# Patient Record
Sex: MALE | Race: WHITE
[De-identification: names, ages, dates, MRNs, and addresses within clinical notes are randomized per-mention and may not be internally consistent; named-entity substitution may affect disease eponyms.]

---

## 2018-01-17 ENCOUNTER — HOSPITAL ENCOUNTER (INPATIENT)
Dept: HOSPITAL 94 - ER | Age: 68
LOS: 5 days | Discharge: HOME | DRG: 189 | End: 2018-01-22
Attending: INTERNAL MEDICINE | Admitting: SPECIALIST
Payer: OTHER GOVERNMENT

## 2018-01-17 VITALS — BODY MASS INDEX: 30.56 KG/M2 | WEIGHT: 238.1 LBS | HEIGHT: 74 IN

## 2018-01-17 VITALS — DIASTOLIC BLOOD PRESSURE: 89 MMHG | SYSTOLIC BLOOD PRESSURE: 133 MMHG

## 2018-01-17 DIAGNOSIS — M19.90: ICD-10-CM

## 2018-01-17 DIAGNOSIS — I08.1: ICD-10-CM

## 2018-01-17 DIAGNOSIS — R91.8: ICD-10-CM

## 2018-01-17 DIAGNOSIS — I42.0: ICD-10-CM

## 2018-01-17 DIAGNOSIS — K21.9: ICD-10-CM

## 2018-01-17 DIAGNOSIS — Z99.81: ICD-10-CM

## 2018-01-17 DIAGNOSIS — Z92.21: ICD-10-CM

## 2018-01-17 DIAGNOSIS — F31.9: ICD-10-CM

## 2018-01-17 DIAGNOSIS — I50.9: ICD-10-CM

## 2018-01-17 DIAGNOSIS — E66.9: ICD-10-CM

## 2018-01-17 DIAGNOSIS — Z79.899: ICD-10-CM

## 2018-01-17 DIAGNOSIS — Z90.49: ICD-10-CM

## 2018-01-17 DIAGNOSIS — I25.10: ICD-10-CM

## 2018-01-17 DIAGNOSIS — I27.20: ICD-10-CM

## 2018-01-17 DIAGNOSIS — I49.3: ICD-10-CM

## 2018-01-17 DIAGNOSIS — C91.10: ICD-10-CM

## 2018-01-17 DIAGNOSIS — F17.210: ICD-10-CM

## 2018-01-17 DIAGNOSIS — Z88.8: ICD-10-CM

## 2018-01-17 DIAGNOSIS — J84.9: ICD-10-CM

## 2018-01-17 DIAGNOSIS — J84.10: ICD-10-CM

## 2018-01-17 DIAGNOSIS — Z77.090: ICD-10-CM

## 2018-01-17 DIAGNOSIS — I47.1: ICD-10-CM

## 2018-01-17 DIAGNOSIS — J44.1: ICD-10-CM

## 2018-01-17 DIAGNOSIS — I11.0: ICD-10-CM

## 2018-01-17 DIAGNOSIS — B33.24: ICD-10-CM

## 2018-01-17 DIAGNOSIS — Z77.098: ICD-10-CM

## 2018-01-17 DIAGNOSIS — J96.21: Primary | ICD-10-CM

## 2018-01-17 LAB
ALBUMIN SERPL BCP-MCNC: 3.7 G/DL (ref 3.4–5)
ALBUMIN/GLOB SERPL: 1.1 {RATIO} (ref 1.1–1.5)
ALP SERPL-CCNC: 83 IU/L (ref 46–116)
ALT SERPL W P-5'-P-CCNC: 31 U/L (ref 12–78)
ANION GAP SERPL CALCULATED.3IONS-SCNC: 4 MMOL/L (ref 8–16)
ANISOCYTOSIS BLD QL SMEAR: (no result)
APTT PPP: 25 SECONDS (ref 22–32)
AST SERPL W P-5'-P-CCNC: 33 U/L (ref 10–37)
BASE EXCESS BLDA CALC-SCNC: 2.4 MMOL/L (ref -2–3)
BASOPHILS # BLD AUTO: 1.4 X10'3 (ref 0–0.2)
BASOPHILS NFR BLD AUTO: 2.5 % (ref 0–1)
BILIRUB SERPL-MCNC: 0.6 MG/DL (ref 0.1–1)
BLASTS NFR BLD MANUAL: 2 % (ref 0–0)
BODY TEMPERATURE: 37
BUN SERPL-MCNC: 15 MG/DL (ref 7–18)
BUN/CREAT SERPL: 11.5 (ref 5.4–32)
CALCIUM SERPL-MCNC: 8.6 MG/DL (ref 8.5–10.1)
CHLORIDE SERPL-SCNC: 101 MMOL/L (ref 99–107)
CO2 SERPL-SCNC: 31.9 MMOL/L (ref 24–32)
COHGB MFR BLDA: 3.9 % (ref 0.5–1.5)
CREAT SERPL-MCNC: 1.3 MG/DL (ref 0.6–1.1)
EOSINOPHIL # BLD AUTO: 0.2 X10'3 (ref 0–0.9)
EOSINOPHIL NFR BLD AUTO: 0.4 % (ref 0–6)
EOSINOPHIL NFR BLD MANUAL: 1 % (ref 0–6)
ERYTHROCYTE [DISTWIDTH] IN BLOOD BY AUTOMATED COUNT: 16.7 % (ref 11.5–14.5)
GFR SERPL CREATININE-BSD FRML MDRD: 55 ML/MIN
GLUCOSE SERPL-MCNC: 109 MG/DL (ref 70–104)
HCO3 BLDA-SCNC: 25.8 MMOL/L (ref 22–26)
HCT VFR BLD AUTO: 39.1 % (ref 42–52)
HGB BLD-MCNC: 13.1 G/DL (ref 14–17.9)
HGB BLDA-MCNC: 13.4 G/DL (ref 14–18)
INR PPP: 1 INR
LYMPHOCYTES # BLD AUTO: 48.1 X10'3 (ref 1.1–4.8)
LYMPHOCYTES NFR BLD AUTO: 85.1 % (ref 21–51)
LYMPHOCYTES NFR BLD MANUAL: 88 % (ref 21–51)
MCH RBC QN AUTO: 34.1 PG (ref 27–31)
MCHC RBC AUTO-ENTMCNC: 33.4 % (ref 33–36.5)
MCV RBC AUTO: 102.1 FL (ref 78–98)
METHGB MFR BLDA: 0.1 % (ref 0.3–1.12)
MONOCYTES # BLD AUTO: 0.4 X10'3 (ref 0–0.9)
MONOCYTES NFR BLD AUTO: 0.7 % (ref 2–12)
NEUTROPHILS # BLD AUTO: 6.4 X10'3 (ref 1.8–7.7)
NEUTROPHILS NFR BLD AUTO: 11.3 % (ref 42–75)
NEUTS SEG NFR BLD MANUAL: 9 % (ref 42–75)
O2/TOTAL GAS SETTING VFR VENT: 21 MMHG/%
OXYHGB MFR BLDA: 86.5 % (ref 94–100)
PCO2 TEMP ADJ BLDA: 36 MMHG (ref 35–48)
PH TEMP ADJ BLDA: 7.47 [PH] (ref 7.35–7.45)
PLATELET # BLD AUTO: 123 X10'3 (ref 140–440)
PLATELET BLD QL SMEAR: (no result)
PMV BLD AUTO: 6.9 FL (ref 7.4–10.4)
PO2 TEMP ADJ BLD: 52.1 MMHG (ref 83–108)
POTASSIUM SERPL-SCNC: 3.9 MMOL/L (ref 3.5–5.1)
PROT SERPL-MCNC: 7.2 G/DL (ref 6.4–8.2)
PROTHROMBIN TIME: 10.1 SECONDS (ref 9–12)
RBC # BLD AUTO: 3.83 X10'6 (ref 4.7–6.1)
RBC MORPH BLD: (no result)
SAO2 % BLDA: 90.1 % (ref 95–98)
SMUDGE CELLS BLD QL SMEAR: (no result)
SODIUM SERPL-SCNC: 137 MMOL/L (ref 135–145)
TOTAL CELLS COUNTED FLD: 100
WBC # BLD AUTO: 56.5 X10'3 (ref 4.5–11)

## 2018-01-17 PROCEDURE — 83735 ASSAY OF MAGNESIUM: CPT

## 2018-01-17 PROCEDURE — 94640 AIRWAY INHALATION TREATMENT: CPT

## 2018-01-17 PROCEDURE — 85018 HEMOGLOBIN: CPT

## 2018-01-17 PROCEDURE — 93005 ELECTROCARDIOGRAM TRACING: CPT

## 2018-01-17 PROCEDURE — 83880 ASSAY OF NATRIURETIC PEPTIDE: CPT

## 2018-01-17 PROCEDURE — 83605 ASSAY OF LACTIC ACID: CPT

## 2018-01-17 PROCEDURE — 96365 THER/PROPH/DIAG IV INF INIT: CPT

## 2018-01-17 PROCEDURE — 36600 WITHDRAWAL OF ARTERIAL BLOOD: CPT

## 2018-01-17 PROCEDURE — 36415 COLL VENOUS BLD VENIPUNCTURE: CPT

## 2018-01-17 PROCEDURE — 87503 INFLUENZA DNA AMP PROB ADDL: CPT

## 2018-01-17 PROCEDURE — 84145 PROCALCITONIN (PCT): CPT

## 2018-01-17 PROCEDURE — 71275 CT ANGIOGRAPHY CHEST: CPT

## 2018-01-17 PROCEDURE — 85730 THROMBOPLASTIN TIME PARTIAL: CPT

## 2018-01-17 PROCEDURE — 96375 TX/PRO/DX INJ NEW DRUG ADDON: CPT

## 2018-01-17 PROCEDURE — 87070 CULTURE OTHR SPECIMN AEROBIC: CPT

## 2018-01-17 PROCEDURE — 87040 BLOOD CULTURE FOR BACTERIA: CPT

## 2018-01-17 PROCEDURE — 71045 X-RAY EXAM CHEST 1 VIEW: CPT

## 2018-01-17 PROCEDURE — 85610 PROTHROMBIN TIME: CPT

## 2018-01-17 PROCEDURE — 85025 COMPLETE CBC W/AUTO DIFF WBC: CPT

## 2018-01-17 PROCEDURE — 82803 BLOOD GASES ANY COMBINATION: CPT

## 2018-01-17 PROCEDURE — 99285 EMERGENCY DEPT VISIT HI MDM: CPT

## 2018-01-17 PROCEDURE — 94760 N-INVAS EAR/PLS OXIMETRY 1: CPT

## 2018-01-17 PROCEDURE — 80053 COMPREHEN METABOLIC PANEL: CPT

## 2018-01-17 PROCEDURE — 87502 INFLUENZA DNA AMP PROBE: CPT

## 2018-01-17 PROCEDURE — 84484 ASSAY OF TROPONIN QUANT: CPT

## 2018-01-17 RX ADMIN — METHYLPREDNISOLONE SODIUM SUCCINATE SCH MG: 40 INJECTION, POWDER, FOR SOLUTION INTRAMUSCULAR; INTRAVENOUS at 23:35

## 2018-01-18 VITALS — DIASTOLIC BLOOD PRESSURE: 91 MMHG | SYSTOLIC BLOOD PRESSURE: 120 MMHG

## 2018-01-18 VITALS — SYSTOLIC BLOOD PRESSURE: 104 MMHG | DIASTOLIC BLOOD PRESSURE: 67 MMHG

## 2018-01-18 VITALS — DIASTOLIC BLOOD PRESSURE: 76 MMHG | SYSTOLIC BLOOD PRESSURE: 125 MMHG

## 2018-01-18 VITALS — DIASTOLIC BLOOD PRESSURE: 71 MMHG | SYSTOLIC BLOOD PRESSURE: 114 MMHG

## 2018-01-18 VITALS — SYSTOLIC BLOOD PRESSURE: 116 MMHG | DIASTOLIC BLOOD PRESSURE: 74 MMHG

## 2018-01-18 VITALS — DIASTOLIC BLOOD PRESSURE: 79 MMHG | SYSTOLIC BLOOD PRESSURE: 114 MMHG

## 2018-01-18 LAB
ALBUMIN SERPL BCP-MCNC: 3.4 G/DL (ref 3.4–5)
ALBUMIN/GLOB SERPL: 1 {RATIO} (ref 1.1–1.5)
ALP SERPL-CCNC: 81 IU/L (ref 46–116)
ALT SERPL W P-5'-P-CCNC: 19 U/L (ref 12–78)
ANION GAP SERPL CALCULATED.3IONS-SCNC: 8 MMOL/L (ref 8–16)
ANISOCYTOSIS BLD QL SMEAR: (no result)
AST SERPL W P-5'-P-CCNC: 16 U/L (ref 10–37)
BASOPHILS # BLD AUTO: (no result) X10'3 (ref 0–0.2)
BASOPHILS NFR BLD AUTO: (no result) % (ref 0–1)
BILIRUB SERPL-MCNC: 0.5 MG/DL (ref 0.1–1)
BUN SERPL-MCNC: 13 MG/DL (ref 7–18)
BUN/CREAT SERPL: 13 (ref 5.4–32)
CALCIUM SERPL-MCNC: 8.4 MG/DL (ref 8.5–10.1)
CHLORIDE SERPL-SCNC: 100 MMOL/L (ref 99–107)
CO2 SERPL-SCNC: 27.9 MMOL/L (ref 24–32)
CREAT SERPL-MCNC: 1 MG/DL (ref 0.6–1.1)
EOSINOPHIL # BLD AUTO: (no result) X10'3 (ref 0–0.9)
EOSINOPHIL NFR BLD AUTO: (no result) % (ref 0–6)
ERYTHROCYTE [DISTWIDTH] IN BLOOD BY AUTOMATED COUNT: 16.7 % (ref 11.5–14.5)
GFR SERPL CREATININE-BSD FRML MDRD: 75 ML/MIN
GLUCOSE SERPL-MCNC: 145 MG/DL (ref 70–104)
HCT VFR BLD AUTO: 36.7 % (ref 42–52)
HGB BLD-MCNC: 12.2 G/DL (ref 14–17.9)
LYMPHOCYTES # BLD AUTO: (no result) X10'3 (ref 1.1–4.8)
LYMPHOCYTES NFR BLD AUTO: (no result) % (ref 21–51)
LYMPHOCYTES NFR BLD MANUAL: 90 % (ref 21–51)
MCH RBC QN AUTO: 34 PG (ref 27–31)
MCHC RBC AUTO-ENTMCNC: 33.3 % (ref 33–36.5)
MCV RBC AUTO: 102.2 FL (ref 78–98)
MONOCYTES # BLD AUTO: (no result) X10'3 (ref 0–0.9)
MONOCYTES NFR BLD AUTO: (no result) % (ref 2–12)
MONOCYTES NFR BLD MANUAL: 1 % (ref 2–12)
NEUTROPHILS # BLD AUTO: (no result) X10'3 (ref 1.8–7.7)
NEUTROPHILS NFR BLD AUTO: (no result) % (ref 42–75)
NEUTS SEG NFR BLD MANUAL: 9 % (ref 42–75)
PLATELET # BLD AUTO: 129 X10'3 (ref 140–440)
PLATELET BLD QL SMEAR: (no result)
PMV BLD AUTO: 7.2 FL (ref 7.4–10.4)
POTASSIUM SERPL-SCNC: 3.7 MMOL/L (ref 3.5–5.1)
PROT SERPL-MCNC: 6.7 G/DL (ref 6.4–8.2)
RBC # BLD AUTO: 3.6 X10'6 (ref 4.7–6.1)
RBC MORPH BLD: (no result)
SMUDGE CELLS BLD QL SMEAR: (no result)
SODIUM SERPL-SCNC: 136 MMOL/L (ref 135–145)
TOTAL CELLS COUNTED FLD: 100
WBC # BLD AUTO: 70.7 X10'3 (ref 4.5–11)

## 2018-01-18 RX ADMIN — Medication SCH EACH: at 08:12

## 2018-01-18 RX ADMIN — IPRATROPIUM BROMIDE AND ALBUTEROL SULFATE PRN ML: .5; 3 SOLUTION RESPIRATORY (INHALATION) at 22:22

## 2018-01-18 RX ADMIN — METHYLPREDNISOLONE SODIUM SUCCINATE SCH MG: 40 INJECTION, POWDER, FOR SOLUTION INTRAMUSCULAR; INTRAVENOUS at 08:00

## 2018-01-18 RX ADMIN — ENOXAPARIN SODIUM SCH MG: 100 INJECTION SUBCUTANEOUS at 08:14

## 2018-01-18 RX ADMIN — IPRATROPIUM BROMIDE AND ALBUTEROL SULFATE PRN ML: .5; 3 SOLUTION RESPIRATORY (INHALATION) at 07:43

## 2018-01-18 RX ADMIN — Medication SCH MG: at 08:13

## 2018-01-18 RX ADMIN — IPRATROPIUM BROMIDE AND ALBUTEROL SULFATE PRN ML: .5; 3 SOLUTION RESPIRATORY (INHALATION) at 16:56

## 2018-01-18 RX ADMIN — HYDROCODONE BITARTRATE AND ACETAMINOPHEN PRN TAB: 5; 325 TABLET ORAL at 17:44

## 2018-01-18 RX ADMIN — HYDROCODONE BITARTRATE AND ACETAMINOPHEN PRN TAB: 5; 325 TABLET ORAL at 09:52

## 2018-01-19 VITALS — SYSTOLIC BLOOD PRESSURE: 113 MMHG | DIASTOLIC BLOOD PRESSURE: 68 MMHG

## 2018-01-19 VITALS — SYSTOLIC BLOOD PRESSURE: 106 MMHG | DIASTOLIC BLOOD PRESSURE: 71 MMHG

## 2018-01-19 VITALS — DIASTOLIC BLOOD PRESSURE: 70 MMHG | SYSTOLIC BLOOD PRESSURE: 106 MMHG

## 2018-01-19 VITALS — SYSTOLIC BLOOD PRESSURE: 108 MMHG | DIASTOLIC BLOOD PRESSURE: 69 MMHG

## 2018-01-19 VITALS — SYSTOLIC BLOOD PRESSURE: 97 MMHG | DIASTOLIC BLOOD PRESSURE: 69 MMHG

## 2018-01-19 VITALS — SYSTOLIC BLOOD PRESSURE: 108 MMHG | DIASTOLIC BLOOD PRESSURE: 67 MMHG

## 2018-01-19 VITALS — SYSTOLIC BLOOD PRESSURE: 111 MMHG | DIASTOLIC BLOOD PRESSURE: 75 MMHG

## 2018-01-19 LAB
ALBUMIN SERPL BCP-MCNC: 3.4 G/DL (ref 3.4–5)
ALBUMIN/GLOB SERPL: 1 {RATIO} (ref 1.1–1.5)
ALP SERPL-CCNC: 73 IU/L (ref 46–116)
ALT SERPL W P-5'-P-CCNC: 26 U/L (ref 12–78)
ANION GAP SERPL CALCULATED.3IONS-SCNC: 5 MMOL/L (ref 8–16)
ANISOCYTOSIS BLD QL SMEAR: (no result)
AST SERPL W P-5'-P-CCNC: 26 U/L (ref 10–37)
BASOPHILS # BLD AUTO: (no result) X10'3 (ref 0–0.2)
BASOPHILS NFR BLD AUTO: (no result) % (ref 0–1)
BILIRUB SERPL-MCNC: 0.4 MG/DL (ref 0.1–1)
BLASTS NFR BLD MANUAL: 35 % (ref 0–0)
BUN SERPL-MCNC: 22 MG/DL (ref 7–18)
BUN/CREAT SERPL: 16.9 (ref 5.4–32)
CALCIUM SERPL-MCNC: 8.2 MG/DL (ref 8.5–10.1)
CHLORIDE SERPL-SCNC: 100 MMOL/L (ref 99–107)
CO2 SERPL-SCNC: 31.1 MMOL/L (ref 24–32)
CREAT SERPL-MCNC: 1.3 MG/DL (ref 0.6–1.1)
EOSINOPHIL # BLD AUTO: (no result) X10'3 (ref 0–0.9)
EOSINOPHIL NFR BLD AUTO: (no result) % (ref 0–6)
ERYTHROCYTE [DISTWIDTH] IN BLOOD BY AUTOMATED COUNT: 15.5 % (ref 11.5–14.5)
GFR SERPL CREATININE-BSD FRML MDRD: 55 ML/MIN
GLUCOSE SERPL-MCNC: 155 MG/DL (ref 70–104)
HCT VFR BLD AUTO: 34.9 % (ref 42–52)
HGB BLD-MCNC: 12.2 G/DL (ref 14–17.9)
LYMPHOCYTES # BLD AUTO: (no result) X10'3 (ref 1.1–4.8)
LYMPHOCYTES NFR BLD AUTO: (no result) % (ref 21–51)
LYMPHOCYTES NFR BLD MANUAL: 60 % (ref 21–51)
MCH RBC QN AUTO: 35.5 PG (ref 27–31)
MCHC RBC AUTO-ENTMCNC: 34.8 % (ref 33–36.5)
MCV RBC AUTO: 101.9 FL (ref 78–98)
MONOCYTES # BLD AUTO: (no result) X10'3 (ref 0–0.9)
MONOCYTES NFR BLD AUTO: (no result) % (ref 2–12)
MONOCYTES NFR BLD MANUAL: 1 % (ref 2–12)
NEUTROPHILS # BLD AUTO: (no result) X10'3 (ref 1.8–7.7)
NEUTROPHILS NFR BLD AUTO: (no result) % (ref 42–75)
NEUTS SEG NFR BLD MANUAL: 4 % (ref 42–75)
PLATELET # BLD AUTO: 124 X10'3 (ref 140–440)
PLATELET BLD QL SMEAR: (no result)
PMV BLD AUTO: 8 FL (ref 7.4–10.4)
POTASSIUM SERPL-SCNC: 4.5 MMOL/L (ref 3.5–5.1)
PROT SERPL-MCNC: 6.7 G/DL (ref 6.4–8.2)
RBC # BLD AUTO: 3.43 X10'6 (ref 4.7–6.1)
RBC MORPH BLD: (no result)
SMUDGE CELLS BLD QL SMEAR: (no result)
SODIUM SERPL-SCNC: 136 MMOL/L (ref 135–145)
TOTAL CELLS COUNTED FLD: 100
WBC # BLD AUTO: 74.2 X10'3 (ref 4.5–11)

## 2018-01-19 RX ADMIN — IPRATROPIUM BROMIDE AND ALBUTEROL SULFATE PRN ML: .5; 3 SOLUTION RESPIRATORY (INHALATION) at 21:06

## 2018-01-19 RX ADMIN — GUAIFENESIN SCH MG: 200 SOLUTION ORAL at 12:07

## 2018-01-19 RX ADMIN — Medication SCH EACH: at 08:32

## 2018-01-19 RX ADMIN — Medication SCH MG: at 08:31

## 2018-01-19 RX ADMIN — HYDROCODONE BITARTRATE AND ACETAMINOPHEN PRN TAB: 5; 325 TABLET ORAL at 08:31

## 2018-01-19 RX ADMIN — IPRATROPIUM BROMIDE AND ALBUTEROL SULFATE PRN ML: .5; 3 SOLUTION RESPIRATORY (INHALATION) at 02:48

## 2018-01-19 RX ADMIN — ENOXAPARIN SODIUM SCH MG: 100 INJECTION SUBCUTANEOUS at 08:40

## 2018-01-19 RX ADMIN — IPRATROPIUM BROMIDE AND ALBUTEROL SULFATE PRN ML: .5; 3 SOLUTION RESPIRATORY (INHALATION) at 07:26

## 2018-01-19 RX ADMIN — HYDROCODONE BITARTRATE AND ACETAMINOPHEN PRN TAB: 5; 325 TABLET ORAL at 18:43

## 2018-01-19 RX ADMIN — GUAIFENESIN SCH MG: 200 SOLUTION ORAL at 20:34

## 2018-01-20 VITALS — DIASTOLIC BLOOD PRESSURE: 60 MMHG | SYSTOLIC BLOOD PRESSURE: 91 MMHG

## 2018-01-20 VITALS — SYSTOLIC BLOOD PRESSURE: 99 MMHG | DIASTOLIC BLOOD PRESSURE: 80 MMHG

## 2018-01-20 VITALS — SYSTOLIC BLOOD PRESSURE: 100 MMHG | DIASTOLIC BLOOD PRESSURE: 59 MMHG

## 2018-01-20 VITALS — SYSTOLIC BLOOD PRESSURE: 102 MMHG | DIASTOLIC BLOOD PRESSURE: 67 MMHG

## 2018-01-20 LAB
ALBUMIN SERPL BCP-MCNC: 3.4 G/DL (ref 3.4–5)
ALBUMIN/GLOB SERPL: 1.1 {RATIO} (ref 1.1–1.5)
ALP SERPL-CCNC: 66 IU/L (ref 46–116)
ALT SERPL W P-5'-P-CCNC: 23 U/L (ref 12–78)
ANION GAP SERPL CALCULATED.3IONS-SCNC: 7 MMOL/L (ref 8–16)
ANISOCYTOSIS BLD QL SMEAR: (no result)
AST SERPL W P-5'-P-CCNC: 15 U/L (ref 10–37)
BASOPHILS # BLD AUTO: (no result) X10'3 (ref 0–0.2)
BASOPHILS NFR BLD AUTO: (no result) % (ref 0–1)
BILIRUB SERPL-MCNC: 0.4 MG/DL (ref 0.1–1)
BUN SERPL-MCNC: 26 MG/DL (ref 7–18)
BUN/CREAT SERPL: 20 (ref 5.4–32)
CALCIUM SERPL-MCNC: 8.4 MG/DL (ref 8.5–10.1)
CHLORIDE SERPL-SCNC: 99 MMOL/L (ref 99–107)
CO2 SERPL-SCNC: 31.7 MMOL/L (ref 24–32)
CREAT SERPL-MCNC: 1.3 MG/DL (ref 0.6–1.1)
EOSINOPHIL # BLD AUTO: (no result) X10'3 (ref 0–0.9)
EOSINOPHIL NFR BLD AUTO: (no result) % (ref 0–6)
ERYTHROCYTE [DISTWIDTH] IN BLOOD BY AUTOMATED COUNT: 17 % (ref 11.5–14.5)
GFR SERPL CREATININE-BSD FRML MDRD: 55 ML/MIN
GLUCOSE SERPL-MCNC: 109 MG/DL (ref 70–104)
HCT VFR BLD AUTO: 36.3 % (ref 42–52)
HGB BLD-MCNC: 11.7 G/DL (ref 14–17.9)
LYMPHOCYTES # BLD AUTO: (no result) X10'3 (ref 1.1–4.8)
LYMPHOCYTES NFR BLD AUTO: (no result) % (ref 21–51)
LYMPHOCYTES NFR BLD MANUAL: 88 % (ref 21–51)
MACROCYTES BLD QL SMEAR: (no result)
MAGNESIUM SERPL-MCNC: 1.4 MG/DL (ref 1.5–2.4)
MCH RBC QN AUTO: 33.7 PG (ref 27–31)
MCHC RBC AUTO-ENTMCNC: 32.3 % (ref 33–36.5)
MCV RBC AUTO: 104.5 FL (ref 78–98)
MONOCYTES # BLD AUTO: (no result) X10'3 (ref 0–0.9)
MONOCYTES NFR BLD AUTO: (no result) % (ref 2–12)
MONOCYTES NFR BLD MANUAL: 1 % (ref 2–12)
NEUTROPHILS # BLD AUTO: (no result) X10'3 (ref 1.8–7.7)
NEUTROPHILS NFR BLD AUTO: (no result) % (ref 42–75)
NEUTS SEG NFR BLD MANUAL: 11 % (ref 42–75)
PLATELET # BLD AUTO: 130 X10'3 (ref 140–440)
PLATELET BLD QL SMEAR: (no result)
PMV BLD AUTO: 7.3 FL (ref 7.4–10.4)
POTASSIUM SERPL-SCNC: 3.7 MMOL/L (ref 3.5–5.1)
PROT SERPL-MCNC: 6.5 G/DL (ref 6.4–8.2)
RBC # BLD AUTO: 3.48 X10'6 (ref 4.7–6.1)
RBC MORPH BLD: (no result)
SMUDGE CELLS BLD QL SMEAR: (no result)
SODIUM SERPL-SCNC: 138 MMOL/L (ref 135–145)
TOTAL CELLS COUNTED FLD: 100
WBC # BLD AUTO: 72.5 X10'3 (ref 4.5–11)

## 2018-01-20 RX ADMIN — IPRATROPIUM BROMIDE AND ALBUTEROL SULFATE PRN ML: .5; 3 SOLUTION RESPIRATORY (INHALATION) at 07:31

## 2018-01-20 RX ADMIN — GUAIFENESIN SCH MG: 200 SOLUTION ORAL at 13:45

## 2018-01-20 RX ADMIN — HYDROCODONE BITARTRATE AND ACETAMINOPHEN PRN TAB: 5; 325 TABLET ORAL at 21:10

## 2018-01-20 RX ADMIN — GUAIFENESIN SCH MG: 200 SOLUTION ORAL at 08:31

## 2018-01-20 RX ADMIN — HYDROCODONE BITARTRATE AND ACETAMINOPHEN PRN TAB: 5; 325 TABLET ORAL at 15:01

## 2018-01-20 RX ADMIN — MAGNESIUM HYDROXIDE PRN ML: 400 SUSPENSION ORAL at 11:45

## 2018-01-20 RX ADMIN — ENOXAPARIN SODIUM SCH MG: 100 INJECTION SUBCUTANEOUS at 08:33

## 2018-01-20 RX ADMIN — HYDROCODONE BITARTRATE AND ACETAMINOPHEN PRN TAB: 5; 325 TABLET ORAL at 08:33

## 2018-01-20 RX ADMIN — GUAIFENESIN SCH MG: 200 SOLUTION ORAL at 21:03

## 2018-01-20 RX ADMIN — Medication SCH EACH: at 08:32

## 2018-01-20 RX ADMIN — IPRATROPIUM BROMIDE AND ALBUTEROL SULFATE PRN ML: .5; 3 SOLUTION RESPIRATORY (INHALATION) at 17:41

## 2018-01-20 RX ADMIN — Medication SCH MG: at 08:32

## 2018-01-21 VITALS — DIASTOLIC BLOOD PRESSURE: 59 MMHG | SYSTOLIC BLOOD PRESSURE: 101 MMHG

## 2018-01-21 VITALS — SYSTOLIC BLOOD PRESSURE: 106 MMHG | DIASTOLIC BLOOD PRESSURE: 68 MMHG

## 2018-01-21 VITALS — SYSTOLIC BLOOD PRESSURE: 112 MMHG | DIASTOLIC BLOOD PRESSURE: 69 MMHG

## 2018-01-21 VITALS — DIASTOLIC BLOOD PRESSURE: 85 MMHG | SYSTOLIC BLOOD PRESSURE: 128 MMHG

## 2018-01-21 VITALS — DIASTOLIC BLOOD PRESSURE: 67 MMHG | SYSTOLIC BLOOD PRESSURE: 108 MMHG

## 2018-01-21 LAB
ALBUMIN SERPL BCP-MCNC: 3.4 G/DL (ref 3.4–5)
ALBUMIN/GLOB SERPL: 1.2 {RATIO} (ref 1.1–1.5)
ALP SERPL-CCNC: 67 IU/L (ref 46–116)
ALT SERPL W P-5'-P-CCNC: 37 U/L (ref 12–78)
ANION GAP SERPL CALCULATED.3IONS-SCNC: 5 MMOL/L (ref 8–16)
ANISOCYTOSIS BLD QL SMEAR: (no result)
AST SERPL W P-5'-P-CCNC: 27 U/L (ref 10–37)
BASOPHILS # BLD AUTO: (no result) X10'3 (ref 0–0.2)
BASOPHILS NFR BLD AUTO: (no result) % (ref 0–1)
BILIRUB SERPL-MCNC: 0.3 MG/DL (ref 0.1–1)
BUN SERPL-MCNC: 20 MG/DL (ref 7–18)
BUN/CREAT SERPL: 18.2 (ref 5.4–32)
CALCIUM SERPL-MCNC: 8.3 MG/DL (ref 8.5–10.1)
CHLORIDE SERPL-SCNC: 101 MMOL/L (ref 99–107)
CO2 SERPL-SCNC: 31.1 MMOL/L (ref 24–32)
CREAT SERPL-MCNC: 1.1 MG/DL (ref 0.6–1.1)
EOSINOPHIL # BLD AUTO: (no result) X10'3 (ref 0–0.9)
EOSINOPHIL NFR BLD AUTO: (no result) % (ref 0–6)
ERYTHROCYTE [DISTWIDTH] IN BLOOD BY AUTOMATED COUNT: 16.8 % (ref 11.5–14.5)
GFR SERPL CREATININE-BSD FRML MDRD: 67 ML/MIN
GLUCOSE SERPL-MCNC: 91 MG/DL (ref 70–104)
HCT VFR BLD AUTO: 37 % (ref 42–52)
HGB BLD-MCNC: 11.9 G/DL (ref 14–17.9)
LYMPHOCYTES # BLD AUTO: (no result) X10'3 (ref 1.1–4.8)
LYMPHOCYTES NFR BLD AUTO: (no result) % (ref 21–51)
LYMPHOCYTES NFR BLD MANUAL: 93 % (ref 21–51)
MACROCYTES BLD QL SMEAR: (no result)
MCH RBC QN AUTO: 33.7 PG (ref 27–31)
MCHC RBC AUTO-ENTMCNC: 32.2 % (ref 33–36.5)
MCV RBC AUTO: 104.8 FL (ref 78–98)
MONOCYTES # BLD AUTO: (no result) X10'3 (ref 0–0.9)
MONOCYTES NFR BLD AUTO: (no result) % (ref 2–12)
MONOCYTES NFR BLD MANUAL: 1 % (ref 2–12)
NEUTROPHILS # BLD AUTO: (no result) X10'3 (ref 1.8–7.7)
NEUTROPHILS NFR BLD AUTO: (no result) % (ref 42–75)
NEUTS SEG NFR BLD MANUAL: 6 % (ref 42–75)
PLATELET # BLD AUTO: 124 X10'3 (ref 140–440)
PLATELET BLD QL SMEAR: (no result)
PMV BLD AUTO: 7.3 FL (ref 7.4–10.4)
POTASSIUM SERPL-SCNC: 3.9 MMOL/L (ref 3.5–5.1)
PROT SERPL-MCNC: 6.3 G/DL (ref 6.4–8.2)
RBC # BLD AUTO: 3.53 X10'6 (ref 4.7–6.1)
RBC MORPH BLD: (no result)
SMUDGE CELLS BLD QL SMEAR: (no result)
SODIUM SERPL-SCNC: 137 MMOL/L (ref 135–145)
STOMATOCYTES BLD QL SMEAR: (no result)
TOTAL CELLS COUNTED FLD: 100
WBC # BLD AUTO: 66.8 X10'3 (ref 4.5–11)

## 2018-01-21 RX ADMIN — HYDROCODONE BITARTRATE AND ACETAMINOPHEN PRN TAB: 5; 325 TABLET ORAL at 08:03

## 2018-01-21 RX ADMIN — Medication SCH EACH: at 08:02

## 2018-01-21 RX ADMIN — Medication SCH MG: at 08:02

## 2018-01-21 RX ADMIN — IPRATROPIUM BROMIDE AND ALBUTEROL SULFATE PRN ML: .5; 3 SOLUTION RESPIRATORY (INHALATION) at 16:51

## 2018-01-21 RX ADMIN — GUAIFENESIN SCH MG: 200 SOLUTION ORAL at 12:12

## 2018-01-21 RX ADMIN — GUAIFENESIN SCH MG: 200 SOLUTION ORAL at 20:25

## 2018-01-21 RX ADMIN — CARVEDILOL SCH MG: 6.25 TABLET, FILM COATED ORAL at 20:25

## 2018-01-21 RX ADMIN — ENOXAPARIN SODIUM SCH MG: 100 INJECTION SUBCUTANEOUS at 08:02

## 2018-01-21 RX ADMIN — HYDROCODONE BITARTRATE AND ACETAMINOPHEN PRN TAB: 5; 325 TABLET ORAL at 14:28

## 2018-01-21 RX ADMIN — MAGNESIUM HYDROXIDE PRN ML: 400 SUSPENSION ORAL at 08:01

## 2018-01-21 RX ADMIN — GUAIFENESIN SCH MG: 200 SOLUTION ORAL at 08:01

## 2018-01-22 VITALS — SYSTOLIC BLOOD PRESSURE: 101 MMHG | DIASTOLIC BLOOD PRESSURE: 69 MMHG

## 2018-01-22 LAB
ALBUMIN SERPL BCP-MCNC: 3.4 G/DL (ref 3.4–5)
ALBUMIN/GLOB SERPL: 1.2 {RATIO} (ref 1.1–1.5)
ALP SERPL-CCNC: 65 IU/L (ref 46–116)
ALT SERPL W P-5'-P-CCNC: 47 U/L (ref 12–78)
ANION GAP SERPL CALCULATED.3IONS-SCNC: 5 MMOL/L (ref 8–16)
ANISOCYTOSIS BLD QL SMEAR: (no result)
AST SERPL W P-5'-P-CCNC: 23 U/L (ref 10–37)
BASOPHILS # BLD AUTO: (no result) X10'3 (ref 0–0.2)
BASOPHILS NFR BLD AUTO: (no result) % (ref 0–1)
BILIRUB SERPL-MCNC: 0.4 MG/DL (ref 0.1–1)
BUN SERPL-MCNC: 17 MG/DL (ref 7–18)
BUN/CREAT SERPL: 17 (ref 5.4–32)
CALCIUM SERPL-MCNC: 8.2 MG/DL (ref 8.5–10.1)
CHLORIDE SERPL-SCNC: 100 MMOL/L (ref 99–107)
CO2 SERPL-SCNC: 31.3 MMOL/L (ref 24–32)
CREAT SERPL-MCNC: 1 MG/DL (ref 0.6–1.1)
EOSINOPHIL # BLD AUTO: (no result) X10'3 (ref 0–0.9)
EOSINOPHIL NFR BLD AUTO: (no result) % (ref 0–6)
EOSINOPHIL NFR BLD MANUAL: 1 % (ref 0–6)
ERYTHROCYTE [DISTWIDTH] IN BLOOD BY AUTOMATED COUNT: 16.9 % (ref 11.5–14.5)
GFR SERPL CREATININE-BSD FRML MDRD: 75 ML/MIN
GLUCOSE SERPL-MCNC: 93 MG/DL (ref 70–104)
HCT VFR BLD AUTO: 37 % (ref 42–52)
HGB BLD-MCNC: 12.1 G/DL (ref 14–17.9)
LYMPHOCYTES # BLD AUTO: (no result) X10'3 (ref 1.1–4.8)
LYMPHOCYTES NFR BLD AUTO: (no result) % (ref 21–51)
LYMPHOCYTES NFR BLD MANUAL: 92 % (ref 21–51)
MACROCYTES BLD QL SMEAR: (no result)
MCH RBC QN AUTO: 34 PG (ref 27–31)
MCHC RBC AUTO-ENTMCNC: 32.8 % (ref 33–36.5)
MCV RBC AUTO: 103.9 FL (ref 78–98)
MONOCYTES # BLD AUTO: (no result) X10'3 (ref 0–0.9)
MONOCYTES NFR BLD AUTO: (no result) % (ref 2–12)
MONOCYTES NFR BLD MANUAL: 1 % (ref 2–12)
NEUTROPHILS # BLD AUTO: (no result) X10'3 (ref 1.8–7.7)
NEUTROPHILS NFR BLD AUTO: (no result) % (ref 42–75)
NEUTS SEG NFR BLD MANUAL: 6 % (ref 42–75)
PLATELET # BLD AUTO: 141 X10'3 (ref 140–440)
PLATELET BLD QL SMEAR: (no result)
PMV BLD AUTO: 7.4 FL (ref 7.4–10.4)
POTASSIUM SERPL-SCNC: 4.1 MMOL/L (ref 3.5–5.1)
PROT SERPL-MCNC: 6.2 G/DL (ref 6.4–8.2)
RBC # BLD AUTO: 3.56 X10'6 (ref 4.7–6.1)
RBC MORPH BLD: (no result)
SMUDGE CELLS BLD QL SMEAR: (no result)
SODIUM SERPL-SCNC: 136 MMOL/L (ref 135–145)
STOMATOCYTES BLD QL SMEAR: (no result)
TOTAL CELLS COUNTED FLD: 100
WBC # BLD AUTO: 71.6 X10'3 (ref 4.5–11)

## 2018-01-22 RX ADMIN — GUAIFENESIN SCH MG: 200 SOLUTION ORAL at 13:00

## 2018-01-22 RX ADMIN — CARVEDILOL SCH MG: 6.25 TABLET, FILM COATED ORAL at 07:42

## 2018-01-22 RX ADMIN — Medication SCH EACH: at 07:41

## 2018-01-22 RX ADMIN — IPRATROPIUM BROMIDE AND ALBUTEROL SULFATE PRN ML: .5; 3 SOLUTION RESPIRATORY (INHALATION) at 00:53

## 2018-01-22 RX ADMIN — ENOXAPARIN SODIUM SCH MG: 100 INJECTION SUBCUTANEOUS at 07:41

## 2018-01-22 RX ADMIN — Medication SCH MG: at 07:41

## 2018-01-22 RX ADMIN — GUAIFENESIN SCH MG: 200 SOLUTION ORAL at 07:42

## 2018-02-10 ENCOUNTER — HOSPITAL ENCOUNTER (INPATIENT)
Dept: HOSPITAL 94 - ER | Age: 68
LOS: 6 days | Discharge: HOME | DRG: 190 | End: 2018-02-16
Attending: HOSPITALIST | Admitting: INTERNAL MEDICINE
Payer: OTHER GOVERNMENT

## 2018-02-10 VITALS — HEIGHT: 74 IN | WEIGHT: 244.71 LBS | BODY MASS INDEX: 31.41 KG/M2

## 2018-02-10 DIAGNOSIS — F43.10: ICD-10-CM

## 2018-02-10 DIAGNOSIS — Z87.891: ICD-10-CM

## 2018-02-10 DIAGNOSIS — Z82.49: ICD-10-CM

## 2018-02-10 DIAGNOSIS — K21.9: ICD-10-CM

## 2018-02-10 DIAGNOSIS — Z88.8: ICD-10-CM

## 2018-02-10 DIAGNOSIS — C91.10: ICD-10-CM

## 2018-02-10 DIAGNOSIS — Z79.899: ICD-10-CM

## 2018-02-10 DIAGNOSIS — Z90.49: ICD-10-CM

## 2018-02-10 DIAGNOSIS — G62.9: ICD-10-CM

## 2018-02-10 DIAGNOSIS — F41.9: ICD-10-CM

## 2018-02-10 DIAGNOSIS — I25.10: ICD-10-CM

## 2018-02-10 DIAGNOSIS — Z99.81: ICD-10-CM

## 2018-02-10 DIAGNOSIS — F31.9: ICD-10-CM

## 2018-02-10 DIAGNOSIS — J44.1: Primary | ICD-10-CM

## 2018-02-10 DIAGNOSIS — I27.20: ICD-10-CM

## 2018-02-10 DIAGNOSIS — I73.9: ICD-10-CM

## 2018-02-10 DIAGNOSIS — I11.0: ICD-10-CM

## 2018-02-10 DIAGNOSIS — I42.0: ICD-10-CM

## 2018-02-10 DIAGNOSIS — J96.22: ICD-10-CM

## 2018-02-10 DIAGNOSIS — J84.10: ICD-10-CM

## 2018-02-10 DIAGNOSIS — E83.42: ICD-10-CM

## 2018-02-10 DIAGNOSIS — I25.5: ICD-10-CM

## 2018-02-10 DIAGNOSIS — Z79.82: ICD-10-CM

## 2018-02-10 DIAGNOSIS — I50.22: ICD-10-CM

## 2018-02-10 LAB
ALBUMIN SERPL BCP-MCNC: 3.7 G/DL (ref 3.4–5)
ALBUMIN/GLOB SERPL: 1.1 {RATIO} (ref 1.1–1.5)
ALP SERPL-CCNC: 105 IU/L (ref 46–116)
ALT SERPL W P-5'-P-CCNC: 20 U/L (ref 12–78)
ANION GAP SERPL CALCULATED.3IONS-SCNC: 10 MMOL/L (ref 8–16)
ANISOCYTOSIS BLD QL SMEAR: (no result)
APTT PPP: 25 SECONDS (ref 22–32)
AST SERPL W P-5'-P-CCNC: 15 U/L (ref 10–37)
BASOPHILS # BLD AUTO: (no result) X10'3 (ref 0–0.2)
BASOPHILS NFR BLD AUTO: (no result) % (ref 0–1)
BILIRUB SERPL-MCNC: 0.4 MG/DL (ref 0.1–1)
BUN SERPL-MCNC: 20 MG/DL (ref 7–18)
BUN/CREAT SERPL: 16.4 (ref 5.4–32)
CALCIUM SERPL-MCNC: 8.7 MG/DL (ref 8.5–10.1)
CHLORIDE SERPL-SCNC: 100 MMOL/L (ref 99–107)
CO2 SERPL-SCNC: 31.4 MMOL/L (ref 24–32)
CREAT SERPL-MCNC: 1.22 MG/DL (ref 0.6–1.1)
EOSINOPHIL # BLD AUTO: (no result) X10'3 (ref 0–0.9)
EOSINOPHIL NFR BLD AUTO: (no result) % (ref 0–6)
ERYTHROCYTE [DISTWIDTH] IN BLOOD BY AUTOMATED COUNT: 16.2 % (ref 11.5–14.5)
GFR SERPL CREATININE-BSD FRML MDRD: 59 ML/MIN
GLUCOSE SERPL-MCNC: 97 MG/DL (ref 70–104)
HCT VFR BLD AUTO: 38.3 % (ref 42–52)
HGB BLD-MCNC: 12.6 G/DL (ref 14–17.9)
INR PPP: 0.9 INR
LYMPHOCYTES # BLD AUTO: (no result) X10'3 (ref 1.1–4.8)
LYMPHOCYTES NFR BLD AUTO: (no result) % (ref 21–51)
LYMPHOCYTES NFR BLD MANUAL: 93 % (ref 21–51)
MACROCYTES BLD QL SMEAR: (no result)
MCH RBC QN AUTO: 33.9 PG (ref 27–31)
MCHC RBC AUTO-ENTMCNC: 32.9 % (ref 33–36.5)
MCV RBC AUTO: 102.8 FL (ref 78–98)
MONOCYTES # BLD AUTO: (no result) X10'3 (ref 0–0.9)
MONOCYTES NFR BLD AUTO: (no result) % (ref 2–12)
MONOCYTES NFR BLD MANUAL: 1 % (ref 2–12)
NEUTROPHILS # BLD AUTO: (no result) X10'3 (ref 1.8–7.7)
NEUTROPHILS NFR BLD AUTO: (no result) % (ref 42–75)
NEUTS SEG NFR BLD MANUAL: 6 % (ref 42–75)
PLATELET # BLD AUTO: 140 X10'3 (ref 140–440)
PLATELET BLD QL SMEAR: NORMAL
PMV BLD AUTO: 6.7 FL (ref 7.4–10.4)
POTASSIUM SERPL-SCNC: 3.8 MMOL/L (ref 3.5–5.1)
PROT SERPL-MCNC: 7 G/DL (ref 6.4–8.2)
PROTHROMBIN TIME: 9.7 SECONDS (ref 9–12)
RBC # BLD AUTO: 3.72 X10'6 (ref 4.7–6.1)
RBC MORPH BLD: (no result)
SMUDGE CELLS BLD QL SMEAR: (no result)
SODIUM SERPL-SCNC: 141 MMOL/L (ref 135–145)
TOTAL CELLS COUNTED FLD: 100
WBC # BLD AUTO: 83.6 X10'3 (ref 4.5–11)

## 2018-02-10 PROCEDURE — 87502 INFLUENZA DNA AMP PROBE: CPT

## 2018-02-10 PROCEDURE — 85730 THROMBOPLASTIN TIME PARTIAL: CPT

## 2018-02-10 PROCEDURE — 87070 CULTURE OTHR SPECIMN AEROBIC: CPT

## 2018-02-10 PROCEDURE — 84484 ASSAY OF TROPONIN QUANT: CPT

## 2018-02-10 PROCEDURE — 94640 AIRWAY INHALATION TREATMENT: CPT

## 2018-02-10 PROCEDURE — 99285 EMERGENCY DEPT VISIT HI MDM: CPT

## 2018-02-10 PROCEDURE — 71045 X-RAY EXAM CHEST 1 VIEW: CPT

## 2018-02-10 PROCEDURE — 36415 COLL VENOUS BLD VENIPUNCTURE: CPT

## 2018-02-10 PROCEDURE — 80053 COMPREHEN METABOLIC PANEL: CPT

## 2018-02-10 PROCEDURE — 83735 ASSAY OF MAGNESIUM: CPT

## 2018-02-10 PROCEDURE — 93005 ELECTROCARDIOGRAM TRACING: CPT

## 2018-02-10 PROCEDURE — 94660 CPAP INITIATION&MGMT: CPT

## 2018-02-10 PROCEDURE — 94667 MNPJ CHEST WALL 1ST: CPT

## 2018-02-10 PROCEDURE — 94668 MNPJ CHEST WALL SBSQ: CPT

## 2018-02-10 PROCEDURE — 94760 N-INVAS EAR/PLS OXIMETRY 1: CPT

## 2018-02-10 PROCEDURE — 85018 HEMOGLOBIN: CPT

## 2018-02-10 PROCEDURE — 85025 COMPLETE CBC W/AUTO DIFF WBC: CPT

## 2018-02-10 PROCEDURE — 87503 INFLUENZA DNA AMP PROB ADDL: CPT

## 2018-02-10 PROCEDURE — 80048 BASIC METABOLIC PNL TOTAL CA: CPT

## 2018-02-10 PROCEDURE — 83880 ASSAY OF NATRIURETIC PEPTIDE: CPT

## 2018-02-10 PROCEDURE — 85610 PROTHROMBIN TIME: CPT

## 2018-02-10 PROCEDURE — 96374 THER/PROPH/DIAG INJ IV PUSH: CPT

## 2018-02-10 PROCEDURE — 93306 TTE W/DOPPLER COMPLETE: CPT

## 2018-02-10 PROCEDURE — 82803 BLOOD GASES ANY COMBINATION: CPT

## 2018-02-10 PROCEDURE — 36600 WITHDRAWAL OF ARTERIAL BLOOD: CPT

## 2018-02-11 VITALS — SYSTOLIC BLOOD PRESSURE: 140 MMHG | DIASTOLIC BLOOD PRESSURE: 80 MMHG

## 2018-02-11 VITALS — SYSTOLIC BLOOD PRESSURE: 127 MMHG | DIASTOLIC BLOOD PRESSURE: 71 MMHG

## 2018-02-11 VITALS — SYSTOLIC BLOOD PRESSURE: 116 MMHG | DIASTOLIC BLOOD PRESSURE: 71 MMHG

## 2018-02-11 VITALS — SYSTOLIC BLOOD PRESSURE: 111 MMHG | DIASTOLIC BLOOD PRESSURE: 72 MMHG

## 2018-02-11 VITALS — SYSTOLIC BLOOD PRESSURE: 104 MMHG | DIASTOLIC BLOOD PRESSURE: 71 MMHG

## 2018-02-11 VITALS — SYSTOLIC BLOOD PRESSURE: 113 MMHG | DIASTOLIC BLOOD PRESSURE: 64 MMHG

## 2018-02-11 VITALS — DIASTOLIC BLOOD PRESSURE: 80 MMHG | SYSTOLIC BLOOD PRESSURE: 134 MMHG

## 2018-02-11 VITALS — DIASTOLIC BLOOD PRESSURE: 65 MMHG | SYSTOLIC BLOOD PRESSURE: 123 MMHG

## 2018-02-11 VITALS — SYSTOLIC BLOOD PRESSURE: 107 MMHG | DIASTOLIC BLOOD PRESSURE: 73 MMHG

## 2018-02-11 VITALS — DIASTOLIC BLOOD PRESSURE: 64 MMHG | SYSTOLIC BLOOD PRESSURE: 119 MMHG

## 2018-02-11 VITALS — SYSTOLIC BLOOD PRESSURE: 129 MMHG | DIASTOLIC BLOOD PRESSURE: 97 MMHG

## 2018-02-11 VITALS — DIASTOLIC BLOOD PRESSURE: 75 MMHG | SYSTOLIC BLOOD PRESSURE: 129 MMHG

## 2018-02-11 LAB
ALBUMIN SERPL BCP-MCNC: 3.3 G/DL (ref 3.4–5)
ALBUMIN/GLOB SERPL: 1 {RATIO} (ref 1.1–1.5)
ALP SERPL-CCNC: 96 IU/L (ref 46–116)
ALT SERPL W P-5'-P-CCNC: 13 U/L (ref 12–78)
ANION GAP SERPL CALCULATED.3IONS-SCNC: 6 MMOL/L (ref 8–16)
ANISOCYTOSIS BLD QL SMEAR: (no result)
AST SERPL W P-5'-P-CCNC: 12 U/L (ref 10–37)
BASE EXCESS BLDA CALC-SCNC: -2.1 MMOL/L (ref -2–3)
BASE EXCESS BLDA CALC-SCNC: -8.2 MMOL/L (ref -2–3)
BASOPHILS # BLD AUTO: (no result) X10'3 (ref 0–0.2)
BASOPHILS NFR BLD AUTO: (no result) % (ref 0–1)
BILIRUB SERPL-MCNC: 0.3 MG/DL (ref 0.1–1)
BODY TEMPERATURE: 37
BODY TEMPERATURE: 37
BUN SERPL-MCNC: 17 MG/DL (ref 7–18)
BUN/CREAT SERPL: 17.2 (ref 5.4–32)
CALCIUM SERPL-MCNC: 8.4 MG/DL (ref 8.5–10.1)
CHLORIDE SERPL-SCNC: 102 MMOL/L (ref 99–107)
CO2 SERPL-SCNC: 31.2 MMOL/L (ref 24–32)
COHGB MFR BLDA: 0 % (ref 0.5–1.5)
COHGB MFR BLDA: 0.1 % (ref 0.5–1.5)
CREAT SERPL-MCNC: 0.99 MG/DL (ref 0.6–1.1)
EOSINOPHIL # BLD AUTO: (no result) X10'3 (ref 0–0.9)
EOSINOPHIL NFR BLD AUTO: (no result) % (ref 0–6)
ERYTHROCYTE [DISTWIDTH] IN BLOOD BY AUTOMATED COUNT: 15.7 % (ref 11.5–14.5)
GFR SERPL CREATININE-BSD FRML MDRD: 76 ML/MIN
GLUCOSE SERPL-MCNC: 150 MG/DL (ref 70–104)
HCO3 BLDA-SCNC: 24.2 MMOL/L (ref 22–26)
HCO3 BLDA-SCNC: 24.5 MMOL/L (ref 22–26)
HCT VFR BLD AUTO: 34.2 % (ref 42–52)
HGB BLD-MCNC: 11.6 G/DL (ref 14–17.9)
HGB BLDA-MCNC: 12.7 G/DL (ref 14–18)
HGB BLDA-MCNC: 13.4 G/DL (ref 14–18)
INHALED O2 FLOW RATE: 15 L/MIN
LYMPHOCYTES # BLD AUTO: (no result) X10'3 (ref 1.1–4.8)
LYMPHOCYTES NFR BLD AUTO: (no result) % (ref 21–51)
LYMPHOCYTES NFR BLD MANUAL: 95 % (ref 21–51)
MACROCYTES BLD QL SMEAR: (no result)
MAGNESIUM SERPL-MCNC: 1.4 MG/DL (ref 1.5–2.4)
MCH RBC QN AUTO: 34.2 PG (ref 27–31)
MCHC RBC AUTO-ENTMCNC: 34 % (ref 33–36.5)
MCV RBC AUTO: 100.7 FL (ref 78–98)
METHGB MFR BLDA: 0.2 % (ref 0.3–1.12)
METHGB MFR BLDA: 0.2 % (ref 0.3–1.12)
MONOCYTES # BLD AUTO: (no result) X10'3 (ref 0–0.9)
MONOCYTES NFR BLD AUTO: (no result) % (ref 2–12)
MONOCYTES NFR BLD MANUAL: 1 % (ref 2–12)
NEUTROPHILS # BLD AUTO: (no result) X10'3 (ref 1.8–7.7)
NEUTROPHILS NFR BLD AUTO: (no result) % (ref 42–75)
NEUTS SEG NFR BLD MANUAL: 4 % (ref 42–75)
O2/TOTAL GAS SETTING VFR VENT: 100 MMHG/%
O2/TOTAL GAS SETTING VFR VENT: 100 MMHG/%
OXYHGB MFR BLDA: 93.2 % (ref 94–100)
OXYHGB MFR BLDA: 99.4 % (ref 94–100)
PCO2 TEMP ADJ BLDA: 49.6 MMHG (ref 35–48)
PCO2 TEMP ADJ BLDA: 89.6 MMHG (ref 35–48)
PH TEMP ADJ BLDA: 7.05 [PH] (ref 7.35–7.45)
PH TEMP ADJ BLDA: 7.31 [PH] (ref 7.35–7.45)
PLATELET # BLD AUTO: 130 X10'3 (ref 140–440)
PLATELET BLD QL SMEAR: (no result)
PMV BLD AUTO: 7 FL (ref 7.4–10.4)
PO2 TEMP ADJ BLD: 439.8 MMHG (ref 83–108)
PO2 TEMP ADJ BLD: 90.6 MMHG (ref 83–108)
POTASSIUM SERPL-SCNC: 4.2 MMOL/L (ref 3.5–5.1)
PROT SERPL-MCNC: 6.6 G/DL (ref 6.4–8.2)
RBC # BLD AUTO: 3.4 X10'6 (ref 4.7–6.1)
RBC MORPH BLD: (no result)
RESP RATE 1H: 22 B/MIN
RESP RATE RESTING: 25 B/MIN
SAO2 % BLDA: 93.5 % (ref 95–98)
SAO2 % BLDA: 99.6 % (ref 95–98)
SMUDGE CELLS BLD QL SMEAR: (no result)
SODIUM SERPL-SCNC: 139 MMOL/L (ref 135–145)
TOTAL CELLS COUNTED FLD: 100
VOL.EXP/M/BW ON VENT: 25 L/MIN
WBC # BLD AUTO: 74.6 X10'3 (ref 4.5–11)

## 2018-02-11 PROCEDURE — 5A09457 ASSISTANCE WITH RESPIRATORY VENTILATION, 24-96 CONSECUTIVE HOURS, CONTINUOUS POSITIVE AIRWAY PRESSURE: ICD-10-PCS | Performed by: FAMILY MEDICINE

## 2018-02-11 RX ADMIN — HEPARIN SODIUM SCH UNIT: 5000 INJECTION, SOLUTION INTRAVENOUS; SUBCUTANEOUS at 20:48

## 2018-02-11 RX ADMIN — CARVEDILOL SCH MG: 6.25 TABLET, FILM COATED ORAL at 20:47

## 2018-02-11 RX ADMIN — ALBUTEROL SULFATE PRN MG: 2.5 SOLUTION RESPIRATORY (INHALATION) at 06:39

## 2018-02-11 RX ADMIN — SODIUM CHLORIDE SCH MLS/HR: 9 INJECTION INTRAMUSCULAR; INTRAVENOUS; SUBCUTANEOUS at 08:42

## 2018-02-11 RX ADMIN — GUAIFENESIN SCH MG: 100 SOLUTION ORAL at 13:00

## 2018-02-11 RX ADMIN — HEPARIN SODIUM SCH UNIT: 5000 INJECTION, SOLUTION INTRAVENOUS; SUBCUTANEOUS at 07:59

## 2018-02-11 RX ADMIN — ALBUTEROL SULFATE PRN MG: 2.5 SOLUTION RESPIRATORY (INHALATION) at 22:48

## 2018-02-11 RX ADMIN — CEFTRIAXONE SCH MLS/HR: 2 INJECTION, SOLUTION INTRAVENOUS at 20:48

## 2018-02-11 RX ADMIN — SODIUM CHLORIDE SCH MLS/HR: 9 INJECTION INTRAMUSCULAR; INTRAVENOUS; SUBCUTANEOUS at 00:07

## 2018-02-11 RX ADMIN — ALBUTEROL SULFATE PRN MG: 2.5 SOLUTION RESPIRATORY (INHALATION) at 00:16

## 2018-02-11 RX ADMIN — Medication PRN MG: at 07:54

## 2018-02-11 RX ADMIN — ALBUTEROL SULFATE PRN MG: 2.5 SOLUTION RESPIRATORY (INHALATION) at 10:41

## 2018-02-11 RX ADMIN — CEFTRIAXONE SCH MLS/HR: 2 INJECTION, SOLUTION INTRAVENOUS at 00:06

## 2018-02-11 RX ADMIN — GUAIFENESIN SCH MG: 100 SOLUTION ORAL at 11:02

## 2018-02-11 RX ADMIN — Medication SCH MMU: at 17:30

## 2018-02-11 RX ADMIN — HYDROCODONE BITARTRATE AND ACETAMINOPHEN PRN TAB: 5; 325 TABLET ORAL at 14:38

## 2018-02-11 RX ADMIN — Medication SCH MMU: at 07:55

## 2018-02-11 RX ADMIN — HYDROCODONE BITARTRATE AND ACETAMINOPHEN PRN TAB: 5; 325 TABLET ORAL at 20:55

## 2018-02-11 RX ADMIN — ALBUTEROL SULFATE PRN MG: 2.5 SOLUTION RESPIRATORY (INHALATION) at 14:34

## 2018-02-11 RX ADMIN — HYDROCODONE BITARTRATE AND ACETAMINOPHEN PRN TAB: 5; 325 TABLET ORAL at 00:05

## 2018-02-11 RX ADMIN — Medication PRN MG: at 23:11

## 2018-02-11 RX ADMIN — CARVEDILOL SCH MG: 6.25 TABLET, FILM COATED ORAL at 07:56

## 2018-02-11 RX ADMIN — GUAIFENESIN SCH MG: 100 SOLUTION ORAL at 20:56

## 2018-02-11 RX ADMIN — Medication SCH MG: at 07:56

## 2018-02-12 VITALS — SYSTOLIC BLOOD PRESSURE: 110 MMHG | DIASTOLIC BLOOD PRESSURE: 56 MMHG

## 2018-02-12 VITALS — DIASTOLIC BLOOD PRESSURE: 61 MMHG | SYSTOLIC BLOOD PRESSURE: 90 MMHG

## 2018-02-12 VITALS — SYSTOLIC BLOOD PRESSURE: 107 MMHG | DIASTOLIC BLOOD PRESSURE: 71 MMHG

## 2018-02-12 VITALS — DIASTOLIC BLOOD PRESSURE: 73 MMHG | SYSTOLIC BLOOD PRESSURE: 112 MMHG

## 2018-02-12 VITALS — SYSTOLIC BLOOD PRESSURE: 104 MMHG | DIASTOLIC BLOOD PRESSURE: 88 MMHG

## 2018-02-12 VITALS — SYSTOLIC BLOOD PRESSURE: 103 MMHG | DIASTOLIC BLOOD PRESSURE: 74 MMHG

## 2018-02-12 VITALS — DIASTOLIC BLOOD PRESSURE: 70 MMHG | SYSTOLIC BLOOD PRESSURE: 101 MMHG

## 2018-02-12 VITALS — DIASTOLIC BLOOD PRESSURE: 73 MMHG | SYSTOLIC BLOOD PRESSURE: 110 MMHG

## 2018-02-12 VITALS — DIASTOLIC BLOOD PRESSURE: 56 MMHG | SYSTOLIC BLOOD PRESSURE: 110 MMHG

## 2018-02-12 VITALS — DIASTOLIC BLOOD PRESSURE: 87 MMHG | SYSTOLIC BLOOD PRESSURE: 107 MMHG

## 2018-02-12 LAB
ALBUMIN SERPL BCP-MCNC: 3.4 G/DL (ref 3.4–5)
ALBUMIN/GLOB SERPL: 1.1 {RATIO} (ref 1.1–1.5)
ALP SERPL-CCNC: 84 IU/L (ref 46–116)
ALT SERPL W P-5'-P-CCNC: 23 U/L (ref 12–78)
ANION GAP SERPL CALCULATED.3IONS-SCNC: 10 MMOL/L (ref 8–16)
ANISOCYTOSIS BLD QL SMEAR: (no result)
AST SERPL W P-5'-P-CCNC: 15 U/L (ref 10–37)
BASE EXCESS BLDA CALC-SCNC: 2.2 MMOL/L (ref -2–3)
BASOPHILS # BLD AUTO: 0.6 X10'3 (ref 0–0.2)
BASOPHILS NFR BLD AUTO: 1.1 % (ref 0–1)
BILIRUB SERPL-MCNC: 0.4 MG/DL (ref 0.1–1)
BODY TEMPERATURE: 37
BUN SERPL-MCNC: 17 MG/DL (ref 7–18)
BUN/CREAT SERPL: 14 (ref 5.4–32)
CALCIUM SERPL-MCNC: 8.4 MG/DL (ref 8.5–10.1)
CHLORIDE SERPL-SCNC: 102 MMOL/L (ref 99–107)
CO2 SERPL-SCNC: 28.2 MMOL/L (ref 24–32)
COHGB MFR BLDA: 0.3 % (ref 0.5–1.5)
CREAT SERPL-MCNC: 1.21 MG/DL (ref 0.6–1.1)
EOSINOPHIL # BLD AUTO: 0.1 X10'3 (ref 0–0.9)
EOSINOPHIL NFR BLD AUTO: 0.2 % (ref 0–6)
ERYTHROCYTE [DISTWIDTH] IN BLOOD BY AUTOMATED COUNT: 15.8 % (ref 11.5–14.5)
GFR SERPL CREATININE-BSD FRML MDRD: 60 ML/MIN
GLUCOSE SERPL-MCNC: 98 MG/DL (ref 70–104)
HCO3 BLDA-SCNC: 26.8 MMOL/L (ref 22–26)
HCT VFR BLD AUTO: 34.1 % (ref 42–52)
HGB BLD-MCNC: 11.5 G/DL (ref 14–17.9)
HGB BLDA-MCNC: 11.8 G/DL (ref 14–18)
LYMPHOCYTES # BLD AUTO: 42.6 X10'3 (ref 1.1–4.8)
LYMPHOCYTES NFR BLD AUTO: 87.6 % (ref 21–51)
LYMPHOCYTES NFR BLD MANUAL: 91 % (ref 21–51)
MACROCYTES BLD QL SMEAR: (no result)
MAGNESIUM SERPL-MCNC: 1.5 MG/DL (ref 1.5–2.4)
MCH RBC QN AUTO: 34.1 PG (ref 27–31)
MCHC RBC AUTO-ENTMCNC: 33.8 % (ref 33–36.5)
MCV RBC AUTO: 100.8 FL (ref 78–98)
METHGB MFR BLDA: 0.1 % (ref 0.3–1.12)
MONOCYTES # BLD AUTO: 0.1 X10'3 (ref 0–0.9)
MONOCYTES NFR BLD AUTO: 0.2 % (ref 2–12)
MONOCYTES NFR BLD MANUAL: 1 % (ref 2–12)
NEUTROPHILS # BLD AUTO: 5.3 X10'3 (ref 1.8–7.7)
NEUTROPHILS NFR BLD AUTO: 10.9 % (ref 42–75)
NEUTS SEG NFR BLD MANUAL: 8 % (ref 42–75)
O2/TOTAL GAS SETTING VFR VENT: 35 MMHG/%
OXYHGB MFR BLDA: 97 % (ref 94–100)
PCO2 TEMP ADJ BLDA: 41.7 MMHG (ref 35–48)
PH TEMP ADJ BLDA: 7.43 [PH] (ref 7.35–7.45)
PLATELET # BLD AUTO: 112 X10'3 (ref 140–440)
PLATELET BLD QL SMEAR: (no result)
PMV BLD AUTO: 7.2 FL (ref 7.4–10.4)
PO2 TEMP ADJ BLD: 99 MMHG (ref 83–108)
POLYCHROMASIA BLD QL SMEAR: (no result)
POTASSIUM SERPL-SCNC: 4 MMOL/L (ref 3.5–5.1)
PROT SERPL-MCNC: 6.5 G/DL (ref 6.4–8.2)
RBC # BLD AUTO: 3.38 X10'6 (ref 4.7–6.1)
RBC MORPH BLD: (no result)
RESP RATE 1H: 22 B/MIN
RESP RATE RESTING: 26 B/MIN
SAO2 % BLDA: 97.4 % (ref 95–98)
SMUDGE CELLS BLD QL SMEAR: (no result)
SODIUM SERPL-SCNC: 140 MMOL/L (ref 135–145)
TOTAL CELLS COUNTED FLD: 100
VOL.EXP/M/BW ON VENT: 20 L/MIN
WBC # BLD AUTO: 48.6 X10'3 (ref 4.5–11)

## 2018-02-12 RX ADMIN — ALBUTEROL SULFATE PRN MG: 2.5 SOLUTION RESPIRATORY (INHALATION) at 16:33

## 2018-02-12 RX ADMIN — HYDROCODONE BITARTRATE AND ACETAMINOPHEN PRN TAB: 5; 325 TABLET ORAL at 13:04

## 2018-02-12 RX ADMIN — HYDROCODONE BITARTRATE AND ACETAMINOPHEN PRN TAB: 5; 325 TABLET ORAL at 08:25

## 2018-02-12 RX ADMIN — CEFTRIAXONE SCH MLS/HR: 2 INJECTION, SOLUTION INTRAVENOUS at 21:24

## 2018-02-12 RX ADMIN — HYDROCODONE BITARTRATE AND ACETAMINOPHEN PRN TAB: 5; 325 TABLET ORAL at 21:29

## 2018-02-12 RX ADMIN — CARVEDILOL SCH MG: 6.25 TABLET, FILM COATED ORAL at 21:22

## 2018-02-12 RX ADMIN — ALBUTEROL SULFATE PRN MG: 2.5 SOLUTION RESPIRATORY (INHALATION) at 23:09

## 2018-02-12 RX ADMIN — ALBUTEROL SULFATE PRN MG: 2.5 SOLUTION RESPIRATORY (INHALATION) at 12:09

## 2018-02-12 RX ADMIN — HEPARIN SODIUM SCH UNIT: 5000 INJECTION, SOLUTION INTRAVENOUS; SUBCUTANEOUS at 21:25

## 2018-02-12 RX ADMIN — CARVEDILOL SCH MG: 6.25 TABLET, FILM COATED ORAL at 08:24

## 2018-02-12 RX ADMIN — HEPARIN SODIUM SCH UNIT: 5000 INJECTION, SOLUTION INTRAVENOUS; SUBCUTANEOUS at 08:00

## 2018-02-12 RX ADMIN — Medication SCH MMU: at 08:25

## 2018-02-12 RX ADMIN — FLUTICASONE FUROATE AND VILANTEROL TRIFENATATE SCH PUFF: 200; 25 POWDER RESPIRATORY (INHALATION) at 07:56

## 2018-02-12 RX ADMIN — GUAIFENESIN SCH MG: 100 SOLUTION ORAL at 21:29

## 2018-02-12 RX ADMIN — Medication SCH MMU: at 17:21

## 2018-02-12 RX ADMIN — HEPARIN SODIUM SCH UNIT: 5000 INJECTION, SOLUTION INTRAVENOUS; SUBCUTANEOUS at 08:28

## 2018-02-12 RX ADMIN — GUAIFENESIN SCH MG: 100 SOLUTION ORAL at 08:23

## 2018-02-12 RX ADMIN — GUAIFENESIN SCH MG: 100 SOLUTION ORAL at 13:05

## 2018-02-12 RX ADMIN — Medication SCH MG: at 08:26

## 2018-02-13 VITALS — SYSTOLIC BLOOD PRESSURE: 104 MMHG | DIASTOLIC BLOOD PRESSURE: 66 MMHG

## 2018-02-13 VITALS — SYSTOLIC BLOOD PRESSURE: 110 MMHG | DIASTOLIC BLOOD PRESSURE: 73 MMHG

## 2018-02-13 VITALS — DIASTOLIC BLOOD PRESSURE: 73 MMHG | SYSTOLIC BLOOD PRESSURE: 110 MMHG

## 2018-02-13 VITALS — SYSTOLIC BLOOD PRESSURE: 112 MMHG | DIASTOLIC BLOOD PRESSURE: 57 MMHG

## 2018-02-13 VITALS — SYSTOLIC BLOOD PRESSURE: 110 MMHG | DIASTOLIC BLOOD PRESSURE: 56 MMHG

## 2018-02-13 VITALS — SYSTOLIC BLOOD PRESSURE: 112 MMHG | DIASTOLIC BLOOD PRESSURE: 69 MMHG

## 2018-02-13 VITALS — SYSTOLIC BLOOD PRESSURE: 93 MMHG | DIASTOLIC BLOOD PRESSURE: 57 MMHG

## 2018-02-13 VITALS — DIASTOLIC BLOOD PRESSURE: 77 MMHG | SYSTOLIC BLOOD PRESSURE: 106 MMHG

## 2018-02-13 VITALS — DIASTOLIC BLOOD PRESSURE: 68 MMHG | SYSTOLIC BLOOD PRESSURE: 102 MMHG

## 2018-02-13 VITALS — DIASTOLIC BLOOD PRESSURE: 66 MMHG | SYSTOLIC BLOOD PRESSURE: 106 MMHG

## 2018-02-13 LAB
ALBUMIN SERPL BCP-MCNC: 3.3 G/DL (ref 3.4–5)
ALBUMIN/GLOB SERPL: 1.1 {RATIO} (ref 1.1–1.5)
ALP SERPL-CCNC: 81 IU/L (ref 46–116)
ALT SERPL W P-5'-P-CCNC: 19 U/L (ref 12–78)
ANION GAP SERPL CALCULATED.3IONS-SCNC: 8 MMOL/L (ref 8–16)
AST SERPL W P-5'-P-CCNC: 13 U/L (ref 10–37)
BASOPHILS # BLD AUTO: 0.3 X10'3 (ref 0–0.2)
BASOPHILS NFR BLD AUTO: 0.7 % (ref 0–1)
BILIRUB SERPL-MCNC: 0.4 MG/DL (ref 0.1–1)
BUN SERPL-MCNC: 19 MG/DL (ref 7–18)
BUN/CREAT SERPL: 17.9 (ref 5.4–32)
CALCIUM SERPL-MCNC: 8.5 MG/DL (ref 8.5–10.1)
CHLORIDE SERPL-SCNC: 102 MMOL/L (ref 99–107)
CO2 SERPL-SCNC: 29.2 MMOL/L (ref 24–32)
CREAT SERPL-MCNC: 1.06 MG/DL (ref 0.6–1.1)
EOSINOPHIL # BLD AUTO: 0.2 X10'3 (ref 0–0.9)
EOSINOPHIL NFR BLD AUTO: 0.5 % (ref 0–6)
ERYTHROCYTE [DISTWIDTH] IN BLOOD BY AUTOMATED COUNT: 16.3 % (ref 11.5–14.5)
GFR SERPL CREATININE-BSD FRML MDRD: 70 ML/MIN
GLUCOSE SERPL-MCNC: 95 MG/DL (ref 70–104)
HCT VFR BLD AUTO: 33 % (ref 42–52)
HGB BLD-MCNC: 10.9 G/DL (ref 14–17.9)
LYMPHOCYTES # BLD AUTO: 38.5 X10'3 (ref 1.1–4.8)
LYMPHOCYTES NFR BLD AUTO: 88.9 % (ref 21–51)
LYMPHOCYTES NFR BLD MANUAL: 92 % (ref 21–51)
MAGNESIUM SERPL-MCNC: 1.7 MG/DL (ref 1.5–2.4)
MCH RBC QN AUTO: 33.5 PG (ref 27–31)
MCHC RBC AUTO-ENTMCNC: 33.1 % (ref 33–36.5)
MCV RBC AUTO: 101.3 FL (ref 78–98)
MONOCYTES # BLD AUTO: 0.2 X10'3 (ref 0–0.9)
MONOCYTES NFR BLD AUTO: 0.5 % (ref 2–12)
MONOCYTES NFR BLD MANUAL: 1 % (ref 2–12)
NEUTROPHILS # BLD AUTO: 4.1 X10'3 (ref 1.8–7.7)
NEUTROPHILS NFR BLD AUTO: 9.4 % (ref 42–75)
NEUTS SEG NFR BLD MANUAL: 7 % (ref 42–75)
PLATELET # BLD AUTO: 113 X10'3 (ref 140–440)
PLATELET BLD QL SMEAR: (no result)
PMV BLD AUTO: 7.6 FL (ref 7.4–10.4)
POTASSIUM SERPL-SCNC: 3.8 MMOL/L (ref 3.5–5.1)
PROT SERPL-MCNC: 6.3 G/DL (ref 6.4–8.2)
RBC # BLD AUTO: 3.26 X10'6 (ref 4.7–6.1)
RBC MORPH BLD: NORMAL
SMUDGE CELLS BLD QL SMEAR: (no result)
SODIUM SERPL-SCNC: 139 MMOL/L (ref 135–145)
TOTAL CELLS COUNTED FLD: 100
WBC # BLD AUTO: 43.3 X10'3 (ref 4.5–11)

## 2018-02-13 RX ADMIN — ALBUTEROL SULFATE PRN MG: 2.5 SOLUTION RESPIRATORY (INHALATION) at 08:14

## 2018-02-13 RX ADMIN — GUAIFENESIN SCH MG: 100 SOLUTION ORAL at 07:34

## 2018-02-13 RX ADMIN — GUAIFENESIN SCH MG: 100 SOLUTION ORAL at 21:21

## 2018-02-13 RX ADMIN — Medication SCH MG: at 07:33

## 2018-02-13 RX ADMIN — ALBUTEROL SULFATE PRN MG: 2.5 SOLUTION RESPIRATORY (INHALATION) at 19:57

## 2018-02-13 RX ADMIN — HEPARIN SODIUM SCH UNIT: 5000 INJECTION, SOLUTION INTRAVENOUS; SUBCUTANEOUS at 21:13

## 2018-02-13 RX ADMIN — CEFTRIAXONE SCH MLS/HR: 2 INJECTION, SOLUTION INTRAVENOUS at 21:13

## 2018-02-13 RX ADMIN — ALBUTEROL SULFATE PRN MG: 2.5 SOLUTION RESPIRATORY (INHALATION) at 04:45

## 2018-02-13 RX ADMIN — HEPARIN SODIUM SCH UNIT: 5000 INJECTION, SOLUTION INTRAVENOUS; SUBCUTANEOUS at 07:32

## 2018-02-13 RX ADMIN — HYDROCODONE BITARTRATE AND ACETAMINOPHEN PRN TAB: 5; 325 TABLET ORAL at 07:33

## 2018-02-13 RX ADMIN — FLUTICASONE FUROATE AND VILANTEROL TRIFENATATE SCH PUFF: 200; 25 POWDER RESPIRATORY (INHALATION) at 08:14

## 2018-02-13 RX ADMIN — GUAIFENESIN SCH MG: 100 SOLUTION ORAL at 13:00

## 2018-02-13 RX ADMIN — HYDROCODONE BITARTRATE AND ACETAMINOPHEN PRN TAB: 5; 325 TABLET ORAL at 21:11

## 2018-02-13 RX ADMIN — CARVEDILOL SCH MG: 6.25 TABLET, FILM COATED ORAL at 07:35

## 2018-02-13 RX ADMIN — Medication SCH MMU: at 17:15

## 2018-02-13 RX ADMIN — Medication SCH MMU: at 07:34

## 2018-02-13 RX ADMIN — ALBUTEROL SULFATE PRN MG: 2.5 SOLUTION RESPIRATORY (INHALATION) at 14:04

## 2018-02-13 RX ADMIN — CARVEDILOL SCH MG: 6.25 TABLET, FILM COATED ORAL at 21:11

## 2018-02-14 VITALS — SYSTOLIC BLOOD PRESSURE: 88 MMHG | DIASTOLIC BLOOD PRESSURE: 55 MMHG

## 2018-02-14 VITALS — SYSTOLIC BLOOD PRESSURE: 98 MMHG | DIASTOLIC BLOOD PRESSURE: 65 MMHG

## 2018-02-14 VITALS — SYSTOLIC BLOOD PRESSURE: 102 MMHG | DIASTOLIC BLOOD PRESSURE: 70 MMHG

## 2018-02-14 VITALS — SYSTOLIC BLOOD PRESSURE: 98 MMHG | DIASTOLIC BLOOD PRESSURE: 70 MMHG

## 2018-02-14 VITALS — SYSTOLIC BLOOD PRESSURE: 116 MMHG | DIASTOLIC BLOOD PRESSURE: 78 MMHG

## 2018-02-14 VITALS — SYSTOLIC BLOOD PRESSURE: 98 MMHG | DIASTOLIC BLOOD PRESSURE: 67 MMHG

## 2018-02-14 VITALS — SYSTOLIC BLOOD PRESSURE: 97 MMHG | DIASTOLIC BLOOD PRESSURE: 71 MMHG

## 2018-02-14 LAB
ALBUMIN SERPL BCP-MCNC: 3.3 G/DL (ref 3.4–5)
ALBUMIN/GLOB SERPL: 1.1 {RATIO} (ref 1.1–1.5)
ALP SERPL-CCNC: 78 IU/L (ref 46–116)
ALT SERPL W P-5'-P-CCNC: 26 U/L (ref 12–78)
ANION GAP SERPL CALCULATED.3IONS-SCNC: 7 MMOL/L (ref 8–16)
ANISOCYTOSIS BLD QL SMEAR: (no result)
AST SERPL W P-5'-P-CCNC: 14 U/L (ref 10–37)
BASOPHILS # BLD AUTO: 0.5 X10'3 (ref 0–0.2)
BASOPHILS NFR BLD AUTO: 1.4 % (ref 0–1)
BILIRUB SERPL-MCNC: 0.3 MG/DL (ref 0.1–1)
BUN SERPL-MCNC: 18 MG/DL (ref 7–18)
BUN/CREAT SERPL: 15.8 (ref 5.4–32)
CALCIUM SERPL-MCNC: 8.5 MG/DL (ref 8.5–10.1)
CHLORIDE SERPL-SCNC: 101 MMOL/L (ref 99–107)
CO2 SERPL-SCNC: 32.1 MMOL/L (ref 24–32)
CREAT SERPL-MCNC: 1.14 MG/DL (ref 0.6–1.1)
EOSINOPHIL # BLD AUTO: 0.2 X10'3 (ref 0–0.9)
EOSINOPHIL NFR BLD AUTO: 0.5 % (ref 0–6)
ERYTHROCYTE [DISTWIDTH] IN BLOOD BY AUTOMATED COUNT: 16.2 % (ref 11.5–14.5)
GFR SERPL CREATININE-BSD FRML MDRD: 64 ML/MIN
GLUCOSE SERPL-MCNC: 94 MG/DL (ref 70–104)
HCT VFR BLD AUTO: 32.2 % (ref 42–52)
HGB BLD-MCNC: 10.9 G/DL (ref 14–17.9)
LYMPHOCYTES # BLD AUTO: 33.2 X10'3 (ref 1.1–4.8)
LYMPHOCYTES NFR BLD AUTO: 86.9 % (ref 21–51)
LYMPHOCYTES NFR BLD MANUAL: 88 % (ref 21–51)
MAGNESIUM SERPL-MCNC: 1.8 MG/DL (ref 1.5–2.4)
MCH RBC QN AUTO: 34.1 PG (ref 27–31)
MCHC RBC AUTO-ENTMCNC: 33.8 % (ref 33–36.5)
MCV RBC AUTO: 100.8 FL (ref 78–98)
MONOCYTES # BLD AUTO: 0.5 X10'3 (ref 0–0.9)
MONOCYTES NFR BLD AUTO: 1.4 % (ref 2–12)
MONOCYTES NFR BLD MANUAL: 2 % (ref 2–12)
NEUTROPHILS # BLD AUTO: 3.7 X10'3 (ref 1.8–7.7)
NEUTROPHILS NFR BLD AUTO: 9.8 % (ref 42–75)
NEUTS SEG NFR BLD MANUAL: 10 % (ref 42–75)
PLATELET # BLD AUTO: 110 X10'3 (ref 140–440)
PLATELET BLD QL SMEAR: (no result)
PMV BLD AUTO: 7.4 FL (ref 7.4–10.4)
POTASSIUM SERPL-SCNC: 4 MMOL/L (ref 3.5–5.1)
PROT SERPL-MCNC: 6.3 G/DL (ref 6.4–8.2)
RBC # BLD AUTO: 3.2 X10'6 (ref 4.7–6.1)
RBC MORPH BLD: (no result)
SMUDGE CELLS BLD QL SMEAR: (no result)
SODIUM SERPL-SCNC: 140 MMOL/L (ref 135–145)
TOTAL CELLS COUNTED FLD: 100
WBC # BLD AUTO: 38.2 X10'3 (ref 4.5–11)

## 2018-02-14 RX ADMIN — Medication SCH MMU: at 07:33

## 2018-02-14 RX ADMIN — FLUTICASONE FUROATE AND VILANTEROL TRIFENATATE SCH PUFF: 200; 25 POWDER RESPIRATORY (INHALATION) at 09:55

## 2018-02-14 RX ADMIN — ALBUTEROL SULFATE PRN MG: 2.5 SOLUTION RESPIRATORY (INHALATION) at 18:45

## 2018-02-14 RX ADMIN — CARVEDILOL SCH MG: 6.25 TABLET, FILM COATED ORAL at 07:37

## 2018-02-14 RX ADMIN — HYDROCODONE BITARTRATE AND ACETAMINOPHEN PRN TAB: 5; 325 TABLET ORAL at 11:01

## 2018-02-14 RX ADMIN — HYDROCODONE BITARTRATE AND ACETAMINOPHEN PRN TAB: 5; 325 TABLET ORAL at 23:20

## 2018-02-14 RX ADMIN — CEFTRIAXONE SCH MLS/HR: 2 INJECTION, SOLUTION INTRAVENOUS at 20:42

## 2018-02-14 RX ADMIN — GUAIFENESIN SCH MG: 100 SOLUTION ORAL at 07:37

## 2018-02-14 RX ADMIN — Medication SCH MMU: at 16:53

## 2018-02-14 RX ADMIN — HYDROCODONE BITARTRATE AND ACETAMINOPHEN PRN TAB: 5; 325 TABLET ORAL at 19:06

## 2018-02-14 RX ADMIN — ALBUTEROL SULFATE PRN MG: 2.5 SOLUTION RESPIRATORY (INHALATION) at 09:55

## 2018-02-14 RX ADMIN — CARVEDILOL SCH MG: 6.25 TABLET, FILM COATED ORAL at 20:00

## 2018-02-14 RX ADMIN — GUAIFENESIN SCH MG: 100 SOLUTION ORAL at 13:00

## 2018-02-14 RX ADMIN — HEPARIN SODIUM SCH UNIT: 5000 INJECTION, SOLUTION INTRAVENOUS; SUBCUTANEOUS at 20:43

## 2018-02-14 RX ADMIN — HEPARIN SODIUM SCH UNIT: 5000 INJECTION, SOLUTION INTRAVENOUS; SUBCUTANEOUS at 07:38

## 2018-02-14 RX ADMIN — PANTOPRAZOLE SODIUM SCH MG: 40 TABLET, DELAYED RELEASE ORAL at 11:01

## 2018-02-14 RX ADMIN — Medication SCH MG: at 07:37

## 2018-02-14 RX ADMIN — GUAIFENESIN SCH MG: 100 SOLUTION ORAL at 20:44

## 2018-02-15 VITALS — SYSTOLIC BLOOD PRESSURE: 91 MMHG | DIASTOLIC BLOOD PRESSURE: 63 MMHG

## 2018-02-15 VITALS — DIASTOLIC BLOOD PRESSURE: 66 MMHG | SYSTOLIC BLOOD PRESSURE: 95 MMHG

## 2018-02-15 VITALS — SYSTOLIC BLOOD PRESSURE: 113 MMHG | DIASTOLIC BLOOD PRESSURE: 68 MMHG

## 2018-02-15 VITALS — DIASTOLIC BLOOD PRESSURE: 68 MMHG | SYSTOLIC BLOOD PRESSURE: 105 MMHG

## 2018-02-15 VITALS — SYSTOLIC BLOOD PRESSURE: 92 MMHG | DIASTOLIC BLOOD PRESSURE: 63 MMHG

## 2018-02-15 LAB
ALBUMIN SERPL BCP-MCNC: 3.3 G/DL (ref 3.4–5)
ALBUMIN/GLOB SERPL: 1.2 {RATIO} (ref 1.1–1.5)
ALP SERPL-CCNC: 76 IU/L (ref 46–116)
ALT SERPL W P-5'-P-CCNC: 24 U/L (ref 12–78)
ANION GAP SERPL CALCULATED.3IONS-SCNC: 4 MMOL/L (ref 8–16)
ANISOCYTOSIS BLD QL SMEAR: (no result)
AST SERPL W P-5'-P-CCNC: 9 U/L (ref 10–37)
BASOPHILS # BLD AUTO: (no result) X10'3 (ref 0–0.2)
BASOPHILS NFR BLD AUTO: (no result) % (ref 0–1)
BILIRUB SERPL-MCNC: 0.3 MG/DL (ref 0.1–1)
BUN SERPL-MCNC: 18 MG/DL (ref 7–18)
BUN/CREAT SERPL: 15 (ref 5.4–32)
CALCIUM SERPL-MCNC: 8.4 MG/DL (ref 8.5–10.1)
CHLORIDE SERPL-SCNC: 102 MMOL/L (ref 99–107)
CO2 SERPL-SCNC: 33.6 MMOL/L (ref 24–32)
CREAT SERPL-MCNC: 1.2 MG/DL (ref 0.6–1.1)
EOSINOPHIL # BLD AUTO: (no result) X10'3 (ref 0–0.9)
EOSINOPHIL NFR BLD AUTO: (no result) % (ref 0–6)
ERYTHROCYTE [DISTWIDTH] IN BLOOD BY AUTOMATED COUNT: 16.4 % (ref 11.5–14.5)
GFR SERPL CREATININE-BSD FRML MDRD: 61 ML/MIN
GLUCOSE SERPL-MCNC: 92 MG/DL (ref 70–104)
HCT VFR BLD AUTO: 32.2 % (ref 42–52)
HGB BLD-MCNC: 10.9 G/DL (ref 14–17.9)
LYMPHOCYTES # BLD AUTO: (no result) X10'3 (ref 1.1–4.8)
LYMPHOCYTES NFR BLD AUTO: (no result) % (ref 21–51)
LYMPHOCYTES NFR BLD MANUAL: 93 % (ref 21–51)
MAGNESIUM SERPL-MCNC: 2 MG/DL (ref 1.5–2.4)
MCH RBC QN AUTO: 34.3 PG (ref 27–31)
MCHC RBC AUTO-ENTMCNC: 33.7 % (ref 33–36.5)
MCV RBC AUTO: 101.8 FL (ref 78–98)
MONOCYTES # BLD AUTO: (no result) X10'3 (ref 0–0.9)
MONOCYTES NFR BLD AUTO: (no result) % (ref 2–12)
MONOCYTES NFR BLD MANUAL: 1 % (ref 2–12)
NEUTROPHILS # BLD AUTO: (no result) X10'3 (ref 1.8–7.7)
NEUTROPHILS NFR BLD AUTO: (no result) % (ref 42–75)
NEUTS SEG NFR BLD MANUAL: 6 % (ref 42–75)
PLATELET # BLD AUTO: 118 X10'3 (ref 140–440)
PLATELET BLD QL SMEAR: (no result)
PMV BLD AUTO: 7.7 FL (ref 7.4–10.4)
POTASSIUM SERPL-SCNC: 4 MMOL/L (ref 3.5–5.1)
PROT SERPL-MCNC: 6.1 G/DL (ref 6.4–8.2)
RBC # BLD AUTO: 3.16 X10'6 (ref 4.7–6.1)
RBC MORPH BLD: (no result)
SMUDGE CELLS BLD QL SMEAR: (no result)
SODIUM SERPL-SCNC: 140 MMOL/L (ref 135–145)
STOMATOCYTES BLD QL SMEAR: (no result)
TOTAL CELLS COUNTED FLD: 100
WBC # BLD AUTO: 40.7 X10'3 (ref 4.5–11)

## 2018-02-15 RX ADMIN — HYDROCODONE BITARTRATE AND ACETAMINOPHEN PRN TAB: 5; 325 TABLET ORAL at 04:44

## 2018-02-15 RX ADMIN — GUAIFENESIN SCH MG: 100 SOLUTION ORAL at 07:47

## 2018-02-15 RX ADMIN — HYDROCODONE BITARTRATE AND ACETAMINOPHEN PRN TAB: 5; 325 TABLET ORAL at 14:26

## 2018-02-15 RX ADMIN — CEFTRIAXONE SCH MLS/HR: 2 INJECTION, SOLUTION INTRAVENOUS at 19:30

## 2018-02-15 RX ADMIN — HEPARIN SODIUM SCH UNIT: 5000 INJECTION, SOLUTION INTRAVENOUS; SUBCUTANEOUS at 07:46

## 2018-02-15 RX ADMIN — CARVEDILOL SCH MG: 6.25 TABLET, FILM COATED ORAL at 19:30

## 2018-02-15 RX ADMIN — Medication SCH MG: at 07:45

## 2018-02-15 RX ADMIN — HEPARIN SODIUM SCH UNIT: 5000 INJECTION, SOLUTION INTRAVENOUS; SUBCUTANEOUS at 19:31

## 2018-02-15 RX ADMIN — Medication SCH MMU: at 07:45

## 2018-02-15 RX ADMIN — GUAIFENESIN SCH MG: 100 SOLUTION ORAL at 13:00

## 2018-02-15 RX ADMIN — FLUTICASONE FUROATE AND VILANTEROL TRIFENATATE SCH PUFF: 200; 25 POWDER RESPIRATORY (INHALATION) at 08:43

## 2018-02-15 RX ADMIN — ALBUTEROL SULFATE PRN MG: 2.5 SOLUTION RESPIRATORY (INHALATION) at 08:43

## 2018-02-15 RX ADMIN — GUAIFENESIN SCH MG: 100 SOLUTION ORAL at 19:29

## 2018-02-15 RX ADMIN — Medication SCH MMU: at 17:11

## 2018-02-15 RX ADMIN — ALBUTEROL SULFATE PRN MG: 2.5 SOLUTION RESPIRATORY (INHALATION) at 21:36

## 2018-02-15 RX ADMIN — PANTOPRAZOLE SODIUM SCH MG: 40 TABLET, DELAYED RELEASE ORAL at 07:44

## 2018-02-15 RX ADMIN — CARVEDILOL SCH MG: 6.25 TABLET, FILM COATED ORAL at 07:47

## 2018-02-15 RX ADMIN — HYDROCODONE BITARTRATE AND ACETAMINOPHEN PRN TAB: 5; 325 TABLET ORAL at 19:29

## 2018-02-15 RX ADMIN — HYDROCODONE BITARTRATE AND ACETAMINOPHEN PRN TAB: 5; 325 TABLET ORAL at 09:25

## 2018-02-16 VITALS — DIASTOLIC BLOOD PRESSURE: 65 MMHG | SYSTOLIC BLOOD PRESSURE: 103 MMHG

## 2018-02-16 VITALS — SYSTOLIC BLOOD PRESSURE: 114 MMHG | DIASTOLIC BLOOD PRESSURE: 74 MMHG

## 2018-02-16 VITALS — SYSTOLIC BLOOD PRESSURE: 95 MMHG | DIASTOLIC BLOOD PRESSURE: 71 MMHG

## 2018-02-16 LAB
ALBUMIN SERPL BCP-MCNC: 3.4 G/DL (ref 3.4–5)
ANION GAP SERPL CALCULATED.3IONS-SCNC: 9 MMOL/L (ref 8–16)
BUN SERPL-MCNC: 19 MG/DL (ref 7–18)
BUN/CREAT SERPL: 18.4 (ref 5.4–32)
CALCIUM SERPL-MCNC: 8.8 MG/DL (ref 8.5–10.1)
CHLORIDE SERPL-SCNC: 101 MMOL/L (ref 99–107)
CO2 SERPL-SCNC: 29.9 MMOL/L (ref 24–32)
CREAT SERPL-MCNC: 1.03 MG/DL (ref 0.6–1.1)
GFR SERPL CREATININE-BSD FRML MDRD: 72 ML/MIN
GLUCOSE SERPL-MCNC: 94 MG/DL (ref 70–104)
MAGNESIUM SERPL-MCNC: 2 MG/DL (ref 1.5–2.4)
POTASSIUM SERPL-SCNC: 4.1 MMOL/L (ref 3.5–5.1)
SODIUM SERPL-SCNC: 140 MMOL/L (ref 135–145)

## 2018-02-16 RX ADMIN — FLUTICASONE FUROATE AND VILANTEROL TRIFENATATE SCH PUFF: 200; 25 POWDER RESPIRATORY (INHALATION) at 08:25

## 2018-02-16 RX ADMIN — ALBUTEROL SULFATE PRN MG: 2.5 SOLUTION RESPIRATORY (INHALATION) at 08:24

## 2018-02-16 RX ADMIN — GUAIFENESIN SCH MG: 100 SOLUTION ORAL at 07:20

## 2018-02-16 RX ADMIN — CARVEDILOL SCH MG: 6.25 TABLET, FILM COATED ORAL at 07:27

## 2018-02-16 RX ADMIN — Medication SCH MG: at 07:19

## 2018-02-16 RX ADMIN — GUAIFENESIN SCH MG: 100 SOLUTION ORAL at 13:21

## 2018-02-16 RX ADMIN — PANTOPRAZOLE SODIUM SCH MG: 40 TABLET, DELAYED RELEASE ORAL at 07:19

## 2018-02-16 RX ADMIN — HYDROCODONE BITARTRATE AND ACETAMINOPHEN PRN TAB: 5; 325 TABLET ORAL at 13:21

## 2018-02-16 RX ADMIN — HEPARIN SODIUM SCH UNIT: 5000 INJECTION, SOLUTION INTRAVENOUS; SUBCUTANEOUS at 07:19

## 2018-02-16 RX ADMIN — Medication SCH MMU: at 07:20

## 2018-02-16 RX ADMIN — HYDROCODONE BITARTRATE AND ACETAMINOPHEN PRN TAB: 5; 325 TABLET ORAL at 05:36

## 2018-03-02 ENCOUNTER — HOSPITAL ENCOUNTER (EMERGENCY)
Dept: HOSPITAL 94 - ER | Age: 68
Discharge: HOME | End: 2018-03-02
Payer: OTHER GOVERNMENT

## 2018-03-02 VITALS — HEIGHT: 74 IN | WEIGHT: 245.37 LBS | BODY MASS INDEX: 31.49 KG/M2

## 2018-03-02 VITALS — SYSTOLIC BLOOD PRESSURE: 100 MMHG | DIASTOLIC BLOOD PRESSURE: 60 MMHG

## 2018-03-02 DIAGNOSIS — J18.9: Primary | ICD-10-CM

## 2018-03-02 DIAGNOSIS — I50.9: ICD-10-CM

## 2018-03-02 DIAGNOSIS — Z79.899: ICD-10-CM

## 2018-03-02 DIAGNOSIS — Z98.890: ICD-10-CM

## 2018-03-02 DIAGNOSIS — I11.0: ICD-10-CM

## 2018-03-02 DIAGNOSIS — K21.9: ICD-10-CM

## 2018-03-02 DIAGNOSIS — J44.0: ICD-10-CM

## 2018-03-02 DIAGNOSIS — Z79.82: ICD-10-CM

## 2018-03-02 LAB
ALBUMIN SERPL BCP-MCNC: 3.7 G/DL (ref 3.4–5)
ALBUMIN/GLOB SERPL: 1 {RATIO} (ref 1.1–1.5)
ALP SERPL-CCNC: 89 IU/L (ref 46–116)
ALT SERPL W P-5'-P-CCNC: 24 U/L (ref 12–78)
ANION GAP SERPL CALCULATED.3IONS-SCNC: 10 MMOL/L (ref 8–16)
ANISOCYTOSIS BLD QL SMEAR: (no result)
APTT PPP: 34 SECONDS (ref 22–32)
AST SERPL W P-5'-P-CCNC: 13 U/L (ref 10–37)
BACTERIA URNS QL MICRO: (no result) /HPF
BASOPHILS # BLD AUTO: (no result) X10'3 (ref 0–0.2)
BASOPHILS NFR BLD AUTO: (no result) % (ref 0–1)
BILIRUB SERPL-MCNC: 1.2 MG/DL (ref 0.1–1)
BUN SERPL-MCNC: 18 MG/DL (ref 7–18)
BUN/CREAT SERPL: 14.6 (ref 5.4–32)
CALCIUM SERPL-MCNC: 9.2 MG/DL (ref 8.5–10.1)
CHLORIDE SERPL-SCNC: 98 MMOL/L (ref 99–107)
CLARITY UR: CLEAR
CO2 SERPL-SCNC: 27.5 MMOL/L (ref 24–32)
COLOR UR: YELLOW
CREAT SERPL-MCNC: 1.23 MG/DL (ref 0.6–1.1)
DEPRECATED SQUAMOUS URNS QL MICRO: (no result) /LPF
EOSINOPHIL # BLD AUTO: (no result) X10'3 (ref 0–0.9)
EOSINOPHIL NFR BLD AUTO: (no result) % (ref 0–6)
ERYTHROCYTE [DISTWIDTH] IN BLOOD BY AUTOMATED COUNT: 15.1 % (ref 11.5–14.5)
GFR SERPL CREATININE-BSD FRML MDRD: 59 ML/MIN
GLUCOSE SERPL-MCNC: 131 MG/DL (ref 70–104)
GLUCOSE UR STRIP-MCNC: NEGATIVE MG/DL
HCT VFR BLD AUTO: 37.6 % (ref 42–52)
HGB BLD-MCNC: 12.4 G/DL (ref 14–17.9)
HGB UR QL STRIP: NEGATIVE
INR PPP: 1 INR
KETONES UR STRIP-MCNC: NEGATIVE MG/DL
LEUKOCYTE ESTERASE UR QL STRIP: NEGATIVE
LYMPHOCYTES # BLD AUTO: (no result) X10'3 (ref 1.1–4.8)
LYMPHOCYTES NFR BLD AUTO: (no result) % (ref 21–51)
LYMPHOCYTES NFR BLD MANUAL: 90 % (ref 21–51)
MCH RBC QN AUTO: 33.1 PG (ref 27–31)
MCHC RBC AUTO-ENTMCNC: 33 % (ref 33–36.5)
MCV RBC AUTO: 100.2 FL (ref 78–98)
MONOCYTES # BLD AUTO: (no result) X10'3 (ref 0–0.9)
MONOCYTES NFR BLD AUTO: (no result) % (ref 2–12)
NEUTROPHILS # BLD AUTO: (no result) X10'3 (ref 1.8–7.7)
NEUTROPHILS NFR BLD AUTO: (no result) % (ref 42–75)
NEUTS SEG NFR BLD MANUAL: 10 % (ref 42–75)
NITRITE UR QL STRIP: NEGATIVE
PH UR STRIP: 7.5 [PH] (ref 4.8–8)
PLATELET # BLD AUTO: 159 X10'3 (ref 140–440)
PLATELET BLD QL SMEAR: NORMAL
PMV BLD AUTO: 7.3 FL (ref 7.4–10.4)
POTASSIUM SERPL-SCNC: 4 MMOL/L (ref 3.5–5.1)
PROT SERPL-MCNC: 7.4 G/DL (ref 6.4–8.2)
PROT UR QL STRIP: 30 MG/DL
PROTHROMBIN TIME: 10.2 SECONDS (ref 9–12)
RBC # BLD AUTO: 3.75 X10'6 (ref 4.7–6.1)
RBC #/AREA URNS HPF: (no result) /HPF (ref 0–2)
RBC MORPH BLD: (no result)
SMUDGE CELLS BLD QL SMEAR: (no result)
SODIUM SERPL-SCNC: 135 MMOL/L (ref 135–145)
SP GR UR STRIP: 1.01 (ref 1–1.03)
STOMATOCYTES BLD QL SMEAR: (no result)
TOTAL CELLS COUNTED FLD: 100
URN COLLECT METHOD CLASS: (no result)
UROBILINOGEN UR STRIP-MCNC: 0.2 E.U/DL (ref 0.2–1)
WBC # BLD AUTO: 137.1 X10'3 (ref 4.5–11)
WBC #/AREA URNS HPF: (no result) /HPF (ref 0–4)

## 2018-03-02 PROCEDURE — 87077 CULTURE AEROBIC IDENTIFY: CPT

## 2018-03-02 PROCEDURE — 84484 ASSAY OF TROPONIN QUANT: CPT

## 2018-03-02 PROCEDURE — 87185 SC STD ENZYME DETCJ PER NZM: CPT

## 2018-03-02 PROCEDURE — 80053 COMPREHEN METABOLIC PANEL: CPT

## 2018-03-02 PROCEDURE — 81001 URINALYSIS AUTO W/SCOPE: CPT

## 2018-03-02 PROCEDURE — 83880 ASSAY OF NATRIURETIC PEPTIDE: CPT

## 2018-03-02 PROCEDURE — 71275 CT ANGIOGRAPHY CHEST: CPT

## 2018-03-02 PROCEDURE — 85610 PROTHROMBIN TIME: CPT

## 2018-03-02 PROCEDURE — 36415 COLL VENOUS BLD VENIPUNCTURE: CPT

## 2018-03-02 PROCEDURE — 85730 THROMBOPLASTIN TIME PARTIAL: CPT

## 2018-03-02 PROCEDURE — 99285 EMERGENCY DEPT VISIT HI MDM: CPT

## 2018-03-02 PROCEDURE — 71046 X-RAY EXAM CHEST 2 VIEWS: CPT

## 2018-03-02 PROCEDURE — 96366 THER/PROPH/DIAG IV INF ADDON: CPT

## 2018-03-02 PROCEDURE — 83605 ASSAY OF LACTIC ACID: CPT

## 2018-03-02 PROCEDURE — 85025 COMPLETE CBC W/AUTO DIFF WBC: CPT

## 2018-03-02 PROCEDURE — 87040 BLOOD CULTURE FOR BACTERIA: CPT

## 2018-03-02 PROCEDURE — 93005 ELECTROCARDIOGRAM TRACING: CPT

## 2018-03-02 PROCEDURE — 87070 CULTURE OTHR SPECIMN AEROBIC: CPT

## 2018-03-02 PROCEDURE — 96375 TX/PRO/DX INJ NEW DRUG ADDON: CPT

## 2018-03-02 PROCEDURE — 84145 PROCALCITONIN (PCT): CPT

## 2018-03-02 PROCEDURE — 96365 THER/PROPH/DIAG IV INF INIT: CPT

## 2018-03-05 ENCOUNTER — HOSPITAL ENCOUNTER (INPATIENT)
Dept: HOSPITAL 94 - ER | Age: 68
LOS: 9 days | Discharge: HOME | DRG: 871 | End: 2018-03-14
Attending: FAMILY MEDICINE | Admitting: FAMILY MEDICINE
Payer: OTHER GOVERNMENT

## 2018-03-05 VITALS — HEIGHT: 74 IN | WEIGHT: 246.92 LBS | BODY MASS INDEX: 31.69 KG/M2

## 2018-03-05 DIAGNOSIS — I47.2: ICD-10-CM

## 2018-03-05 DIAGNOSIS — Z99.81: ICD-10-CM

## 2018-03-05 DIAGNOSIS — I50.9: ICD-10-CM

## 2018-03-05 DIAGNOSIS — I42.0: ICD-10-CM

## 2018-03-05 DIAGNOSIS — G47.00: ICD-10-CM

## 2018-03-05 DIAGNOSIS — R04.2: ICD-10-CM

## 2018-03-05 DIAGNOSIS — E83.42: ICD-10-CM

## 2018-03-05 DIAGNOSIS — I11.0: ICD-10-CM

## 2018-03-05 DIAGNOSIS — J44.0: ICD-10-CM

## 2018-03-05 DIAGNOSIS — Z79.899: ICD-10-CM

## 2018-03-05 DIAGNOSIS — A41.9: Primary | ICD-10-CM

## 2018-03-05 DIAGNOSIS — K21.9: ICD-10-CM

## 2018-03-05 DIAGNOSIS — Z82.49: ICD-10-CM

## 2018-03-05 DIAGNOSIS — Z87.891: ICD-10-CM

## 2018-03-05 DIAGNOSIS — J84.10: ICD-10-CM

## 2018-03-05 DIAGNOSIS — Z80.51: ICD-10-CM

## 2018-03-05 DIAGNOSIS — I45.81: ICD-10-CM

## 2018-03-05 DIAGNOSIS — Z88.8: ICD-10-CM

## 2018-03-05 DIAGNOSIS — J15.1: ICD-10-CM

## 2018-03-05 DIAGNOSIS — N17.9: ICD-10-CM

## 2018-03-05 DIAGNOSIS — F41.1: ICD-10-CM

## 2018-03-05 DIAGNOSIS — C91.10: ICD-10-CM

## 2018-03-05 DIAGNOSIS — Z90.49: ICD-10-CM

## 2018-03-05 DIAGNOSIS — J44.1: ICD-10-CM

## 2018-03-05 DIAGNOSIS — J96.01: ICD-10-CM

## 2018-03-05 LAB
ALBUMIN SERPL BCP-MCNC: 2.8 G/DL (ref 3.4–5)
ALBUMIN/GLOB SERPL: 0.6 {RATIO} (ref 1.1–1.5)
ALP SERPL-CCNC: 82 IU/L (ref 46–116)
ALT SERPL W P-5'-P-CCNC: 10 U/L (ref 12–78)
ANION GAP SERPL CALCULATED.3IONS-SCNC: 8 MMOL/L (ref 8–16)
ANISOCYTOSIS BLD QL SMEAR: (no result)
AST SERPL W P-5'-P-CCNC: 11 U/L (ref 10–37)
BACTERIA URNS QL MICRO: (no result) /HPF
BASOPHILS # BLD AUTO: (no result) X10'3 (ref 0–0.2)
BASOPHILS NFR BLD AUTO: (no result) % (ref 0–1)
BILIRUB SERPL-MCNC: 0.4 MG/DL (ref 0.1–1)
BUN SERPL-MCNC: 19 MG/DL (ref 7–18)
BUN/CREAT SERPL: 13.2 (ref 5.4–32)
CALCIUM SERPL-MCNC: 9 MG/DL (ref 8.5–10.1)
CHLORIDE SERPL-SCNC: 97 MMOL/L (ref 99–107)
CLARITY UR: CLEAR
CO2 SERPL-SCNC: 27.6 MMOL/L (ref 24–32)
COLOR UR: YELLOW
CREAT SERPL-MCNC: 1.44 MG/DL (ref 0.6–1.1)
DEPRECATED SQUAMOUS URNS QL MICRO: (no result) /LPF
EOSINOPHIL # BLD AUTO: (no result) X10'3 (ref 0–0.9)
EOSINOPHIL NFR BLD AUTO: (no result) % (ref 0–6)
ERYTHROCYTE [DISTWIDTH] IN BLOOD BY AUTOMATED COUNT: 15.3 % (ref 11.5–14.5)
GFR SERPL CREATININE-BSD FRML MDRD: 49 ML/MIN
GLUCOSE SERPL-MCNC: 124 MG/DL (ref 70–104)
GLUCOSE UR STRIP-MCNC: NEGATIVE MG/DL
HCT VFR BLD AUTO: 30.8 % (ref 42–52)
HGB BLD-MCNC: 10.5 G/DL (ref 14–17.9)
HGB UR QL STRIP: (no result)
KETONES UR STRIP-MCNC: NEGATIVE MG/DL
LEUKOCYTE ESTERASE UR QL STRIP: NEGATIVE
LYMPHOCYTES # BLD AUTO: (no result) X10'3 (ref 1.1–4.8)
LYMPHOCYTES NFR BLD AUTO: (no result) % (ref 21–51)
LYMPHOCYTES NFR BLD MANUAL: 87 % (ref 21–51)
MAGNESIUM SERPL-MCNC: 1.5 MG/DL (ref 1.5–2.4)
MCH RBC QN AUTO: 33.5 PG (ref 27–31)
MCHC RBC AUTO-ENTMCNC: 34.1 % (ref 33–36.5)
MCV RBC AUTO: 98.1 FL (ref 78–98)
MONOCYTES # BLD AUTO: (no result) X10'3 (ref 0–0.9)
MONOCYTES NFR BLD AUTO: (no result) % (ref 2–12)
MONOCYTES NFR BLD MANUAL: 2 % (ref 2–12)
NEUTROPHILS # BLD AUTO: (no result) X10'3 (ref 1.8–7.7)
NEUTROPHILS NFR BLD AUTO: (no result) % (ref 42–75)
NEUTS BAND # BLD MANUAL: 2 % (ref 0–10)
NEUTS SEG NFR BLD MANUAL: 9 % (ref 42–75)
NITRITE UR QL STRIP: NEGATIVE
PH UR STRIP: 5.5 [PH] (ref 4.8–8)
PLATELET # BLD AUTO: 117 X10'3 (ref 140–440)
PLATELET BLD QL SMEAR: (no result)
PMV BLD AUTO: 7.6 FL (ref 7.4–10.4)
POTASSIUM SERPL-SCNC: 4 MMOL/L (ref 3.5–5.1)
PROT SERPL-MCNC: 7.2 G/DL (ref 6.4–8.2)
PROT UR QL STRIP: (no result) MG/DL
RBC # BLD AUTO: 3.14 X10'6 (ref 4.7–6.1)
RBC #/AREA URNS HPF: (no result) /HPF (ref 0–2)
RBC MORPH BLD: (no result)
SMUDGE CELLS BLD QL SMEAR: (no result)
SODIUM SERPL-SCNC: 133 MMOL/L (ref 135–145)
SP GR UR STRIP: <=1.005 (ref 1–1.03)
SPHEROCYTES BLD QL SMEAR: (no result)
TOTAL CELLS COUNTED FLD: 100
URN COLLECT METHOD CLASS: (no result)
UROBILINOGEN UR STRIP-MCNC: 0.2 E.U/DL (ref 0.2–1)
WBC # BLD AUTO: 59 X10'3 (ref 4.5–11)
WBC #/AREA URNS HPF: (no result) /HPF (ref 0–4)

## 2018-03-05 PROCEDURE — 96365 THER/PROPH/DIAG IV INF INIT: CPT

## 2018-03-05 PROCEDURE — 87040 BLOOD CULTURE FOR BACTERIA: CPT

## 2018-03-05 PROCEDURE — 99285 EMERGENCY DEPT VISIT HI MDM: CPT

## 2018-03-05 PROCEDURE — 94760 N-INVAS EAR/PLS OXIMETRY 1: CPT

## 2018-03-05 PROCEDURE — 83880 ASSAY OF NATRIURETIC PEPTIDE: CPT

## 2018-03-05 PROCEDURE — 93005 ELECTROCARDIOGRAM TRACING: CPT

## 2018-03-05 PROCEDURE — 71045 X-RAY EXAM CHEST 1 VIEW: CPT

## 2018-03-05 PROCEDURE — 94640 AIRWAY INHALATION TREATMENT: CPT

## 2018-03-05 PROCEDURE — 85610 PROTHROMBIN TIME: CPT

## 2018-03-05 PROCEDURE — 71275 CT ANGIOGRAPHY CHEST: CPT

## 2018-03-05 PROCEDURE — 87070 CULTURE OTHR SPECIMN AEROBIC: CPT

## 2018-03-05 PROCEDURE — 96375 TX/PRO/DX INJ NEW DRUG ADDON: CPT

## 2018-03-05 PROCEDURE — 84132 ASSAY OF SERUM POTASSIUM: CPT

## 2018-03-05 PROCEDURE — 85025 COMPLETE CBC W/AUTO DIFF WBC: CPT

## 2018-03-05 PROCEDURE — 80053 COMPREHEN METABOLIC PANEL: CPT

## 2018-03-05 PROCEDURE — 83605 ASSAY OF LACTIC ACID: CPT

## 2018-03-05 PROCEDURE — 81001 URINALYSIS AUTO W/SCOPE: CPT

## 2018-03-05 PROCEDURE — 83735 ASSAY OF MAGNESIUM: CPT

## 2018-03-05 PROCEDURE — 84145 PROCALCITONIN (PCT): CPT

## 2018-03-05 PROCEDURE — 36415 COLL VENOUS BLD VENIPUNCTURE: CPT

## 2018-03-05 RX ADMIN — TAZOBACTAM SODIUM AND PIPERACILLIN SODIUM SCH MLS/HR: 375; 3 INJECTION, SOLUTION INTRAVENOUS at 14:12

## 2018-03-05 RX ADMIN — HYDROCODONE BITARTRATE AND ACETAMINOPHEN PRN TAB: 5; 325 TABLET ORAL at 16:09

## 2018-03-05 RX ADMIN — IPRATROPIUM BROMIDE AND ALBUTEROL SULFATE PRN ML: .5; 3 SOLUTION RESPIRATORY (INHALATION) at 18:56

## 2018-03-05 RX ADMIN — TAZOBACTAM SODIUM AND PIPERACILLIN SODIUM SCH MLS/HR: 375; 3 INJECTION, SOLUTION INTRAVENOUS at 19:56

## 2018-03-05 RX ADMIN — IPRATROPIUM BROMIDE AND ALBUTEROL SULFATE PRN ML: .5; 3 SOLUTION RESPIRATORY (INHALATION) at 23:04

## 2018-03-05 RX ADMIN — SODIUM CHLORIDE SCH MLS/HR: 9 INJECTION INTRAMUSCULAR; INTRAVENOUS; SUBCUTANEOUS at 14:57

## 2018-03-05 RX ADMIN — ONDANSETRON PRN MG: 2 INJECTION, SOLUTION INTRAMUSCULAR; INTRAVENOUS at 20:22

## 2018-03-06 VITALS — DIASTOLIC BLOOD PRESSURE: 78 MMHG | SYSTOLIC BLOOD PRESSURE: 127 MMHG

## 2018-03-06 VITALS — SYSTOLIC BLOOD PRESSURE: 102 MMHG | DIASTOLIC BLOOD PRESSURE: 52 MMHG

## 2018-03-06 VITALS — DIASTOLIC BLOOD PRESSURE: 85 MMHG | SYSTOLIC BLOOD PRESSURE: 119 MMHG

## 2018-03-06 VITALS — SYSTOLIC BLOOD PRESSURE: 130 MMHG | DIASTOLIC BLOOD PRESSURE: 74 MMHG

## 2018-03-06 LAB
ALBUMIN SERPL BCP-MCNC: 2.6 G/DL (ref 3.4–5)
ALBUMIN/GLOB SERPL: 0.6 {RATIO} (ref 1.1–1.5)
ALP SERPL-CCNC: 110 IU/L (ref 46–116)
ALT SERPL W P-5'-P-CCNC: 20 U/L (ref 12–78)
ANION GAP SERPL CALCULATED.3IONS-SCNC: 12 MMOL/L (ref 8–16)
AST SERPL W P-5'-P-CCNC: 14 U/L (ref 10–37)
BASOPHILS # BLD AUTO: 0.4 X10'3 (ref 0–0.2)
BASOPHILS NFR BLD AUTO: 1 % (ref 0–1)
BILIRUB SERPL-MCNC: 0.5 MG/DL (ref 0.1–1)
BUN SERPL-MCNC: 13 MG/DL (ref 7–18)
BUN/CREAT SERPL: 11.8 (ref 5.4–32)
CALCIUM SERPL-MCNC: 8.2 MG/DL (ref 8.5–10.1)
CHLORIDE SERPL-SCNC: 97 MMOL/L (ref 99–107)
CO2 SERPL-SCNC: 25.2 MMOL/L (ref 24–32)
CREAT SERPL-MCNC: 1.1 MG/DL (ref 0.6–1.1)
EOSINOPHIL # BLD AUTO: 0.2 X10'3 (ref 0–0.9)
EOSINOPHIL NFR BLD AUTO: 0.5 % (ref 0–6)
ERYTHROCYTE [DISTWIDTH] IN BLOOD BY AUTOMATED COUNT: 15.5 % (ref 11.5–14.5)
GFR SERPL CREATININE-BSD FRML MDRD: 67 ML/MIN
GLUCOSE SERPL-MCNC: 101 MG/DL (ref 70–104)
HCT VFR BLD AUTO: 29.2 % (ref 42–52)
HGB BLD-MCNC: 9.8 G/DL (ref 14–17.9)
INR PPP: 0.9 INR
LYMPHOCYTES # BLD AUTO: 37.8 X10'3 (ref 1.1–4.8)
LYMPHOCYTES NFR BLD AUTO: 87.4 % (ref 21–51)
LYMPHOCYTES NFR BLD MANUAL: 92 % (ref 21–51)
MCH RBC QN AUTO: 33.3 PG (ref 27–31)
MCHC RBC AUTO-ENTMCNC: 33.6 % (ref 33–36.5)
MCV RBC AUTO: 99.1 FL (ref 78–98)
MONOCYTES # BLD AUTO: 0.4 X10'3 (ref 0–0.9)
MONOCYTES NFR BLD AUTO: 0.9 % (ref 2–12)
MONOCYTES NFR BLD MANUAL: 1 % (ref 2–12)
NEUTROPHILS # BLD AUTO: 4.4 X10'3 (ref 1.8–7.7)
NEUTROPHILS NFR BLD AUTO: 10.2 % (ref 42–75)
NEUTS BAND # BLD MANUAL: 1 % (ref 0–10)
NEUTS SEG NFR BLD MANUAL: 6 % (ref 42–75)
PLATELET # BLD AUTO: 106 X10'3 (ref 140–440)
PLATELET BLD QL SMEAR: (no result)
PMV BLD AUTO: 7.5 FL (ref 7.4–10.4)
POTASSIUM SERPL-SCNC: 3.9 MMOL/L (ref 3.5–5.1)
PROT SERPL-MCNC: 6.8 G/DL (ref 6.4–8.2)
PROTHROMBIN TIME: 9.8 SECONDS (ref 9–12)
RBC # BLD AUTO: 2.94 X10'6 (ref 4.7–6.1)
RBC MORPH BLD: NORMAL
SMUDGE CELLS BLD QL SMEAR: (no result)
SODIUM SERPL-SCNC: 134 MMOL/L (ref 135–145)
TOTAL CELLS COUNTED FLD: 100
WBC # BLD AUTO: 43.2 X10'3 (ref 4.5–11)

## 2018-03-06 RX ADMIN — IPRATROPIUM BROMIDE AND ALBUTEROL SULFATE PRN ML: .5; 3 SOLUTION RESPIRATORY (INHALATION) at 13:49

## 2018-03-06 RX ADMIN — IPRATROPIUM BROMIDE AND ALBUTEROL SULFATE PRN ML: .5; 3 SOLUTION RESPIRATORY (INHALATION) at 02:55

## 2018-03-06 RX ADMIN — TAZOBACTAM SODIUM AND PIPERACILLIN SODIUM SCH MLS/HR: 375; 3 INJECTION, SOLUTION INTRAVENOUS at 20:44

## 2018-03-06 RX ADMIN — ONDANSETRON PRN MG: 2 INJECTION, SOLUTION INTRAMUSCULAR; INTRAVENOUS at 20:58

## 2018-03-06 RX ADMIN — SODIUM CHLORIDE SCH MLS/HR: 9 INJECTION INTRAMUSCULAR; INTRAVENOUS; SUBCUTANEOUS at 14:04

## 2018-03-06 RX ADMIN — TAZOBACTAM SODIUM AND PIPERACILLIN SODIUM SCH MLS/HR: 375; 3 INJECTION, SOLUTION INTRAVENOUS at 09:57

## 2018-03-06 RX ADMIN — TAZOBACTAM SODIUM AND PIPERACILLIN SODIUM SCH MLS/HR: 375; 3 INJECTION, SOLUTION INTRAVENOUS at 01:43

## 2018-03-06 RX ADMIN — IPRATROPIUM BROMIDE AND ALBUTEROL SULFATE PRN ML: .5; 3 SOLUTION RESPIRATORY (INHALATION) at 21:27

## 2018-03-06 RX ADMIN — SODIUM CHLORIDE SCH MLS/HR: 9 INJECTION INTRAMUSCULAR; INTRAVENOUS; SUBCUTANEOUS at 00:13

## 2018-03-06 RX ADMIN — TAZOBACTAM SODIUM AND PIPERACILLIN SODIUM SCH MLS/HR: 375; 3 INJECTION, SOLUTION INTRAVENOUS at 13:55

## 2018-03-06 RX ADMIN — Medication SCH MG: at 09:02

## 2018-03-06 RX ADMIN — CARVEDILOL SCH MG: 6.25 TABLET, FILM COATED ORAL at 20:44

## 2018-03-07 VITALS — SYSTOLIC BLOOD PRESSURE: 103 MMHG | DIASTOLIC BLOOD PRESSURE: 63 MMHG

## 2018-03-07 VITALS — SYSTOLIC BLOOD PRESSURE: 106 MMHG | DIASTOLIC BLOOD PRESSURE: 62 MMHG

## 2018-03-07 VITALS — DIASTOLIC BLOOD PRESSURE: 70 MMHG | SYSTOLIC BLOOD PRESSURE: 108 MMHG

## 2018-03-07 VITALS — SYSTOLIC BLOOD PRESSURE: 117 MMHG | DIASTOLIC BLOOD PRESSURE: 66 MMHG

## 2018-03-07 VITALS — DIASTOLIC BLOOD PRESSURE: 68 MMHG | SYSTOLIC BLOOD PRESSURE: 109 MMHG

## 2018-03-07 LAB
ALBUMIN SERPL BCP-MCNC: 2.6 G/DL (ref 3.4–5)
ALBUMIN/GLOB SERPL: 0.6 {RATIO} (ref 1.1–1.5)
ALP SERPL-CCNC: 106 IU/L (ref 46–116)
ALT SERPL W P-5'-P-CCNC: 21 U/L (ref 12–78)
ANION GAP SERPL CALCULATED.3IONS-SCNC: 7 MMOL/L (ref 8–16)
AST SERPL W P-5'-P-CCNC: 15 U/L (ref 10–37)
BASOPHILS # BLD AUTO: 0.3 X10'3 (ref 0–0.2)
BASOPHILS NFR BLD AUTO: 0.8 % (ref 0–1)
BASOPHILS NFR BLD MANUAL: 1 % (ref 0–1)
BILIRUB SERPL-MCNC: 0.4 MG/DL (ref 0.1–1)
BUN SERPL-MCNC: 10 MG/DL (ref 7–18)
BUN/CREAT SERPL: 7.8 (ref 5.4–32)
CALCIUM SERPL-MCNC: 8.5 MG/DL (ref 8.5–10.1)
CHLORIDE SERPL-SCNC: 98 MMOL/L (ref 99–107)
CO2 SERPL-SCNC: 30 MMOL/L (ref 24–32)
CREAT SERPL-MCNC: 1.29 MG/DL (ref 0.6–1.1)
EOSINOPHIL # BLD AUTO: 0.3 X10'3 (ref 0–0.9)
EOSINOPHIL NFR BLD AUTO: 0.7 % (ref 0–6)
ERYTHROCYTE [DISTWIDTH] IN BLOOD BY AUTOMATED COUNT: 15.3 % (ref 11.5–14.5)
GFR SERPL CREATININE-BSD FRML MDRD: 56 ML/MIN
GLUCOSE SERPL-MCNC: 118 MG/DL (ref 70–104)
HCT VFR BLD AUTO: 30.2 % (ref 42–52)
HGB BLD-MCNC: 10 G/DL (ref 14–17.9)
LYMPHOCYTES # BLD AUTO: 34.1 X10'3 (ref 1.1–4.8)
LYMPHOCYTES NFR BLD AUTO: 87 % (ref 21–51)
LYMPHOCYTES NFR BLD MANUAL: 88 % (ref 21–51)
MAGNESIUM SERPL-MCNC: 1.7 MG/DL (ref 1.5–2.4)
MCH RBC QN AUTO: 32.9 PG (ref 27–31)
MCHC RBC AUTO-ENTMCNC: 33.1 % (ref 33–36.5)
MCV RBC AUTO: 99.3 FL (ref 78–98)
MONOCYTES # BLD AUTO: 0.2 X10'3 (ref 0–0.9)
MONOCYTES NFR BLD AUTO: 0.6 % (ref 2–12)
NEUTROPHILS # BLD AUTO: 4.3 X10'3 (ref 1.8–7.7)
NEUTROPHILS NFR BLD AUTO: 10.9 % (ref 42–75)
NEUTS SEG NFR BLD MANUAL: 11 % (ref 42–75)
PLATELET # BLD AUTO: 112 X10'3 (ref 140–440)
PLATELET BLD QL SMEAR: (no result)
PMV BLD AUTO: 8.1 FL (ref 7.4–10.4)
POTASSIUM SERPL-SCNC: 4.4 MMOL/L (ref 3.5–5.1)
PROT SERPL-MCNC: 6.8 G/DL (ref 6.4–8.2)
RBC # BLD AUTO: 3.04 X10'6 (ref 4.7–6.1)
RBC MORPH BLD: NORMAL
SMUDGE CELLS BLD QL SMEAR: (no result)
SODIUM SERPL-SCNC: 135 MMOL/L (ref 135–145)
TOTAL CELLS COUNTED FLD: 100
WBC # BLD AUTO: 39.2 X10'3 (ref 4.5–11)

## 2018-03-07 RX ADMIN — TAZOBACTAM SODIUM AND PIPERACILLIN SODIUM SCH MLS/HR: 375; 3 INJECTION, SOLUTION INTRAVENOUS at 20:08

## 2018-03-07 RX ADMIN — IPRATROPIUM BROMIDE AND ALBUTEROL SULFATE PRN ML: .5; 3 SOLUTION RESPIRATORY (INHALATION) at 20:35

## 2018-03-07 RX ADMIN — IPRATROPIUM BROMIDE AND ALBUTEROL SULFATE PRN ML: .5; 3 SOLUTION RESPIRATORY (INHALATION) at 08:43

## 2018-03-07 RX ADMIN — SODIUM CHLORIDE SCH MLS/HR: 9 INJECTION INTRAMUSCULAR; INTRAVENOUS; SUBCUTANEOUS at 10:47

## 2018-03-07 RX ADMIN — IPRATROPIUM BROMIDE AND ALBUTEROL SULFATE PRN ML: .5; 3 SOLUTION RESPIRATORY (INHALATION) at 14:42

## 2018-03-07 RX ADMIN — Medication SCH MG: at 08:29

## 2018-03-07 RX ADMIN — Medication SCH MMU: at 20:08

## 2018-03-07 RX ADMIN — TAZOBACTAM SODIUM AND PIPERACILLIN SODIUM SCH MLS/HR: 375; 3 INJECTION, SOLUTION INTRAVENOUS at 14:23

## 2018-03-07 RX ADMIN — TAZOBACTAM SODIUM AND PIPERACILLIN SODIUM SCH MLS/HR: 375; 3 INJECTION, SOLUTION INTRAVENOUS at 01:07

## 2018-03-07 RX ADMIN — TAZOBACTAM SODIUM AND PIPERACILLIN SODIUM SCH MLS/HR: 375; 3 INJECTION, SOLUTION INTRAVENOUS at 09:36

## 2018-03-07 RX ADMIN — SODIUM CHLORIDE SCH MLS/HR: 9 INJECTION INTRAMUSCULAR; INTRAVENOUS; SUBCUTANEOUS at 01:04

## 2018-03-07 RX ADMIN — ONDANSETRON PRN MG: 2 INJECTION, SOLUTION INTRAMUSCULAR; INTRAVENOUS at 15:25

## 2018-03-07 RX ADMIN — CARVEDILOL SCH MG: 6.25 TABLET, FILM COATED ORAL at 20:08

## 2018-03-07 RX ADMIN — CARVEDILOL SCH MG: 6.25 TABLET, FILM COATED ORAL at 08:29

## 2018-03-07 RX ADMIN — IPRATROPIUM BROMIDE AND ALBUTEROL SULFATE PRN ML: .5; 3 SOLUTION RESPIRATORY (INHALATION) at 04:31

## 2018-03-07 RX ADMIN — ONDANSETRON PRN MG: 2 INJECTION, SOLUTION INTRAMUSCULAR; INTRAVENOUS at 20:08

## 2018-03-07 RX ADMIN — SODIUM CHLORIDE SCH MLS/HR: 9 INJECTION INTRAMUSCULAR; INTRAVENOUS; SUBCUTANEOUS at 15:43

## 2018-03-07 RX ADMIN — SODIUM CHLORIDE SCH MLS/HR: 9 INJECTION INTRAMUSCULAR; INTRAVENOUS; SUBCUTANEOUS at 20:15

## 2018-03-08 VITALS — DIASTOLIC BLOOD PRESSURE: 73 MMHG | SYSTOLIC BLOOD PRESSURE: 111 MMHG

## 2018-03-08 VITALS — DIASTOLIC BLOOD PRESSURE: 66 MMHG | SYSTOLIC BLOOD PRESSURE: 119 MMHG

## 2018-03-08 VITALS — DIASTOLIC BLOOD PRESSURE: 81 MMHG | SYSTOLIC BLOOD PRESSURE: 116 MMHG

## 2018-03-08 VITALS — DIASTOLIC BLOOD PRESSURE: 61 MMHG | SYSTOLIC BLOOD PRESSURE: 100 MMHG

## 2018-03-08 VITALS — DIASTOLIC BLOOD PRESSURE: 74 MMHG | SYSTOLIC BLOOD PRESSURE: 106 MMHG

## 2018-03-08 LAB
ALBUMIN SERPL BCP-MCNC: 2.5 G/DL (ref 3.4–5)
ALBUMIN/GLOB SERPL: 0.7 {RATIO} (ref 1.1–1.5)
ALP SERPL-CCNC: 93 IU/L (ref 46–116)
ALT SERPL W P-5'-P-CCNC: 24 U/L (ref 12–78)
ANION GAP SERPL CALCULATED.3IONS-SCNC: 8 MMOL/L (ref 8–16)
AST SERPL W P-5'-P-CCNC: 14 U/L (ref 10–37)
BASOPHILS # BLD AUTO: 0.1 X10'3 (ref 0–0.2)
BASOPHILS NFR BLD AUTO: 0.4 % (ref 0–1)
BILIRUB SERPL-MCNC: 0.3 MG/DL (ref 0.1–1)
BUN SERPL-MCNC: 10 MG/DL (ref 7–18)
BUN/CREAT SERPL: 10 (ref 5.4–32)
CALCIUM SERPL-MCNC: 8.3 MG/DL (ref 8.5–10.1)
CHLORIDE SERPL-SCNC: 99 MMOL/L (ref 99–107)
CO2 SERPL-SCNC: 28.2 MMOL/L (ref 24–32)
CREAT SERPL-MCNC: 1 MG/DL (ref 0.6–1.1)
EOSINOPHIL # BLD AUTO: 0.2 X10'3 (ref 0–0.9)
EOSINOPHIL NFR BLD AUTO: 0.6 % (ref 0–6)
ERYTHROCYTE [DISTWIDTH] IN BLOOD BY AUTOMATED COUNT: 14.9 % (ref 11.5–14.5)
GFR SERPL CREATININE-BSD FRML MDRD: 75 ML/MIN
GLUCOSE SERPL-MCNC: 111 MG/DL (ref 70–104)
HCT VFR BLD AUTO: 28.2 % (ref 42–52)
HGB BLD-MCNC: 9.4 G/DL (ref 14–17.9)
LYMPHOCYTES # BLD AUTO: 31.2 X10'3 (ref 1.1–4.8)
LYMPHOCYTES NFR BLD AUTO: 87.6 % (ref 21–51)
LYMPHOCYTES NFR BLD MANUAL: 90 % (ref 21–51)
MAGNESIUM SERPL-MCNC: 1.6 MG/DL (ref 1.5–2.4)
MCH RBC QN AUTO: 32.9 PG (ref 27–31)
MCHC RBC AUTO-ENTMCNC: 33.2 % (ref 33–36.5)
MCV RBC AUTO: 98.9 FL (ref 78–98)
MONOCYTES # BLD AUTO: 0.2 X10'3 (ref 0–0.9)
MONOCYTES NFR BLD AUTO: 0.6 % (ref 2–12)
NEUTROPHILS # BLD AUTO: 3.8 X10'3 (ref 1.8–7.7)
NEUTROPHILS NFR BLD AUTO: 10.8 % (ref 42–75)
NEUTS SEG NFR BLD MANUAL: 10 % (ref 42–75)
PLATELET # BLD AUTO: 116 X10'3 (ref 140–440)
PLATELET BLD QL SMEAR: (no result)
PMV BLD AUTO: 7.9 FL (ref 7.4–10.4)
POTASSIUM SERPL-SCNC: 4.1 MMOL/L (ref 3.5–5.1)
PROT SERPL-MCNC: 6.3 G/DL (ref 6.4–8.2)
RBC # BLD AUTO: 2.86 X10'6 (ref 4.7–6.1)
RBC MORPH BLD: NORMAL
SODIUM SERPL-SCNC: 135 MMOL/L (ref 135–145)
TOTAL CELLS COUNTED FLD: 100
WBC # BLD AUTO: 35.7 X10'3 (ref 4.5–11)

## 2018-03-08 RX ADMIN — IPRATROPIUM BROMIDE AND ALBUTEROL SULFATE PRN ML: .5; 3 SOLUTION RESPIRATORY (INHALATION) at 00:19

## 2018-03-08 RX ADMIN — IPRATROPIUM BROMIDE AND ALBUTEROL SULFATE PRN ML: .5; 3 SOLUTION RESPIRATORY (INHALATION) at 14:32

## 2018-03-08 RX ADMIN — CARVEDILOL SCH MG: 6.25 TABLET, FILM COATED ORAL at 09:23

## 2018-03-08 RX ADMIN — Medication SCH MMU: at 20:41

## 2018-03-08 RX ADMIN — CARVEDILOL SCH MG: 6.25 TABLET, FILM COATED ORAL at 20:41

## 2018-03-08 RX ADMIN — TAZOBACTAM SODIUM AND PIPERACILLIN SODIUM SCH MLS/HR: 375; 3 INJECTION, SOLUTION INTRAVENOUS at 02:02

## 2018-03-08 RX ADMIN — SODIUM CHLORIDE SCH MLS/HR: 9 INJECTION INTRAMUSCULAR; INTRAVENOUS; SUBCUTANEOUS at 20:42

## 2018-03-08 RX ADMIN — Medication SCH MMU: at 09:24

## 2018-03-08 RX ADMIN — Medication SCH MG: at 09:23

## 2018-03-08 RX ADMIN — TAZOBACTAM SODIUM AND PIPERACILLIN SODIUM SCH MLS/HR: 500; 4 INJECTION, SOLUTION INTRAVENOUS at 23:51

## 2018-03-08 RX ADMIN — TAZOBACTAM SODIUM AND PIPERACILLIN SODIUM SCH MLS/HR: 500; 4 INJECTION, SOLUTION INTRAVENOUS at 17:19

## 2018-03-08 RX ADMIN — ONDANSETRON PRN MG: 2 INJECTION, SOLUTION INTRAMUSCULAR; INTRAVENOUS at 21:33

## 2018-03-08 RX ADMIN — TAZOBACTAM SODIUM AND PIPERACILLIN SODIUM SCH MLS/HR: 375; 3 INJECTION, SOLUTION INTRAVENOUS at 09:23

## 2018-03-08 RX ADMIN — IPRATROPIUM BROMIDE AND ALBUTEROL SULFATE PRN ML: .5; 3 SOLUTION RESPIRATORY (INHALATION) at 10:12

## 2018-03-08 RX ADMIN — SODIUM CHLORIDE SCH MLS/HR: 9 INJECTION INTRAMUSCULAR; INTRAVENOUS; SUBCUTANEOUS at 09:29

## 2018-03-09 VITALS — SYSTOLIC BLOOD PRESSURE: 111 MMHG | DIASTOLIC BLOOD PRESSURE: 77 MMHG

## 2018-03-09 VITALS — SYSTOLIC BLOOD PRESSURE: 118 MMHG | DIASTOLIC BLOOD PRESSURE: 73 MMHG

## 2018-03-09 VITALS — DIASTOLIC BLOOD PRESSURE: 65 MMHG | SYSTOLIC BLOOD PRESSURE: 112 MMHG

## 2018-03-09 VITALS — DIASTOLIC BLOOD PRESSURE: 74 MMHG | SYSTOLIC BLOOD PRESSURE: 119 MMHG

## 2018-03-09 LAB
ALBUMIN SERPL BCP-MCNC: 2.6 G/DL (ref 3.4–5)
ALBUMIN/GLOB SERPL: 0.7 {RATIO} (ref 1.1–1.5)
ALP SERPL-CCNC: 119 IU/L (ref 46–116)
ALT SERPL W P-5'-P-CCNC: 38 U/L (ref 12–78)
ANION GAP SERPL CALCULATED.3IONS-SCNC: 6 MMOL/L (ref 8–16)
AST SERPL W P-5'-P-CCNC: 28 U/L (ref 10–37)
BASOPHILS # BLD AUTO: (no result) X10'3 (ref 0–0.2)
BASOPHILS NFR BLD AUTO: (no result) % (ref 0–1)
BILIRUB SERPL-MCNC: 0.3 MG/DL (ref 0.1–1)
BUN SERPL-MCNC: 8 MG/DL (ref 7–18)
BUN/CREAT SERPL: 7.3 (ref 5.4–32)
CALCIUM SERPL-MCNC: 8.4 MG/DL (ref 8.5–10.1)
CHLORIDE SERPL-SCNC: 101 MMOL/L (ref 99–107)
CO2 SERPL-SCNC: 31.7 MMOL/L (ref 24–32)
CREAT SERPL-MCNC: 1.1 MG/DL (ref 0.6–1.1)
EOSINOPHIL # BLD AUTO: (no result) X10'3 (ref 0–0.9)
EOSINOPHIL NFR BLD AUTO: (no result) % (ref 0–6)
EOSINOPHIL NFR BLD MANUAL: 2 % (ref 0–6)
ERYTHROCYTE [DISTWIDTH] IN BLOOD BY AUTOMATED COUNT: 15.4 % (ref 11.5–14.5)
GFR SERPL CREATININE-BSD FRML MDRD: 67 ML/MIN
GLUCOSE SERPL-MCNC: 114 MG/DL (ref 70–104)
HCT VFR BLD AUTO: 29.4 % (ref 42–52)
HGB BLD-MCNC: 9.7 G/DL (ref 14–17.9)
LYMPHOCYTES # BLD AUTO: (no result) X10'3 (ref 1.1–4.8)
LYMPHOCYTES NFR BLD AUTO: (no result) % (ref 21–51)
LYMPHOCYTES NFR BLD MANUAL: 94 % (ref 21–51)
MACROCYTES BLD QL SMEAR: (no result)
MAGNESIUM SERPL-MCNC: 1.6 MG/DL (ref 1.5–2.4)
MCH RBC QN AUTO: 32.6 PG (ref 27–31)
MCHC RBC AUTO-ENTMCNC: 33 % (ref 33–36.5)
MCV RBC AUTO: 98.8 FL (ref 78–98)
MONOCYTES # BLD AUTO: (no result) X10'3 (ref 0–0.9)
MONOCYTES NFR BLD AUTO: (no result) % (ref 2–12)
NEUTROPHILS # BLD AUTO: (no result) X10'3 (ref 1.8–7.7)
NEUTROPHILS NFR BLD AUTO: (no result) % (ref 42–75)
NEUTS SEG NFR BLD MANUAL: 4 % (ref 42–75)
PLATELET # BLD AUTO: 160 X10'3 (ref 140–440)
PLATELET BLD QL SMEAR: NORMAL
PMV BLD AUTO: 8.3 FL (ref 7.4–10.4)
POTASSIUM SERPL-SCNC: 4.6 MMOL/L (ref 3.5–5.1)
PROT SERPL-MCNC: 6.4 G/DL (ref 6.4–8.2)
RBC # BLD AUTO: 2.97 X10'6 (ref 4.7–6.1)
RBC MORPH BLD: (no result)
SODIUM SERPL-SCNC: 139 MMOL/L (ref 135–145)
TOTAL CELLS COUNTED FLD: 100
WBC # BLD AUTO: 45.5 X10'3 (ref 4.5–11)

## 2018-03-09 RX ADMIN — SUCRALFATE SCH GM: 1 SUSPENSION ORAL at 17:08

## 2018-03-09 RX ADMIN — Medication SCH MMU: at 07:18

## 2018-03-09 RX ADMIN — SODIUM CHLORIDE SCH MLS/HR: 9 INJECTION INTRAMUSCULAR; INTRAVENOUS; SUBCUTANEOUS at 07:18

## 2018-03-09 RX ADMIN — HYDROCODONE BITARTRATE AND ACETAMINOPHEN PRN TAB: 10; 325 TABLET ORAL at 21:00

## 2018-03-09 RX ADMIN — HYDROCODONE BITARTRATE AND ACETAMINOPHEN PRN TAB: 5; 325 TABLET ORAL at 12:41

## 2018-03-09 RX ADMIN — IPRATROPIUM BROMIDE AND ALBUTEROL SULFATE PRN ML: .5; 3 SOLUTION RESPIRATORY (INHALATION) at 11:41

## 2018-03-09 RX ADMIN — HYDROCODONE BITARTRATE AND ACETAMINOPHEN PRN TAB: 5; 325 TABLET ORAL at 17:08

## 2018-03-09 RX ADMIN — TAZOBACTAM SODIUM AND PIPERACILLIN SODIUM SCH MLS/HR: 500; 4 INJECTION, SOLUTION INTRAVENOUS at 23:08

## 2018-03-09 RX ADMIN — TAZOBACTAM SODIUM AND PIPERACILLIN SODIUM SCH MLS/HR: 500; 4 INJECTION, SOLUTION INTRAVENOUS at 10:10

## 2018-03-09 RX ADMIN — IPRATROPIUM BROMIDE AND ALBUTEROL SULFATE PRN ML: .5; 3 SOLUTION RESPIRATORY (INHALATION) at 21:19

## 2018-03-09 RX ADMIN — SODIUM CHLORIDE SCH MLS/HR: 9 INJECTION INTRAMUSCULAR; INTRAVENOUS; SUBCUTANEOUS at 20:16

## 2018-03-09 RX ADMIN — CARVEDILOL SCH MG: 6.25 TABLET, FILM COATED ORAL at 07:18

## 2018-03-09 RX ADMIN — TAZOBACTAM SODIUM AND PIPERACILLIN SODIUM SCH MLS/HR: 500; 4 INJECTION, SOLUTION INTRAVENOUS at 17:12

## 2018-03-09 RX ADMIN — Medication SCH MG: at 07:18

## 2018-03-09 RX ADMIN — CARVEDILOL SCH MG: 6.25 TABLET, FILM COATED ORAL at 20:58

## 2018-03-09 RX ADMIN — SUCRALFATE SCH GM: 1 SUSPENSION ORAL at 20:59

## 2018-03-09 RX ADMIN — Medication SCH MMU: at 20:58

## 2018-03-10 VITALS — DIASTOLIC BLOOD PRESSURE: 71 MMHG | SYSTOLIC BLOOD PRESSURE: 101 MMHG

## 2018-03-10 VITALS — DIASTOLIC BLOOD PRESSURE: 71 MMHG | SYSTOLIC BLOOD PRESSURE: 112 MMHG

## 2018-03-10 VITALS — DIASTOLIC BLOOD PRESSURE: 84 MMHG | SYSTOLIC BLOOD PRESSURE: 148 MMHG

## 2018-03-10 VITALS — DIASTOLIC BLOOD PRESSURE: 77 MMHG | SYSTOLIC BLOOD PRESSURE: 120 MMHG

## 2018-03-10 VITALS — DIASTOLIC BLOOD PRESSURE: 80 MMHG | SYSTOLIC BLOOD PRESSURE: 132 MMHG

## 2018-03-10 LAB
ALBUMIN SERPL BCP-MCNC: 2.4 G/DL (ref 3.4–5)
ALBUMIN/GLOB SERPL: 0.7 {RATIO} (ref 1.1–1.5)
ALP SERPL-CCNC: 93 IU/L (ref 46–116)
ALT SERPL W P-5'-P-CCNC: 27 U/L (ref 12–78)
ANION GAP SERPL CALCULATED.3IONS-SCNC: 7 MMOL/L (ref 8–16)
ANISOCYTOSIS BLD QL SMEAR: (no result)
AST SERPL W P-5'-P-CCNC: 18 U/L (ref 10–37)
BASOPHILS # BLD AUTO: (no result) X10'3 (ref 0–0.2)
BASOPHILS NFR BLD AUTO: (no result) % (ref 0–1)
BILIRUB SERPL-MCNC: 0.3 MG/DL (ref 0.1–1)
BUN SERPL-MCNC: 8 MG/DL (ref 7–18)
BUN/CREAT SERPL: 7.3 (ref 5.4–32)
CALCIUM SERPL-MCNC: 8.1 MG/DL (ref 8.5–10.1)
CHLORIDE SERPL-SCNC: 100 MMOL/L (ref 99–107)
CO2 SERPL-SCNC: 31.5 MMOL/L (ref 24–32)
CREAT SERPL-MCNC: 1.09 MG/DL (ref 0.6–1.1)
EOSINOPHIL # BLD AUTO: (no result) X10'3 (ref 0–0.9)
EOSINOPHIL NFR BLD AUTO: (no result) % (ref 0–6)
ERYTHROCYTE [DISTWIDTH] IN BLOOD BY AUTOMATED COUNT: 15.3 % (ref 11.5–14.5)
GFR SERPL CREATININE-BSD FRML MDRD: 68 ML/MIN
GLUCOSE SERPL-MCNC: 105 MG/DL (ref 70–104)
HCT VFR BLD AUTO: 27.9 % (ref 42–52)
HGB BLD-MCNC: 9.2 G/DL (ref 14–17.9)
LYMPHOCYTES # BLD AUTO: (no result) X10'3 (ref 1.1–4.8)
LYMPHOCYTES NFR BLD AUTO: (no result) % (ref 21–51)
LYMPHOCYTES NFR BLD MANUAL: 95 % (ref 21–51)
MACROCYTES BLD QL SMEAR: (no result)
MAGNESIUM SERPL-MCNC: 1.5 MG/DL (ref 1.5–2.4)
MCH RBC QN AUTO: 32.4 PG (ref 27–31)
MCHC RBC AUTO-ENTMCNC: 33 % (ref 33–36.5)
MCV RBC AUTO: 98 FL (ref 78–98)
MONOCYTES # BLD AUTO: (no result) X10'3 (ref 0–0.9)
MONOCYTES NFR BLD AUTO: (no result) % (ref 2–12)
NEUTROPHILS # BLD AUTO: (no result) X10'3 (ref 1.8–7.7)
NEUTROPHILS NFR BLD AUTO: (no result) % (ref 42–75)
NEUTS SEG NFR BLD MANUAL: 5 % (ref 42–75)
PLATELET # BLD AUTO: 149 X10'3 (ref 140–440)
PLATELET BLD QL SMEAR: NORMAL
PMV BLD AUTO: 7.9 FL (ref 7.4–10.4)
POTASSIUM SERPL-SCNC: 3.5 MMOL/L (ref 3.5–5.1)
PROT SERPL-MCNC: 6 G/DL (ref 6.4–8.2)
RBC # BLD AUTO: 2.84 X10'6 (ref 4.7–6.1)
RBC MORPH BLD: (no result)
SODIUM SERPL-SCNC: 138 MMOL/L (ref 135–145)
TOTAL CELLS COUNTED FLD: 100
WBC # BLD AUTO: 45.8 X10'3 (ref 4.5–11)

## 2018-03-10 PROCEDURE — BF261ZZ COMPUTERIZED TOMOGRAPHY (CT SCAN) OF LIVER AND SPLEEN USING LOW OSMOLAR CONTRAST: ICD-10-PCS

## 2018-03-10 PROCEDURE — B3201ZZ COMPUTERIZED TOMOGRAPHY (CT SCAN) OF THORACIC AORTA USING LOW OSMOLAR CONTRAST: ICD-10-PCS

## 2018-03-10 PROCEDURE — B32T1ZZ COMPUTERIZED TOMOGRAPHY (CT SCAN) OF LEFT PULMONARY ARTERY USING LOW OSMOLAR CONTRAST: ICD-10-PCS

## 2018-03-10 PROCEDURE — B32S1ZZ COMPUTERIZED TOMOGRAPHY (CT SCAN) OF RIGHT PULMONARY ARTERY USING LOW OSMOLAR CONTRAST: ICD-10-PCS

## 2018-03-10 RX ADMIN — SUCRALFATE SCH GM: 1 SUSPENSION ORAL at 06:00

## 2018-03-10 RX ADMIN — CARVEDILOL SCH MG: 6.25 TABLET, FILM COATED ORAL at 07:42

## 2018-03-10 RX ADMIN — SUCRALFATE SCH GM: 1 SUSPENSION ORAL at 15:11

## 2018-03-10 RX ADMIN — SODIUM CHLORIDE SCH MLS/HR: 9 INJECTION INTRAMUSCULAR; INTRAVENOUS; SUBCUTANEOUS at 15:11

## 2018-03-10 RX ADMIN — SODIUM CHLORIDE SCH MLS/HR: 9 INJECTION INTRAMUSCULAR; INTRAVENOUS; SUBCUTANEOUS at 06:06

## 2018-03-10 RX ADMIN — TAZOBACTAM SODIUM AND PIPERACILLIN SODIUM SCH MLS/HR: 500; 4 INJECTION, SOLUTION INTRAVENOUS at 07:31

## 2018-03-10 RX ADMIN — ONDANSETRON PRN MG: 2 INJECTION, SOLUTION INTRAMUSCULAR; INTRAVENOUS at 06:01

## 2018-03-10 RX ADMIN — Medication SCH MMU: at 07:31

## 2018-03-10 RX ADMIN — TAZOBACTAM SODIUM AND PIPERACILLIN SODIUM SCH MLS/HR: 500; 4 INJECTION, SOLUTION INTRAVENOUS at 15:12

## 2018-03-10 RX ADMIN — SUCRALFATE SCH GM: 1 SUSPENSION ORAL at 21:19

## 2018-03-10 RX ADMIN — HYDROCODONE BITARTRATE AND ACETAMINOPHEN PRN TAB: 10; 325 TABLET ORAL at 11:40

## 2018-03-10 RX ADMIN — HYDROCODONE BITARTRATE AND ACETAMINOPHEN PRN TAB: 10; 325 TABLET ORAL at 17:44

## 2018-03-10 RX ADMIN — Medication SCH MMU: at 19:31

## 2018-03-10 RX ADMIN — Medication SCH MG: at 07:31

## 2018-03-10 RX ADMIN — SODIUM CHLORIDE SCH MLS/HR: 9 INJECTION INTRAMUSCULAR; INTRAVENOUS; SUBCUTANEOUS at 03:43

## 2018-03-10 RX ADMIN — IPRATROPIUM BROMIDE AND ALBUTEROL SULFATE PRN ML: .5; 3 SOLUTION RESPIRATORY (INHALATION) at 05:02

## 2018-03-10 RX ADMIN — HYDROCODONE BITARTRATE AND ACETAMINOPHEN PRN TAB: 10; 325 TABLET ORAL at 06:01

## 2018-03-10 RX ADMIN — SUCRALFATE SCH GM: 1 SUSPENSION ORAL at 11:37

## 2018-03-10 RX ADMIN — CARVEDILOL SCH MG: 6.25 TABLET, FILM COATED ORAL at 19:31

## 2018-03-10 RX ADMIN — IPRATROPIUM BROMIDE AND ALBUTEROL SULFATE PRN ML: .5; 3 SOLUTION RESPIRATORY (INHALATION) at 12:46

## 2018-03-10 RX ADMIN — IPRATROPIUM BROMIDE AND ALBUTEROL SULFATE PRN ML: .5; 3 SOLUTION RESPIRATORY (INHALATION) at 16:52

## 2018-03-11 VITALS — SYSTOLIC BLOOD PRESSURE: 110 MMHG | DIASTOLIC BLOOD PRESSURE: 73 MMHG

## 2018-03-11 VITALS — SYSTOLIC BLOOD PRESSURE: 103 MMHG | DIASTOLIC BLOOD PRESSURE: 65 MMHG

## 2018-03-11 VITALS — SYSTOLIC BLOOD PRESSURE: 106 MMHG | DIASTOLIC BLOOD PRESSURE: 64 MMHG

## 2018-03-11 VITALS — DIASTOLIC BLOOD PRESSURE: 68 MMHG | SYSTOLIC BLOOD PRESSURE: 111 MMHG

## 2018-03-11 VITALS — SYSTOLIC BLOOD PRESSURE: 110 MMHG | DIASTOLIC BLOOD PRESSURE: 66 MMHG

## 2018-03-11 LAB
ALBUMIN SERPL BCP-MCNC: 2.7 G/DL (ref 3.4–5)
ALBUMIN/GLOB SERPL: 0.8 {RATIO} (ref 1.1–1.5)
ALP SERPL-CCNC: 100 IU/L (ref 46–116)
ALT SERPL W P-5'-P-CCNC: 24 U/L (ref 12–78)
ANION GAP SERPL CALCULATED.3IONS-SCNC: 2 MMOL/L (ref 8–16)
ANISOCYTOSIS BLD QL SMEAR: (no result)
AST SERPL W P-5'-P-CCNC: 15 U/L (ref 10–37)
BASOPHILS # BLD AUTO: (no result) X10'3 (ref 0–0.2)
BASOPHILS NFR BLD AUTO: (no result) % (ref 0–1)
BILIRUB SERPL-MCNC: 0.3 MG/DL (ref 0.1–1)
BUN SERPL-MCNC: 7 MG/DL (ref 7–18)
BUN/CREAT SERPL: 6.3 (ref 5.4–32)
CALCIUM SERPL-MCNC: 8.5 MG/DL (ref 8.5–10.1)
CHLORIDE SERPL-SCNC: 98 MMOL/L (ref 99–107)
CO2 SERPL-SCNC: 39.1 MMOL/L (ref 24–32)
CREAT SERPL-MCNC: 1.11 MG/DL (ref 0.6–1.1)
EOSINOPHIL # BLD AUTO: (no result) X10'3 (ref 0–0.9)
EOSINOPHIL NFR BLD AUTO: (no result) % (ref 0–6)
ERYTHROCYTE [DISTWIDTH] IN BLOOD BY AUTOMATED COUNT: 15.8 % (ref 11.5–14.5)
GFR SERPL CREATININE-BSD FRML MDRD: 66 ML/MIN
GLUCOSE SERPL-MCNC: 106 MG/DL (ref 70–104)
HCT VFR BLD AUTO: 30.3 % (ref 42–52)
HGB BLD-MCNC: 9.9 G/DL (ref 14–17.9)
LYMPHOCYTES # BLD AUTO: (no result) X10'3 (ref 1.1–4.8)
LYMPHOCYTES NFR BLD AUTO: (no result) % (ref 21–51)
LYMPHOCYTES NFR BLD MANUAL: 90 % (ref 21–51)
MACROCYTES BLD QL SMEAR: (no result)
MAGNESIUM SERPL-MCNC: 1.9 MG/DL (ref 1.5–2.4)
MCH RBC QN AUTO: 32.2 PG (ref 27–31)
MCHC RBC AUTO-ENTMCNC: 32.6 % (ref 33–36.5)
MCV RBC AUTO: 99 FL (ref 78–98)
MONOCYTES # BLD AUTO: (no result) X10'3 (ref 0–0.9)
MONOCYTES NFR BLD AUTO: (no result) % (ref 2–12)
MONOCYTES NFR BLD MANUAL: 1 % (ref 2–12)
NEUTROPHILS # BLD AUTO: (no result) X10'3 (ref 1.8–7.7)
NEUTROPHILS NFR BLD AUTO: (no result) % (ref 42–75)
NEUTS SEG NFR BLD MANUAL: 9 % (ref 42–75)
PLATELET # BLD AUTO: 207 X10'3 (ref 140–440)
PLATELET BLD QL SMEAR: NORMAL
PMV BLD AUTO: 7.3 FL (ref 7.4–10.4)
POLYCHROMASIA BLD QL SMEAR: (no result)
POTASSIUM SERPL-SCNC: 3.5 MMOL/L (ref 3.5–5.1)
POTASSIUM SERPL-SCNC: 3.8 MMOL/L (ref 3.5–5.1)
PROT SERPL-MCNC: 6.3 G/DL (ref 6.4–8.2)
RBC # BLD AUTO: 3.06 X10'6 (ref 4.7–6.1)
RBC MORPH BLD: (no result)
SODIUM SERPL-SCNC: 139 MMOL/L (ref 135–145)
STOMATOCYTES BLD QL SMEAR: (no result)
TOTAL CELLS COUNTED FLD: 100
WBC # BLD AUTO: 51.1 X10'3 (ref 4.5–11)

## 2018-03-11 RX ADMIN — CARVEDILOL SCH MG: 6.25 TABLET, FILM COATED ORAL at 19:47

## 2018-03-11 RX ADMIN — IPRATROPIUM BROMIDE AND ALBUTEROL SULFATE PRN ML: .5; 3 SOLUTION RESPIRATORY (INHALATION) at 21:10

## 2018-03-11 RX ADMIN — HYDROCODONE BITARTRATE AND ACETAMINOPHEN PRN TAB: 10; 325 TABLET ORAL at 05:27

## 2018-03-11 RX ADMIN — TAZOBACTAM SODIUM AND PIPERACILLIN SODIUM SCH MLS/HR: 500; 4 INJECTION, SOLUTION INTRAVENOUS at 23:37

## 2018-03-11 RX ADMIN — TAZOBACTAM SODIUM AND PIPERACILLIN SODIUM SCH MLS/HR: 500; 4 INJECTION, SOLUTION INTRAVENOUS at 01:17

## 2018-03-11 RX ADMIN — SUCRALFATE SCH GM: 1 SUSPENSION ORAL at 19:46

## 2018-03-11 RX ADMIN — CARVEDILOL SCH MG: 6.25 TABLET, FILM COATED ORAL at 08:15

## 2018-03-11 RX ADMIN — TAZOBACTAM SODIUM AND PIPERACILLIN SODIUM SCH MLS/HR: 500; 4 INJECTION, SOLUTION INTRAVENOUS at 15:04

## 2018-03-11 RX ADMIN — Medication SCH MG: at 08:15

## 2018-03-11 RX ADMIN — Medication SCH MMU: at 08:15

## 2018-03-11 RX ADMIN — SUCRALFATE SCH GM: 1 SUSPENSION ORAL at 08:15

## 2018-03-11 RX ADMIN — IPRATROPIUM BROMIDE AND ALBUTEROL SULFATE PRN ML: .5; 3 SOLUTION RESPIRATORY (INHALATION) at 15:50

## 2018-03-11 RX ADMIN — Medication SCH MMU: at 19:46

## 2018-03-11 RX ADMIN — SUCRALFATE SCH GM: 1 SUSPENSION ORAL at 11:42

## 2018-03-11 RX ADMIN — TAZOBACTAM SODIUM AND PIPERACILLIN SODIUM SCH MLS/HR: 500; 4 INJECTION, SOLUTION INTRAVENOUS at 08:15

## 2018-03-11 RX ADMIN — SUCRALFATE SCH GM: 1 SUSPENSION ORAL at 15:04

## 2018-03-11 RX ADMIN — HYDROCODONE BITARTRATE AND ACETAMINOPHEN PRN TAB: 10; 325 TABLET ORAL at 11:44

## 2018-03-12 VITALS — SYSTOLIC BLOOD PRESSURE: 102 MMHG | DIASTOLIC BLOOD PRESSURE: 65 MMHG

## 2018-03-12 VITALS — SYSTOLIC BLOOD PRESSURE: 118 MMHG | DIASTOLIC BLOOD PRESSURE: 72 MMHG

## 2018-03-12 VITALS — DIASTOLIC BLOOD PRESSURE: 65 MMHG | SYSTOLIC BLOOD PRESSURE: 102 MMHG

## 2018-03-12 VITALS — SYSTOLIC BLOOD PRESSURE: 107 MMHG | DIASTOLIC BLOOD PRESSURE: 61 MMHG

## 2018-03-12 RX ADMIN — CARVEDILOL SCH MG: 6.25 TABLET, FILM COATED ORAL at 09:33

## 2018-03-12 RX ADMIN — SUCRALFATE SCH GM: 1 SUSPENSION ORAL at 11:58

## 2018-03-12 RX ADMIN — SUCRALFATE SCH GM: 1 SUSPENSION ORAL at 22:20

## 2018-03-12 RX ADMIN — CIPROFLOXACIN SCH MG: 250 TABLET, FILM COATED ORAL at 22:20

## 2018-03-12 RX ADMIN — HYDROCODONE BITARTRATE AND ACETAMINOPHEN PRN TAB: 10; 325 TABLET ORAL at 09:38

## 2018-03-12 RX ADMIN — IPRATROPIUM BROMIDE AND ALBUTEROL SULFATE PRN ML: .5; 3 SOLUTION RESPIRATORY (INHALATION) at 17:39

## 2018-03-12 RX ADMIN — IPRATROPIUM BROMIDE AND ALBUTEROL SULFATE PRN ML: .5; 3 SOLUTION RESPIRATORY (INHALATION) at 11:28

## 2018-03-12 RX ADMIN — Medication SCH MMU: at 09:33

## 2018-03-12 RX ADMIN — CARVEDILOL SCH MG: 6.25 TABLET, FILM COATED ORAL at 22:29

## 2018-03-12 RX ADMIN — TAZOBACTAM SODIUM AND PIPERACILLIN SODIUM SCH MLS/HR: 500; 4 INJECTION, SOLUTION INTRAVENOUS at 16:34

## 2018-03-12 RX ADMIN — Medication SCH MG: at 09:34

## 2018-03-12 RX ADMIN — SUCRALFATE SCH GM: 1 SUSPENSION ORAL at 07:00

## 2018-03-12 RX ADMIN — SUCRALFATE SCH GM: 1 SUSPENSION ORAL at 16:33

## 2018-03-12 RX ADMIN — TAZOBACTAM SODIUM AND PIPERACILLIN SODIUM SCH MLS/HR: 500; 4 INJECTION, SOLUTION INTRAVENOUS at 23:55

## 2018-03-12 RX ADMIN — HYDROCODONE BITARTRATE AND ACETAMINOPHEN PRN TAB: 10; 325 TABLET ORAL at 22:22

## 2018-03-12 RX ADMIN — TAZOBACTAM SODIUM AND PIPERACILLIN SODIUM SCH MLS/HR: 500; 4 INJECTION, SOLUTION INTRAVENOUS at 09:23

## 2018-03-12 RX ADMIN — IPRATROPIUM BROMIDE AND ALBUTEROL SULFATE PRN ML: .5; 3 SOLUTION RESPIRATORY (INHALATION) at 05:54

## 2018-03-12 RX ADMIN — CIPROFLOXACIN SCH MG: 250 TABLET, FILM COATED ORAL at 09:38

## 2018-03-12 RX ADMIN — Medication SCH MMU: at 22:20

## 2018-03-13 VITALS — DIASTOLIC BLOOD PRESSURE: 70 MMHG | SYSTOLIC BLOOD PRESSURE: 119 MMHG

## 2018-03-13 VITALS — SYSTOLIC BLOOD PRESSURE: 104 MMHG | DIASTOLIC BLOOD PRESSURE: 64 MMHG

## 2018-03-13 VITALS — SYSTOLIC BLOOD PRESSURE: 112 MMHG | DIASTOLIC BLOOD PRESSURE: 63 MMHG

## 2018-03-13 VITALS — SYSTOLIC BLOOD PRESSURE: 113 MMHG | DIASTOLIC BLOOD PRESSURE: 62 MMHG

## 2018-03-13 RX ADMIN — SUCRALFATE SCH GM: 1 SUSPENSION ORAL at 16:28

## 2018-03-13 RX ADMIN — IPRATROPIUM BROMIDE AND ALBUTEROL SULFATE PRN ML: .5; 3 SOLUTION RESPIRATORY (INHALATION) at 09:34

## 2018-03-13 RX ADMIN — CARVEDILOL SCH MG: 6.25 TABLET, FILM COATED ORAL at 07:51

## 2018-03-13 RX ADMIN — SUCRALFATE SCH GM: 1 SUSPENSION ORAL at 21:21

## 2018-03-13 RX ADMIN — CIPROFLOXACIN SCH MG: 250 TABLET, FILM COATED ORAL at 08:00

## 2018-03-13 RX ADMIN — CIPROFLOXACIN SCH MG: 250 TABLET, FILM COATED ORAL at 21:20

## 2018-03-13 RX ADMIN — SUCRALFATE SCH GM: 1 SUSPENSION ORAL at 07:51

## 2018-03-13 RX ADMIN — IPRATROPIUM BROMIDE AND ALBUTEROL SULFATE PRN ML: .5; 3 SOLUTION RESPIRATORY (INHALATION) at 15:00

## 2018-03-13 RX ADMIN — IPRATROPIUM BROMIDE AND ALBUTEROL SULFATE PRN ML: .5; 3 SOLUTION RESPIRATORY (INHALATION) at 20:29

## 2018-03-13 RX ADMIN — CARVEDILOL SCH MG: 6.25 TABLET, FILM COATED ORAL at 21:20

## 2018-03-13 RX ADMIN — Medication SCH MMU: at 07:51

## 2018-03-13 RX ADMIN — Medication SCH MG: at 07:51

## 2018-03-13 RX ADMIN — HYDROCODONE BITARTRATE AND ACETAMINOPHEN PRN TAB: 10; 325 TABLET ORAL at 07:52

## 2018-03-13 RX ADMIN — HYDROCODONE BITARTRATE AND ACETAMINOPHEN PRN TAB: 10; 325 TABLET ORAL at 21:32

## 2018-03-13 RX ADMIN — SUCRALFATE SCH GM: 1 SUSPENSION ORAL at 11:54

## 2018-03-13 RX ADMIN — TAZOBACTAM SODIUM AND PIPERACILLIN SODIUM SCH MLS/HR: 500; 4 INJECTION, SOLUTION INTRAVENOUS at 07:52

## 2018-03-13 RX ADMIN — Medication SCH MMU: at 21:20

## 2018-03-13 RX ADMIN — TAZOBACTAM SODIUM AND PIPERACILLIN SODIUM SCH MLS/HR: 500; 4 INJECTION, SOLUTION INTRAVENOUS at 16:28

## 2018-03-14 VITALS — DIASTOLIC BLOOD PRESSURE: 64 MMHG | SYSTOLIC BLOOD PRESSURE: 107 MMHG

## 2018-03-14 VITALS — SYSTOLIC BLOOD PRESSURE: 115 MMHG | DIASTOLIC BLOOD PRESSURE: 74 MMHG

## 2018-03-14 VITALS — DIASTOLIC BLOOD PRESSURE: 65 MMHG | SYSTOLIC BLOOD PRESSURE: 107 MMHG

## 2018-03-14 RX ADMIN — CARVEDILOL SCH MG: 6.25 TABLET, FILM COATED ORAL at 10:42

## 2018-03-14 RX ADMIN — Medication SCH MMU: at 19:26

## 2018-03-14 RX ADMIN — CIPROFLOXACIN SCH MG: 250 TABLET, FILM COATED ORAL at 10:40

## 2018-03-14 RX ADMIN — HYDROCODONE BITARTRATE AND ACETAMINOPHEN PRN TAB: 10; 325 TABLET ORAL at 16:47

## 2018-03-14 RX ADMIN — CIPROFLOXACIN SCH MG: 250 TABLET, FILM COATED ORAL at 19:26

## 2018-03-14 RX ADMIN — SUCRALFATE SCH GM: 1 SUSPENSION ORAL at 12:18

## 2018-03-14 RX ADMIN — IPRATROPIUM BROMIDE AND ALBUTEROL SULFATE PRN ML: .5; 3 SOLUTION RESPIRATORY (INHALATION) at 10:48

## 2018-03-14 RX ADMIN — Medication SCH MG: at 10:41

## 2018-03-14 RX ADMIN — TAZOBACTAM SODIUM AND PIPERACILLIN SODIUM SCH MLS/HR: 500; 4 INJECTION, SOLUTION INTRAVENOUS at 00:04

## 2018-03-14 RX ADMIN — SUCRALFATE SCH GM: 1 SUSPENSION ORAL at 15:35

## 2018-03-14 RX ADMIN — SUCRALFATE SCH GM: 1 SUSPENSION ORAL at 07:00

## 2018-03-14 RX ADMIN — Medication SCH MMU: at 10:41

## 2018-03-14 RX ADMIN — IPRATROPIUM BROMIDE AND ALBUTEROL SULFATE PRN ML: .5; 3 SOLUTION RESPIRATORY (INHALATION) at 07:22

## 2018-03-14 RX ADMIN — HYDROCODONE BITARTRATE AND ACETAMINOPHEN PRN TAB: 10; 325 TABLET ORAL at 05:41

## 2018-03-14 RX ADMIN — CARVEDILOL SCH MG: 6.25 TABLET, FILM COATED ORAL at 19:26

## 2018-04-01 ENCOUNTER — HOSPITAL ENCOUNTER (INPATIENT)
Dept: HOSPITAL 94 - ER | Age: 68
LOS: 13 days | Discharge: NURSING, REHAB OR LONG-TERM CARE | DRG: 871 | End: 2018-04-14
Attending: FAMILY MEDICINE | Admitting: FAMILY MEDICINE
Payer: OTHER GOVERNMENT

## 2018-04-01 VITALS — BODY MASS INDEX: 35.77 KG/M2 | WEIGHT: 255.52 LBS | HEIGHT: 71 IN

## 2018-04-01 DIAGNOSIS — F41.1: ICD-10-CM

## 2018-04-01 DIAGNOSIS — M19.90: ICD-10-CM

## 2018-04-01 DIAGNOSIS — B33.24: ICD-10-CM

## 2018-04-01 DIAGNOSIS — Z90.49: ICD-10-CM

## 2018-04-01 DIAGNOSIS — I13.0: ICD-10-CM

## 2018-04-01 DIAGNOSIS — Z99.81: ICD-10-CM

## 2018-04-01 DIAGNOSIS — Z79.899: ICD-10-CM

## 2018-04-01 DIAGNOSIS — A41.9: Primary | ICD-10-CM

## 2018-04-01 DIAGNOSIS — C91.10: ICD-10-CM

## 2018-04-01 DIAGNOSIS — F39: ICD-10-CM

## 2018-04-01 DIAGNOSIS — I47.1: ICD-10-CM

## 2018-04-01 DIAGNOSIS — I45.81: ICD-10-CM

## 2018-04-01 DIAGNOSIS — F41.0: ICD-10-CM

## 2018-04-01 DIAGNOSIS — N17.9: ICD-10-CM

## 2018-04-01 DIAGNOSIS — Z79.82: ICD-10-CM

## 2018-04-01 DIAGNOSIS — I24.8: ICD-10-CM

## 2018-04-01 DIAGNOSIS — R00.8: ICD-10-CM

## 2018-04-01 DIAGNOSIS — K21.9: ICD-10-CM

## 2018-04-01 DIAGNOSIS — I50.43: ICD-10-CM

## 2018-04-01 DIAGNOSIS — N18.9: ICD-10-CM

## 2018-04-01 DIAGNOSIS — Z82.49: ICD-10-CM

## 2018-04-01 DIAGNOSIS — I25.10: ICD-10-CM

## 2018-04-01 DIAGNOSIS — E66.9: ICD-10-CM

## 2018-04-01 DIAGNOSIS — E87.2: ICD-10-CM

## 2018-04-01 DIAGNOSIS — J44.1: ICD-10-CM

## 2018-04-01 DIAGNOSIS — Z77.090: ICD-10-CM

## 2018-04-01 DIAGNOSIS — E11.22: ICD-10-CM

## 2018-04-01 DIAGNOSIS — E11.42: ICD-10-CM

## 2018-04-01 DIAGNOSIS — J96.21: ICD-10-CM

## 2018-04-01 DIAGNOSIS — J44.0: ICD-10-CM

## 2018-04-01 DIAGNOSIS — J15.8: ICD-10-CM

## 2018-04-01 DIAGNOSIS — Z87.891: ICD-10-CM

## 2018-04-01 DIAGNOSIS — E11.65: ICD-10-CM

## 2018-04-01 DIAGNOSIS — J96.22: ICD-10-CM

## 2018-04-01 LAB
ALBUMIN SERPL BCP-MCNC: 3.4 G/DL (ref 3.4–5)
ALBUMIN/GLOB SERPL: 0.9 {RATIO} (ref 1.1–1.5)
ALP SERPL-CCNC: 120 IU/L (ref 46–116)
ALT SERPL W P-5'-P-CCNC: 27 U/L (ref 12–78)
ANION GAP SERPL CALCULATED.3IONS-SCNC: 10 MMOL/L (ref 8–16)
ANISOCYTOSIS BLD QL SMEAR: (no result)
APTT PPP: 28 SECONDS (ref 22–32)
AST SERPL W P-5'-P-CCNC: 36 U/L (ref 10–37)
BASE EXCESS BLDA CALC-SCNC: -12.1 MMOL/L (ref -2–3)
BASE EXCESS BLDA CALC-SCNC: -4.7 MMOL/L (ref -2–3)
BASOPHILS # BLD AUTO: (no result) X10'3 (ref 0–0.2)
BASOPHILS NFR BLD AUTO: (no result) % (ref 0–1)
BILIRUB SERPL-MCNC: 0.3 MG/DL (ref 0.1–1)
BODY TEMPERATURE: 36.4
BODY TEMPERATURE: 36.6
BUN SERPL-MCNC: 23 MG/DL (ref 7–18)
BUN/CREAT SERPL: 15.5 (ref 5.4–32)
CALCIUM SERPL-MCNC: 8.6 MG/DL (ref 8.5–10.1)
CHLORIDE SERPL-SCNC: 99 MMOL/L (ref 99–107)
CO2 SERPL-SCNC: 25.8 MMOL/L (ref 24–32)
COHGB MFR BLDA: 1.9 % (ref 0.5–1.5)
COHGB MFR BLDA: 2.2 % (ref 0.5–1.5)
CREAT SERPL-MCNC: 1.48 MG/DL (ref 0.6–1.1)
EOSINOPHIL # BLD AUTO: (no result) X10'3 (ref 0–0.9)
EOSINOPHIL NFR BLD AUTO: (no result) % (ref 0–6)
ERYTHROCYTE [DISTWIDTH] IN BLOOD BY AUTOMATED COUNT: 15.6 % (ref 11.5–14.5)
GFR SERPL CREATININE-BSD FRML MDRD: 48 ML/MIN
GLUCOSE SERPL-MCNC: 322 MG/DL (ref 70–104)
HBA1C MFR BLD: 5.6 % (ref 4.5–6.2)
HCO3 BLDA-SCNC: 20.5 MMOL/L (ref 22–26)
HCO3 BLDA-SCNC: 22.8 MMOL/L (ref 22–26)
HCT VFR BLD AUTO: 35.6 % (ref 42–52)
HGB BLD-MCNC: 12 G/DL (ref 14–17.9)
HGB BLDA-MCNC: 10.9 G/DL (ref 14–18)
HGB BLDA-MCNC: 12.2 G/DL (ref 14–18)
INHALED O2 FLOW RATE: 15 L/MIN
INR PPP: 0.9 INR
LYMPHOCYTES # BLD AUTO: (no result) X10'3 (ref 1.1–4.8)
LYMPHOCYTES NFR BLD AUTO: (no result) % (ref 21–51)
LYMPHOCYTES NFR BLD MANUAL: 93 % (ref 21–51)
MACROCYTES BLD QL SMEAR: (no result)
MCH RBC QN AUTO: 33 PG (ref 27–31)
MCHC RBC AUTO-ENTMCNC: 33.6 % (ref 33–36.5)
MCV RBC AUTO: 98 FL (ref 78–98)
METHGB MFR BLDA: 0.1 % (ref 0.3–1.12)
METHGB MFR BLDA: 0.3 % (ref 0.3–1.12)
MONOCYTES # BLD AUTO: (no result) X10'3 (ref 0–0.9)
MONOCYTES NFR BLD AUTO: (no result) % (ref 2–12)
MONOCYTES NFR BLD MANUAL: 1 % (ref 2–12)
NEUTROPHILS # BLD AUTO: (no result) X10'3 (ref 1.8–7.7)
NEUTROPHILS NFR BLD AUTO: (no result) % (ref 42–75)
NEUTS SEG NFR BLD MANUAL: 5 % (ref 42–75)
O2/TOTAL GAS SETTING VFR VENT: 100 MMHG/%
O2/TOTAL GAS SETTING VFR VENT: 35 MMHG/%
OXYHGB MFR BLDA: 92.7 % (ref 94–100)
OXYHGB MFR BLDA: 93.1 % (ref 94–100)
PCO2 TEMP ADJ BLDA: 52.6 MMHG (ref 35–48)
PCO2 TEMP ADJ BLDA: 84.8 MMHG (ref 35–48)
PH TEMP ADJ BLDA: 7 [PH] (ref 7.35–7.45)
PH TEMP ADJ BLDA: 7.25 [PH] (ref 7.35–7.45)
PLATELET # BLD AUTO: 245 X10'3 (ref 140–440)
PLATELET BLD QL SMEAR: NORMAL
PMV BLD AUTO: 7.7 FL (ref 7.4–10.4)
PO2 TEMP ADJ BLD: 106.1 MMHG (ref 83–108)
PO2 TEMP ADJ BLD: 85.9 MMHG (ref 83–108)
POTASSIUM SERPL-SCNC: 4.8 MMOL/L (ref 3.5–5.1)
PROT SERPL-MCNC: 7 G/DL (ref 6.4–8.2)
PROTHROMBIN TIME: 9.8 SECONDS (ref 9–12)
RBC # BLD AUTO: 3.63 X10'6 (ref 4.7–6.1)
RBC MORPH BLD: (no result)
RESP RATE 1H: 22 B/MIN
RESP RATE RESTING: 22 B/MIN
RESP RATE RESTING: 36 B/MIN
SAO2 % BLDA: 94.6 % (ref 95–98)
SAO2 % BLDA: 95.5 % (ref 95–98)
SMUDGE CELLS BLD QL SMEAR: (no result)
SODIUM SERPL-SCNC: 135 MMOL/L (ref 135–145)
TOTAL CELLS COUNTED FLD: 100
VARIANT LYMPHS NFR BLD MANUAL: 1 % (ref 0–0)
VOL.EXP/M/BW ON VENT: 26 L/MIN
WBC # BLD AUTO: 202.8 X10'3 (ref 4.5–11)

## 2018-04-01 PROCEDURE — 93005 ELECTROCARDIOGRAM TRACING: CPT

## 2018-04-01 PROCEDURE — 85025 COMPLETE CBC W/AUTO DIFF WBC: CPT

## 2018-04-01 PROCEDURE — 87077 CULTURE AEROBIC IDENTIFY: CPT

## 2018-04-01 PROCEDURE — 99291 CRITICAL CARE FIRST HOUR: CPT

## 2018-04-01 PROCEDURE — 80048 BASIC METABOLIC PNL TOTAL CA: CPT

## 2018-04-01 PROCEDURE — 36415 COLL VENOUS BLD VENIPUNCTURE: CPT

## 2018-04-01 PROCEDURE — 5A09457 ASSISTANCE WITH RESPIRATORY VENTILATION, 24-96 CONSECUTIVE HOURS, CONTINUOUS POSITIVE AIRWAY PRESSURE: ICD-10-PCS | Performed by: FAMILY MEDICINE

## 2018-04-01 PROCEDURE — 84484 ASSAY OF TROPONIN QUANT: CPT

## 2018-04-01 PROCEDURE — 84145 PROCALCITONIN (PCT): CPT

## 2018-04-01 PROCEDURE — 94640 AIRWAY INHALATION TREATMENT: CPT

## 2018-04-01 PROCEDURE — 94668 MNPJ CHEST WALL SBSQ: CPT

## 2018-04-01 PROCEDURE — 83880 ASSAY OF NATRIURETIC PEPTIDE: CPT

## 2018-04-01 PROCEDURE — 80202 ASSAY OF VANCOMYCIN: CPT

## 2018-04-01 PROCEDURE — 87040 BLOOD CULTURE FOR BACTERIA: CPT

## 2018-04-01 PROCEDURE — 97162 PT EVAL MOD COMPLEX 30 MIN: CPT

## 2018-04-01 PROCEDURE — 96368 THER/DIAG CONCURRENT INF: CPT

## 2018-04-01 PROCEDURE — 99285 EMERGENCY DEPT VISIT HI MDM: CPT

## 2018-04-01 PROCEDURE — 80053 COMPREHEN METABOLIC PANEL: CPT

## 2018-04-01 PROCEDURE — 94660 CPAP INITIATION&MGMT: CPT

## 2018-04-01 PROCEDURE — 83605 ASSAY OF LACTIC ACID: CPT

## 2018-04-01 PROCEDURE — 83735 ASSAY OF MAGNESIUM: CPT

## 2018-04-01 PROCEDURE — 85730 THROMBOPLASTIN TIME PARTIAL: CPT

## 2018-04-01 PROCEDURE — 83036 HEMOGLOBIN GLYCOSYLATED A1C: CPT

## 2018-04-01 PROCEDURE — 87070 CULTURE OTHR SPECIMN AEROBIC: CPT

## 2018-04-01 PROCEDURE — 36600 WITHDRAWAL OF ARTERIAL BLOOD: CPT

## 2018-04-01 PROCEDURE — 85610 PROTHROMBIN TIME: CPT

## 2018-04-01 PROCEDURE — 85018 HEMOGLOBIN: CPT

## 2018-04-01 PROCEDURE — 82948 REAGENT STRIP/BLOOD GLUCOSE: CPT

## 2018-04-01 PROCEDURE — 84550 ASSAY OF BLOOD/URIC ACID: CPT

## 2018-04-01 PROCEDURE — 82803 BLOOD GASES ANY COMBINATION: CPT

## 2018-04-01 PROCEDURE — 94760 N-INVAS EAR/PLS OXIMETRY 1: CPT

## 2018-04-01 PROCEDURE — 83615 LACTATE (LD) (LDH) ENZYME: CPT

## 2018-04-01 PROCEDURE — 97530 THERAPEUTIC ACTIVITIES: CPT

## 2018-04-01 PROCEDURE — 96375 TX/PRO/DX INJ NEW DRUG ADDON: CPT

## 2018-04-01 PROCEDURE — 97116 GAIT TRAINING THERAPY: CPT

## 2018-04-01 PROCEDURE — 71045 X-RAY EXAM CHEST 1 VIEW: CPT

## 2018-04-01 RX ADMIN — IPRATROPIUM BROMIDE AND ALBUTEROL SULFATE SCH ML: .5; 3 SOLUTION RESPIRATORY (INHALATION) at 23:22

## 2018-04-01 RX ADMIN — TAZOBACTAM SODIUM AND PIPERACILLIN SODIUM SCH MLS/HR: 375; 3 INJECTION, SOLUTION INTRAVENOUS at 22:13

## 2018-04-02 VITALS — DIASTOLIC BLOOD PRESSURE: 58 MMHG | SYSTOLIC BLOOD PRESSURE: 92 MMHG

## 2018-04-02 VITALS — SYSTOLIC BLOOD PRESSURE: 94 MMHG | DIASTOLIC BLOOD PRESSURE: 59 MMHG

## 2018-04-02 VITALS — SYSTOLIC BLOOD PRESSURE: 99 MMHG | DIASTOLIC BLOOD PRESSURE: 61 MMHG

## 2018-04-02 VITALS — SYSTOLIC BLOOD PRESSURE: 108 MMHG | DIASTOLIC BLOOD PRESSURE: 68 MMHG

## 2018-04-02 VITALS — SYSTOLIC BLOOD PRESSURE: 105 MMHG | DIASTOLIC BLOOD PRESSURE: 63 MMHG

## 2018-04-02 VITALS — SYSTOLIC BLOOD PRESSURE: 112 MMHG | DIASTOLIC BLOOD PRESSURE: 67 MMHG

## 2018-04-02 VITALS — DIASTOLIC BLOOD PRESSURE: 113 MMHG | SYSTOLIC BLOOD PRESSURE: 140 MMHG

## 2018-04-02 LAB
ALBUMIN SERPL BCP-MCNC: 2.9 G/DL (ref 3.4–5)
ANION GAP SERPL CALCULATED.3IONS-SCNC: 9 MMOL/L (ref 8–16)
BASOPHILS # BLD AUTO: 0.1 X10'3 (ref 0–0.2)
BASOPHILS NFR BLD AUTO: 0.5 % (ref 0–1)
BUN SERPL-MCNC: 26 MG/DL (ref 7–18)
BUN/CREAT SERPL: 23.6 (ref 5.4–32)
CALCIUM SERPL-MCNC: 7.8 MG/DL (ref 8.5–10.1)
CHLORIDE SERPL-SCNC: 104 MMOL/L (ref 99–107)
CO2 SERPL-SCNC: 24.6 MMOL/L (ref 24–32)
CREAT SERPL-MCNC: 1.1 MG/DL (ref 0.6–1.1)
EOSINOPHIL # BLD AUTO: 0.1 X10'3 (ref 0–0.9)
EOSINOPHIL NFR BLD AUTO: 0.4 % (ref 0–6)
ERYTHROCYTE [DISTWIDTH] IN BLOOD BY AUTOMATED COUNT: 15.9 % (ref 11.5–14.5)
GFR SERPL CREATININE-BSD FRML MDRD: 67 ML/MIN
GLUCOSE SERPL-MCNC: 101 MG/DL (ref 70–104)
HCT VFR BLD AUTO: 29.7 % (ref 42–52)
HGB BLD-MCNC: 9.9 G/DL (ref 14–17.9)
LYMPHOCYTES # BLD AUTO: 21.8 X10'3 (ref 1.1–4.8)
LYMPHOCYTES NFR BLD AUTO: 77.4 % (ref 21–51)
MCH RBC QN AUTO: 31.1 PG (ref 27–31)
MCHC RBC AUTO-ENTMCNC: 33.5 % (ref 33–36.5)
MCV RBC AUTO: 92.9 FL (ref 78–98)
MONOCYTES # BLD AUTO: 0.2 X10'3 (ref 0–0.9)
MONOCYTES NFR BLD AUTO: 0.7 % (ref 2–12)
NEUTROPHILS # BLD AUTO: 5.9 X10'3 (ref 1.8–7.7)
NEUTROPHILS NFR BLD AUTO: 21 % (ref 42–75)
PLATELET # BLD AUTO: 122 X10'3 (ref 140–440)
PMV BLD AUTO: 7.8 FL (ref 7.4–10.4)
POTASSIUM SERPL-SCNC: 4.9 MMOL/L (ref 3.5–5.1)
RBC # BLD AUTO: 3.2 X10'6 (ref 4.7–6.1)
SODIUM SERPL-SCNC: 138 MMOL/L (ref 135–145)
WBC # BLD AUTO: 28.1 X10'3 (ref 4.5–11)

## 2018-04-02 RX ADMIN — IPRATROPIUM BROMIDE AND ALBUTEROL SULFATE SCH ML: .5; 3 SOLUTION RESPIRATORY (INHALATION) at 17:29

## 2018-04-02 RX ADMIN — TAZOBACTAM SODIUM AND PIPERACILLIN SODIUM SCH MLS/HR: 375; 3 INJECTION, SOLUTION INTRAVENOUS at 04:57

## 2018-04-02 RX ADMIN — METOCLOPRAMIDE PRN MG: 5 INJECTION, SOLUTION INTRAMUSCULAR; INTRAVENOUS at 19:20

## 2018-04-02 RX ADMIN — CARVEDILOL SCH MG: 6.25 TABLET, FILM COATED ORAL at 19:21

## 2018-04-02 RX ADMIN — BUDESONIDE SCH MG: 0.5 INHALANT RESPIRATORY (INHALATION) at 08:00

## 2018-04-02 RX ADMIN — MORPHINE SULFATE PRN MG: 4 INJECTION, SOLUTION INTRAMUSCULAR; INTRAVENOUS at 04:56

## 2018-04-02 RX ADMIN — MORPHINE SULFATE PRN MG: 4 INJECTION, SOLUTION INTRAMUSCULAR; INTRAVENOUS at 08:48

## 2018-04-02 RX ADMIN — GUAIFENESIN SCH MG: 600 TABLET, EXTENDED RELEASE ORAL at 08:46

## 2018-04-02 RX ADMIN — IPRATROPIUM BROMIDE AND ALBUTEROL SULFATE SCH ML: .5; 3 SOLUTION RESPIRATORY (INHALATION) at 19:38

## 2018-04-02 RX ADMIN — FUROSEMIDE SCH MG: 10 INJECTION, SOLUTION INTRAMUSCULAR; INTRAVENOUS at 09:35

## 2018-04-02 RX ADMIN — IPRATROPIUM BROMIDE AND ALBUTEROL SULFATE SCH ML: .5; 3 SOLUTION RESPIRATORY (INHALATION) at 03:08

## 2018-04-02 RX ADMIN — MORPHINE SULFATE PRN MG: 4 INJECTION, SOLUTION INTRAMUSCULAR; INTRAVENOUS at 17:28

## 2018-04-02 RX ADMIN — CARVEDILOL SCH MG: 6.25 TABLET, FILM COATED ORAL at 08:00

## 2018-04-02 RX ADMIN — GUAIFENESIN SCH MG: 600 TABLET, EXTENDED RELEASE ORAL at 19:20

## 2018-04-02 RX ADMIN — Medication SCH MG: at 08:47

## 2018-04-02 RX ADMIN — TAZOBACTAM SODIUM AND PIPERACILLIN SODIUM SCH MLS/HR: 375; 3 INJECTION, SOLUTION INTRAVENOUS at 02:00

## 2018-04-02 RX ADMIN — BUDESONIDE SCH MG: 0.5 INHALANT RESPIRATORY (INHALATION) at 19:38

## 2018-04-02 RX ADMIN — PANTOPRAZOLE SODIUM SCH MG: 40 TABLET, DELAYED RELEASE ORAL at 08:46

## 2018-04-02 RX ADMIN — TAZOBACTAM SODIUM AND PIPERACILLIN SODIUM SCH MLS/HR: 375; 3 INJECTION, SOLUTION INTRAVENOUS at 08:45

## 2018-04-02 RX ADMIN — TAZOBACTAM SODIUM AND PIPERACILLIN SODIUM SCH MLS/HR: 500; 4 INJECTION, SOLUTION INTRAVENOUS at 17:20

## 2018-04-02 RX ADMIN — ONDANSETRON PRN MG: 2 INJECTION, SOLUTION INTRAMUSCULAR; INTRAVENOUS at 14:55

## 2018-04-02 RX ADMIN — Medication SCH MMU: at 19:20

## 2018-04-02 RX ADMIN — INSULIN GLARGINE SCH UNIT: 100 INJECTION, SOLUTION SUBCUTANEOUS at 21:00

## 2018-04-02 RX ADMIN — IPRATROPIUM BROMIDE AND ALBUTEROL SULFATE SCH ML: .5; 3 SOLUTION RESPIRATORY (INHALATION) at 08:06

## 2018-04-02 RX ADMIN — IPRATROPIUM BROMIDE AND ALBUTEROL SULFATE SCH ML: .5; 3 SOLUTION RESPIRATORY (INHALATION) at 11:59

## 2018-04-02 RX ADMIN — MORPHINE SULFATE PRN MG: 4 INJECTION, SOLUTION INTRAMUSCULAR; INTRAVENOUS at 21:46

## 2018-04-02 RX ADMIN — Medication SCH MMU: at 08:47

## 2018-04-02 RX ADMIN — IPRATROPIUM BROMIDE AND ALBUTEROL SULFATE SCH ML: .5; 3 SOLUTION RESPIRATORY (INHALATION) at 15:00

## 2018-04-02 RX ADMIN — MORPHINE SULFATE PRN MG: 4 INJECTION, SOLUTION INTRAMUSCULAR; INTRAVENOUS at 12:21

## 2018-04-02 RX ADMIN — HEPARIN SODIUM SCH UNIT: 5000 INJECTION, SOLUTION INTRAVENOUS; SUBCUTANEOUS at 19:28

## 2018-04-03 VITALS — SYSTOLIC BLOOD PRESSURE: 95 MMHG | DIASTOLIC BLOOD PRESSURE: 60 MMHG

## 2018-04-03 VITALS — SYSTOLIC BLOOD PRESSURE: 92 MMHG | DIASTOLIC BLOOD PRESSURE: 60 MMHG

## 2018-04-03 VITALS — DIASTOLIC BLOOD PRESSURE: 66 MMHG | SYSTOLIC BLOOD PRESSURE: 101 MMHG

## 2018-04-03 VITALS — DIASTOLIC BLOOD PRESSURE: 62 MMHG | SYSTOLIC BLOOD PRESSURE: 93 MMHG

## 2018-04-03 VITALS — DIASTOLIC BLOOD PRESSURE: 61 MMHG | SYSTOLIC BLOOD PRESSURE: 96 MMHG

## 2018-04-03 VITALS — SYSTOLIC BLOOD PRESSURE: 106 MMHG | DIASTOLIC BLOOD PRESSURE: 63 MMHG

## 2018-04-03 VITALS — SYSTOLIC BLOOD PRESSURE: 96 MMHG | DIASTOLIC BLOOD PRESSURE: 62 MMHG

## 2018-04-03 VITALS — DIASTOLIC BLOOD PRESSURE: 65 MMHG | SYSTOLIC BLOOD PRESSURE: 101 MMHG

## 2018-04-03 LAB
ALBUMIN SERPL BCP-MCNC: 3.1 G/DL (ref 3.4–5)
ANION GAP SERPL CALCULATED.3IONS-SCNC: 10 MMOL/L (ref 8–16)
BASOPHILS # BLD AUTO: 0.1 X10'3 (ref 0–0.2)
BASOPHILS NFR BLD AUTO: 0.4 % (ref 0–1)
BUN SERPL-MCNC: 20 MG/DL (ref 7–18)
BUN/CREAT SERPL: 15.4 (ref 5.4–32)
CALCIUM SERPL-MCNC: 8.5 MG/DL (ref 8.5–10.1)
CHLORIDE SERPL-SCNC: 104 MMOL/L (ref 99–107)
CO2 SERPL-SCNC: 24.8 MMOL/L (ref 24–32)
CREAT SERPL-MCNC: 1.3 MG/DL (ref 0.6–1.1)
EOSINOPHIL # BLD AUTO: 0.1 X10'3 (ref 0–0.9)
EOSINOPHIL NFR BLD AUTO: 0.9 % (ref 0–6)
ERYTHROCYTE [DISTWIDTH] IN BLOOD BY AUTOMATED COUNT: 15.7 % (ref 11.5–14.5)
GFR SERPL CREATININE-BSD FRML MDRD: 55 ML/MIN
GLUCOSE SERPL-MCNC: 110 MG/DL (ref 70–104)
HCT VFR BLD AUTO: 26.6 % (ref 42–52)
HGB BLD-MCNC: 9.2 G/DL (ref 14–17.9)
LYMPHOCYTES # BLD AUTO: 13.1 X10'3 (ref 1.1–4.8)
LYMPHOCYTES NFR BLD AUTO: 84.2 % (ref 21–51)
MCH RBC QN AUTO: 31.9 PG (ref 27–31)
MCHC RBC AUTO-ENTMCNC: 34.6 % (ref 33–36.5)
MCV RBC AUTO: 92 FL (ref 78–98)
MONOCYTES # BLD AUTO: 0.1 X10'3 (ref 0–0.9)
MONOCYTES NFR BLD AUTO: 0.7 % (ref 2–12)
NEUTROPHILS # BLD AUTO: 2.1 X10'3 (ref 1.8–7.7)
NEUTROPHILS NFR BLD AUTO: 13.8 % (ref 42–75)
PLATELET # BLD AUTO: 87 X10'3 (ref 140–440)
PMV BLD AUTO: 8 FL (ref 7.4–10.4)
POTASSIUM SERPL-SCNC: 3.8 MMOL/L (ref 3.5–5.1)
RBC # BLD AUTO: 2.89 X10'6 (ref 4.7–6.1)
SODIUM SERPL-SCNC: 139 MMOL/L (ref 135–145)
WBC # BLD AUTO: 15.6 X10'3 (ref 4.5–11)

## 2018-04-03 RX ADMIN — CARVEDILOL SCH MG: 6.25 TABLET, FILM COATED ORAL at 07:44

## 2018-04-03 RX ADMIN — MORPHINE SULFATE PRN MG: 4 INJECTION, SOLUTION INTRAMUSCULAR; INTRAVENOUS at 19:06

## 2018-04-03 RX ADMIN — ONDANSETRON PRN MG: 2 INJECTION, SOLUTION INTRAMUSCULAR; INTRAVENOUS at 19:16

## 2018-04-03 RX ADMIN — Medication SCH MG: at 07:40

## 2018-04-03 RX ADMIN — IPRATROPIUM BROMIDE AND ALBUTEROL SULFATE SCH ML: .5; 3 SOLUTION RESPIRATORY (INHALATION) at 14:56

## 2018-04-03 RX ADMIN — IPRATROPIUM BROMIDE AND ALBUTEROL SULFATE SCH ML: .5; 3 SOLUTION RESPIRATORY (INHALATION) at 23:12

## 2018-04-03 RX ADMIN — GUAIFENESIN SCH MG: 600 TABLET, EXTENDED RELEASE ORAL at 07:40

## 2018-04-03 RX ADMIN — MORPHINE SULFATE PRN MG: 4 INJECTION, SOLUTION INTRAMUSCULAR; INTRAVENOUS at 02:09

## 2018-04-03 RX ADMIN — HEPARIN SODIUM SCH UNIT: 5000 INJECTION, SOLUTION INTRAVENOUS; SUBCUTANEOUS at 19:04

## 2018-04-03 RX ADMIN — TAZOBACTAM SODIUM AND PIPERACILLIN SODIUM SCH MLS/HR: 500; 4 INJECTION, SOLUTION INTRAVENOUS at 00:52

## 2018-04-03 RX ADMIN — Medication SCH MMU: at 07:41

## 2018-04-03 RX ADMIN — MORPHINE SULFATE PRN MG: 4 INJECTION, SOLUTION INTRAMUSCULAR; INTRAVENOUS at 23:34

## 2018-04-03 RX ADMIN — MORPHINE SULFATE PRN MG: 4 INJECTION, SOLUTION INTRAMUSCULAR; INTRAVENOUS at 14:03

## 2018-04-03 RX ADMIN — INSULIN GLARGINE SCH UNIT: 100 INJECTION, SOLUTION SUBCUTANEOUS at 21:00

## 2018-04-03 RX ADMIN — IPRATROPIUM BROMIDE AND ALBUTEROL SULFATE SCH ML: .5; 3 SOLUTION RESPIRATORY (INHALATION) at 07:08

## 2018-04-03 RX ADMIN — HEPARIN SODIUM SCH UNIT: 5000 INJECTION, SOLUTION INTRAVENOUS; SUBCUTANEOUS at 07:43

## 2018-04-03 RX ADMIN — METOCLOPRAMIDE PRN MG: 5 INJECTION, SOLUTION INTRAMUSCULAR; INTRAVENOUS at 14:03

## 2018-04-03 RX ADMIN — BUDESONIDE SCH MG: 0.5 INHALANT RESPIRATORY (INHALATION) at 23:12

## 2018-04-03 RX ADMIN — IPRATROPIUM BROMIDE AND ALBUTEROL SULFATE SCH ML: .5; 3 SOLUTION RESPIRATORY (INHALATION) at 03:28

## 2018-04-03 RX ADMIN — IPRATROPIUM BROMIDE AND ALBUTEROL SULFATE SCH ML: .5; 3 SOLUTION RESPIRATORY (INHALATION) at 11:00

## 2018-04-03 RX ADMIN — GUAIFENESIN SCH MG: 600 TABLET, EXTENDED RELEASE ORAL at 19:06

## 2018-04-03 RX ADMIN — MORPHINE SULFATE PRN MG: 4 INJECTION, SOLUTION INTRAMUSCULAR; INTRAVENOUS at 07:57

## 2018-04-03 RX ADMIN — FUROSEMIDE SCH MG: 10 INJECTION, SOLUTION INTRAMUSCULAR; INTRAVENOUS at 07:42

## 2018-04-03 RX ADMIN — TAZOBACTAM SODIUM AND PIPERACILLIN SODIUM SCH MLS/HR: 500; 4 INJECTION, SOLUTION INTRAVENOUS at 07:39

## 2018-04-03 RX ADMIN — PANTOPRAZOLE SODIUM SCH MG: 40 TABLET, DELAYED RELEASE ORAL at 07:41

## 2018-04-03 RX ADMIN — CARVEDILOL SCH MG: 6.25 TABLET, FILM COATED ORAL at 19:14

## 2018-04-03 RX ADMIN — IPRATROPIUM BROMIDE AND ALBUTEROL SULFATE SCH ML: .5; 3 SOLUTION RESPIRATORY (INHALATION) at 19:38

## 2018-04-03 RX ADMIN — BUDESONIDE SCH MG: 0.5 INHALANT RESPIRATORY (INHALATION) at 07:08

## 2018-04-03 RX ADMIN — TAZOBACTAM SODIUM AND PIPERACILLIN SODIUM SCH MLS/HR: 500; 4 INJECTION, SOLUTION INTRAVENOUS at 15:48

## 2018-04-03 RX ADMIN — Medication SCH MMU: at 19:06

## 2018-04-04 VITALS — DIASTOLIC BLOOD PRESSURE: 62 MMHG | SYSTOLIC BLOOD PRESSURE: 94 MMHG

## 2018-04-04 VITALS — SYSTOLIC BLOOD PRESSURE: 90 MMHG | DIASTOLIC BLOOD PRESSURE: 59 MMHG

## 2018-04-04 VITALS — DIASTOLIC BLOOD PRESSURE: 68 MMHG | SYSTOLIC BLOOD PRESSURE: 98 MMHG

## 2018-04-04 VITALS — DIASTOLIC BLOOD PRESSURE: 62 MMHG | SYSTOLIC BLOOD PRESSURE: 100 MMHG

## 2018-04-04 VITALS — SYSTOLIC BLOOD PRESSURE: 96 MMHG | DIASTOLIC BLOOD PRESSURE: 67 MMHG

## 2018-04-04 VITALS — DIASTOLIC BLOOD PRESSURE: 63 MMHG | SYSTOLIC BLOOD PRESSURE: 88 MMHG

## 2018-04-04 LAB
ALBUMIN SERPL BCP-MCNC: 3.1 G/DL (ref 3.4–5)
ANION GAP SERPL CALCULATED.3IONS-SCNC: 8 MMOL/L (ref 8–16)
ANISOCYTOSIS BLD QL SMEAR: (no result)
BASOPHILS # BLD AUTO: 0 X10'3 (ref 0–0.2)
BASOPHILS NFR BLD AUTO: 0.3 % (ref 0–1)
BLASTS NFR BLD MANUAL: 1 % (ref 0–0)
BUN SERPL-MCNC: 18 MG/DL (ref 7–18)
BUN/CREAT SERPL: 14.1 (ref 5.4–32)
CALCIUM SERPL-MCNC: 8.5 MG/DL (ref 8.5–10.1)
CHLORIDE SERPL-SCNC: 102 MMOL/L (ref 99–107)
CO2 SERPL-SCNC: 27.7 MMOL/L (ref 24–32)
CREAT SERPL-MCNC: 1.28 MG/DL (ref 0.6–1.1)
EOSINOPHIL # BLD AUTO: 0.1 X10'3 (ref 0–0.9)
EOSINOPHIL NFR BLD AUTO: 1.1 % (ref 0–6)
EOSINOPHIL NFR BLD MANUAL: 1 % (ref 0–6)
ERYTHROCYTE [DISTWIDTH] IN BLOOD BY AUTOMATED COUNT: 15.5 % (ref 11.5–14.5)
GFR SERPL CREATININE-BSD FRML MDRD: 56 ML/MIN
GLUCOSE SERPL-MCNC: 110 MG/DL (ref 70–104)
HCT VFR BLD AUTO: 26.6 % (ref 42–52)
HGB BLD-MCNC: 9 G/DL (ref 14–17.9)
LDH SERPL-CCNC: 155 U/L (ref 85–227)
LYMPHOCYTES # BLD AUTO: 9.9 X10'3 (ref 1.1–4.8)
LYMPHOCYTES NFR BLD AUTO: 77.3 % (ref 21–51)
LYMPHOCYTES NFR BLD MANUAL: 85 % (ref 21–51)
MCH RBC QN AUTO: 31.4 PG (ref 27–31)
MCHC RBC AUTO-ENTMCNC: 33.9 % (ref 33–36.5)
MCV RBC AUTO: 92.7 FL (ref 78–98)
MONOCYTES # BLD AUTO: 0.1 X10'3 (ref 0–0.9)
MONOCYTES NFR BLD AUTO: 0.7 % (ref 2–12)
NEUTROPHILS # BLD AUTO: 2.6 X10'3 (ref 1.8–7.7)
NEUTROPHILS NFR BLD AUTO: 20.6 % (ref 42–75)
NEUTS SEG NFR BLD MANUAL: 13 % (ref 42–75)
PLATELET # BLD AUTO: 98 X10'3 (ref 140–440)
PLATELET BLD QL SMEAR: (no result)
PMV BLD AUTO: 8.6 FL (ref 7.4–10.4)
POTASSIUM SERPL-SCNC: 3.6 MMOL/L (ref 3.5–5.1)
RBC # BLD AUTO: 2.87 X10'6 (ref 4.7–6.1)
RBC MORPH BLD: (no result)
SMUDGE CELLS BLD QL SMEAR: (no result)
SODIUM SERPL-SCNC: 138 MMOL/L (ref 135–145)
TOTAL CELLS COUNTED FLD: 100
WBC # BLD AUTO: 12.8 X10'3 (ref 4.5–11)

## 2018-04-04 RX ADMIN — CARVEDILOL SCH MG: 6.25 TABLET, FILM COATED ORAL at 07:14

## 2018-04-04 RX ADMIN — IPRATROPIUM BROMIDE AND ALBUTEROL SULFATE SCH ML: .5; 3 SOLUTION RESPIRATORY (INHALATION) at 07:58

## 2018-04-04 RX ADMIN — GUAIFENESIN SCH MG: 600 TABLET, EXTENDED RELEASE ORAL at 07:14

## 2018-04-04 RX ADMIN — TAZOBACTAM SODIUM AND PIPERACILLIN SODIUM SCH MLS/HR: 375; 3 INJECTION, SOLUTION INTRAVENOUS at 07:13

## 2018-04-04 RX ADMIN — Medication SCH MG: at 07:14

## 2018-04-04 RX ADMIN — HYDROCODONE BITARTRATE AND ACETAMINOPHEN PRN TAB: 5; 325 TABLET ORAL at 22:10

## 2018-04-04 RX ADMIN — HYDROCODONE BITARTRATE AND ACETAMINOPHEN PRN TAB: 5; 325 TABLET ORAL at 17:57

## 2018-04-04 RX ADMIN — IPRATROPIUM BROMIDE AND ALBUTEROL SULFATE SCH ML: .5; 3 SOLUTION RESPIRATORY (INHALATION) at 03:02

## 2018-04-04 RX ADMIN — INSULIN GLARGINE SCH UNIT: 100 INJECTION, SOLUTION SUBCUTANEOUS at 21:00

## 2018-04-04 RX ADMIN — TAZOBACTAM SODIUM AND PIPERACILLIN SODIUM SCH MLS/HR: 375; 3 INJECTION, SOLUTION INTRAVENOUS at 02:32

## 2018-04-04 RX ADMIN — HEPARIN SODIUM SCH UNIT: 5000 INJECTION, SOLUTION INTRAVENOUS; SUBCUTANEOUS at 20:35

## 2018-04-04 RX ADMIN — CARVEDILOL SCH MG: 6.25 TABLET, FILM COATED ORAL at 20:00

## 2018-04-04 RX ADMIN — PANTOPRAZOLE SODIUM SCH MG: 40 TABLET, DELAYED RELEASE ORAL at 07:14

## 2018-04-04 RX ADMIN — GUAIFENESIN SCH MG: 600 TABLET, EXTENDED RELEASE ORAL at 20:34

## 2018-04-04 RX ADMIN — FUROSEMIDE SCH MG: 10 INJECTION, SOLUTION INTRAMUSCULAR; INTRAVENOUS at 07:14

## 2018-04-04 RX ADMIN — BUDESONIDE SCH MG: 0.5 INHALANT RESPIRATORY (INHALATION) at 20:24

## 2018-04-04 RX ADMIN — HYDROCODONE BITARTRATE AND ACETAMINOPHEN PRN TAB: 5; 325 TABLET ORAL at 12:21

## 2018-04-04 RX ADMIN — IPRATROPIUM BROMIDE AND ALBUTEROL SULFATE SCH ML: .5; 3 SOLUTION RESPIRATORY (INHALATION) at 20:24

## 2018-04-04 RX ADMIN — IPRATROPIUM BROMIDE AND ALBUTEROL SULFATE SCH ML: .5; 3 SOLUTION RESPIRATORY (INHALATION) at 15:11

## 2018-04-04 RX ADMIN — HEPARIN SODIUM SCH UNIT: 5000 INJECTION, SOLUTION INTRAVENOUS; SUBCUTANEOUS at 08:00

## 2018-04-04 RX ADMIN — Medication SCH MMU: at 07:14

## 2018-04-04 RX ADMIN — ONDANSETRON PRN MG: 2 INJECTION, SOLUTION INTRAMUSCULAR; INTRAVENOUS at 12:19

## 2018-04-04 RX ADMIN — HYDROCODONE BITARTRATE AND ACETAMINOPHEN PRN TAB: 5; 325 TABLET ORAL at 04:42

## 2018-04-04 RX ADMIN — Medication SCH MMU: at 20:34

## 2018-04-04 RX ADMIN — IPRATROPIUM BROMIDE AND ALBUTEROL SULFATE SCH ML: .5; 3 SOLUTION RESPIRATORY (INHALATION) at 12:39

## 2018-04-04 RX ADMIN — BUDESONIDE SCH MG: 0.5 INHALANT RESPIRATORY (INHALATION) at 07:58

## 2018-04-04 RX ADMIN — IPRATROPIUM BROMIDE AND ALBUTEROL SULFATE SCH ML: .5; 3 SOLUTION RESPIRATORY (INHALATION) at 23:38

## 2018-04-04 RX ADMIN — TAZOBACTAM SODIUM AND PIPERACILLIN SODIUM SCH MLS/HR: 375; 3 INJECTION, SOLUTION INTRAVENOUS at 08:00

## 2018-04-04 RX ADMIN — METOCLOPRAMIDE PRN MG: 5 INJECTION, SOLUTION INTRAMUSCULAR; INTRAVENOUS at 21:39

## 2018-04-04 RX ADMIN — ONDANSETRON PRN MG: 2 INJECTION, SOLUTION INTRAMUSCULAR; INTRAVENOUS at 17:55

## 2018-04-05 VITALS — DIASTOLIC BLOOD PRESSURE: 66 MMHG | SYSTOLIC BLOOD PRESSURE: 100 MMHG

## 2018-04-05 VITALS — SYSTOLIC BLOOD PRESSURE: 114 MMHG | DIASTOLIC BLOOD PRESSURE: 78 MMHG

## 2018-04-05 VITALS — SYSTOLIC BLOOD PRESSURE: 96 MMHG | DIASTOLIC BLOOD PRESSURE: 59 MMHG

## 2018-04-05 VITALS — DIASTOLIC BLOOD PRESSURE: 64 MMHG | SYSTOLIC BLOOD PRESSURE: 94 MMHG

## 2018-04-05 VITALS — DIASTOLIC BLOOD PRESSURE: 56 MMHG | SYSTOLIC BLOOD PRESSURE: 93 MMHG

## 2018-04-05 VITALS — DIASTOLIC BLOOD PRESSURE: 57 MMHG | SYSTOLIC BLOOD PRESSURE: 90 MMHG

## 2018-04-05 LAB
ALBUMIN SERPL BCP-MCNC: 3.1 G/DL (ref 3.4–5)
ANION GAP SERPL CALCULATED.3IONS-SCNC: 6 MMOL/L (ref 8–16)
BASE EXCESS BLDA CALC-SCNC: 0.6 MMOL/L (ref -2–3)
BASOPHILS # BLD AUTO: 0 X10'3 (ref 0–0.2)
BASOPHILS NFR BLD AUTO: 0.3 % (ref 0–1)
BODY TEMPERATURE: 37
BUN SERPL-MCNC: 19 MG/DL (ref 7–18)
BUN/CREAT SERPL: 14.1 (ref 5.4–32)
CALCIUM SERPL-MCNC: 8.7 MG/DL (ref 8.5–10.1)
CHLORIDE SERPL-SCNC: 103 MMOL/L (ref 99–107)
CO2 SERPL-SCNC: 29.7 MMOL/L (ref 24–32)
COHGB MFR BLDA: 0.1 % (ref 0.5–1.5)
CREAT SERPL-MCNC: 1.35 MG/DL (ref 0.6–1.1)
EOSINOPHIL # BLD AUTO: 0.1 X10'3 (ref 0–0.9)
EOSINOPHIL NFR BLD AUTO: 0.7 % (ref 0–6)
ERYTHROCYTE [DISTWIDTH] IN BLOOD BY AUTOMATED COUNT: 15.7 % (ref 11.5–14.5)
GFR SERPL CREATININE-BSD FRML MDRD: 53 ML/MIN
GLUCOSE SERPL-MCNC: 101 MG/DL (ref 70–104)
HCO3 BLDA-SCNC: 26.3 MMOL/L (ref 22–26)
HCT VFR BLD AUTO: 26.4 % (ref 42–52)
HGB BLD-MCNC: 8.9 G/DL (ref 14–17.9)
HGB BLDA-MCNC: 10.3 G/DL (ref 14–18)
INHALED O2 FLOW RATE: 3 L/MIN
LYMPHOCYTES # BLD AUTO: 11.8 X10'3 (ref 1.1–4.8)
LYMPHOCYTES NFR BLD AUTO: 80.3 % (ref 21–51)
MCH RBC QN AUTO: 31.4 PG (ref 27–31)
MCHC RBC AUTO-ENTMCNC: 33.6 % (ref 33–36.5)
MCV RBC AUTO: 93.6 FL (ref 78–98)
METHGB MFR BLDA: 0.2 % (ref 0.3–1.12)
MONOCYTES # BLD AUTO: 0.1 X10'3 (ref 0–0.9)
MONOCYTES NFR BLD AUTO: 0.7 % (ref 2–12)
NEUTROPHILS # BLD AUTO: 2.7 X10'3 (ref 1.8–7.7)
NEUTROPHILS NFR BLD AUTO: 18 % (ref 42–75)
O2/TOTAL GAS SETTING VFR VENT: 32 MMHG/%
OXYHGB MFR BLDA: 90.4 % (ref 94–100)
PCO2 TEMP ADJ BLDA: 46.7 MMHG (ref 35–48)
PH TEMP ADJ BLDA: 7.37 [PH] (ref 7.35–7.45)
PLATELET # BLD AUTO: 95 X10'3 (ref 140–440)
PMV BLD AUTO: 8.4 FL (ref 7.4–10.4)
PO2 TEMP ADJ BLD: 60.4 MMHG (ref 83–108)
POTASSIUM SERPL-SCNC: 3.4 MMOL/L (ref 3.5–5.1)
RBC # BLD AUTO: 2.82 X10'6 (ref 4.7–6.1)
SAO2 % BLDA: 90.7 % (ref 95–98)
SODIUM SERPL-SCNC: 139 MMOL/L (ref 135–145)
WBC # BLD AUTO: 14.7 X10'3 (ref 4.5–11)

## 2018-04-05 RX ADMIN — IPRATROPIUM BROMIDE AND ALBUTEROL SULFATE SCH ML: .5; 3 SOLUTION RESPIRATORY (INHALATION) at 23:13

## 2018-04-05 RX ADMIN — HEPARIN SODIUM SCH UNIT: 5000 INJECTION, SOLUTION INTRAVENOUS; SUBCUTANEOUS at 20:28

## 2018-04-05 RX ADMIN — POTASSIUM CHLORIDE PRN MEQ: 1500 TABLET, EXTENDED RELEASE ORAL at 12:13

## 2018-04-05 RX ADMIN — IPRATROPIUM BROMIDE AND ALBUTEROL SULFATE SCH ML: .5; 3 SOLUTION RESPIRATORY (INHALATION) at 08:34

## 2018-04-05 RX ADMIN — POTASSIUM CHLORIDE PRN MEQ: 1500 TABLET, EXTENDED RELEASE ORAL at 08:36

## 2018-04-05 RX ADMIN — HEPARIN SODIUM SCH UNIT: 5000 INJECTION, SOLUTION INTRAVENOUS; SUBCUTANEOUS at 08:37

## 2018-04-05 RX ADMIN — BUDESONIDE SCH MG: 0.5 INHALANT RESPIRATORY (INHALATION) at 19:26

## 2018-04-05 RX ADMIN — HYDROCODONE BITARTRATE AND ACETAMINOPHEN PRN TAB: 5; 325 TABLET ORAL at 04:33

## 2018-04-05 RX ADMIN — ONDANSETRON PRN MG: 2 INJECTION, SOLUTION INTRAMUSCULAR; INTRAVENOUS at 17:35

## 2018-04-05 RX ADMIN — HYDROCODONE BITARTRATE AND ACETAMINOPHEN PRN TAB: 5; 325 TABLET ORAL at 19:05

## 2018-04-05 RX ADMIN — METOCLOPRAMIDE PRN MG: 5 INJECTION, SOLUTION INTRAMUSCULAR; INTRAVENOUS at 12:11

## 2018-04-05 RX ADMIN — HYDROCODONE BITARTRATE AND ACETAMINOPHEN PRN TAB: 5; 325 TABLET ORAL at 14:54

## 2018-04-05 RX ADMIN — FUROSEMIDE SCH MG: 10 INJECTION, SOLUTION INTRAMUSCULAR; INTRAVENOUS at 08:00

## 2018-04-05 RX ADMIN — HYDROCODONE BITARTRATE AND ACETAMINOPHEN PRN TAB: 5; 325 TABLET ORAL at 22:58

## 2018-04-05 RX ADMIN — GUAIFENESIN SCH MG: 600 TABLET, EXTENDED RELEASE ORAL at 20:24

## 2018-04-05 RX ADMIN — IPRATROPIUM BROMIDE AND ALBUTEROL SULFATE SCH ML: .5; 3 SOLUTION RESPIRATORY (INHALATION) at 12:13

## 2018-04-05 RX ADMIN — POTASSIUM CHLORIDE PRN MEQ: 1500 TABLET, EXTENDED RELEASE ORAL at 16:21

## 2018-04-05 RX ADMIN — INSULIN GLARGINE SCH UNIT: 100 INJECTION, SOLUTION SUBCUTANEOUS at 21:00

## 2018-04-05 RX ADMIN — IPRATROPIUM BROMIDE AND ALBUTEROL SULFATE SCH ML: .5; 3 SOLUTION RESPIRATORY (INHALATION) at 19:26

## 2018-04-05 RX ADMIN — ONDANSETRON PRN MG: 2 INJECTION, SOLUTION INTRAMUSCULAR; INTRAVENOUS at 07:47

## 2018-04-05 RX ADMIN — Medication SCH MMU: at 08:36

## 2018-04-05 RX ADMIN — HYDROCODONE BITARTRATE AND ACETAMINOPHEN PRN TAB: 5; 325 TABLET ORAL at 08:37

## 2018-04-05 RX ADMIN — Medication SCH MMU: at 20:23

## 2018-04-05 RX ADMIN — PANTOPRAZOLE SODIUM SCH MG: 40 TABLET, DELAYED RELEASE ORAL at 08:35

## 2018-04-05 RX ADMIN — CARVEDILOL SCH MG: 6.25 TABLET, FILM COATED ORAL at 08:00

## 2018-04-05 RX ADMIN — GUAIFENESIN SCH MG: 600 TABLET, EXTENDED RELEASE ORAL at 08:36

## 2018-04-05 RX ADMIN — BUDESONIDE SCH MG: 0.5 INHALANT RESPIRATORY (INHALATION) at 08:34

## 2018-04-05 RX ADMIN — Medication SCH MG: at 08:36

## 2018-04-06 VITALS — SYSTOLIC BLOOD PRESSURE: 90 MMHG | DIASTOLIC BLOOD PRESSURE: 62 MMHG

## 2018-04-06 VITALS — SYSTOLIC BLOOD PRESSURE: 106 MMHG | DIASTOLIC BLOOD PRESSURE: 69 MMHG

## 2018-04-06 VITALS — SYSTOLIC BLOOD PRESSURE: 101 MMHG | DIASTOLIC BLOOD PRESSURE: 74 MMHG

## 2018-04-06 VITALS — SYSTOLIC BLOOD PRESSURE: 117 MMHG | DIASTOLIC BLOOD PRESSURE: 74 MMHG

## 2018-04-06 VITALS — DIASTOLIC BLOOD PRESSURE: 73 MMHG | SYSTOLIC BLOOD PRESSURE: 103 MMHG

## 2018-04-06 VITALS — SYSTOLIC BLOOD PRESSURE: 107 MMHG | DIASTOLIC BLOOD PRESSURE: 68 MMHG

## 2018-04-06 LAB
ALBUMIN SERPL BCP-MCNC: 3.3 G/DL (ref 3.4–5)
ANION GAP SERPL CALCULATED.3IONS-SCNC: 8 MMOL/L (ref 8–16)
BASOPHILS # BLD AUTO: 0.2 X10'3 (ref 0–0.2)
BASOPHILS NFR BLD AUTO: 0.9 % (ref 0–1)
BUN SERPL-MCNC: 21 MG/DL (ref 7–18)
BUN/CREAT SERPL: 15.4 (ref 5.4–32)
CALCIUM SERPL-MCNC: 8.7 MG/DL (ref 8.5–10.1)
CHLORIDE SERPL-SCNC: 105 MMOL/L (ref 99–107)
CO2 SERPL-SCNC: 27.7 MMOL/L (ref 24–32)
CREAT SERPL-MCNC: 1.36 MG/DL (ref 0.6–1.1)
EOSINOPHIL # BLD AUTO: 0.3 X10'3 (ref 0–0.9)
EOSINOPHIL NFR BLD AUTO: 1.3 % (ref 0–6)
ERYTHROCYTE [DISTWIDTH] IN BLOOD BY AUTOMATED COUNT: 16.1 % (ref 11.5–14.5)
GFR SERPL CREATININE-BSD FRML MDRD: 52 ML/MIN
GLUCOSE SERPL-MCNC: 108 MG/DL (ref 70–104)
HCT VFR BLD AUTO: 29.5 % (ref 42–52)
HGB BLD-MCNC: 9.8 G/DL (ref 14–17.9)
LYMPHOCYTES # BLD AUTO: 15.7 X10'3 (ref 1.1–4.8)
LYMPHOCYTES NFR BLD AUTO: 78.1 % (ref 21–51)
MAGNESIUM SERPL-MCNC: 1.7 MG/DL (ref 1.5–2.4)
MCH RBC QN AUTO: 31.1 PG (ref 27–31)
MCHC RBC AUTO-ENTMCNC: 33.3 % (ref 33–36.5)
MCV RBC AUTO: 93.5 FL (ref 78–98)
MONOCYTES # BLD AUTO: 0.2 X10'3 (ref 0–0.9)
MONOCYTES NFR BLD AUTO: 0.8 % (ref 2–12)
NEUTROPHILS # BLD AUTO: 3.8 X10'3 (ref 1.8–7.7)
NEUTROPHILS NFR BLD AUTO: 18.9 % (ref 42–75)
PLATELET # BLD AUTO: 123 X10'3 (ref 140–440)
PMV BLD AUTO: 8.7 FL (ref 7.4–10.4)
POTASSIUM SERPL-SCNC: 4.2 MMOL/L (ref 3.5–5.1)
RBC # BLD AUTO: 3.16 X10'6 (ref 4.7–6.1)
SODIUM SERPL-SCNC: 141 MMOL/L (ref 135–145)
WBC # BLD AUTO: 20.1 X10'3 (ref 4.5–11)

## 2018-04-06 RX ADMIN — HYDROCODONE BITARTRATE AND ACETAMINOPHEN PRN TAB: 5; 325 TABLET ORAL at 19:17

## 2018-04-06 RX ADMIN — INSULIN GLARGINE SCH UNIT: 100 INJECTION, SOLUTION SUBCUTANEOUS at 21:00

## 2018-04-06 RX ADMIN — HEPARIN SODIUM SCH UNIT: 5000 INJECTION, SOLUTION INTRAVENOUS; SUBCUTANEOUS at 20:11

## 2018-04-06 RX ADMIN — IPRATROPIUM BROMIDE AND ALBUTEROL SULFATE SCH ML: .5; 3 SOLUTION RESPIRATORY (INHALATION) at 10:15

## 2018-04-06 RX ADMIN — ONDANSETRON PRN MG: 2 INJECTION, SOLUTION INTRAMUSCULAR; INTRAVENOUS at 07:18

## 2018-04-06 RX ADMIN — Medication SCH MG: at 07:18

## 2018-04-06 RX ADMIN — IPRATROPIUM BROMIDE AND ALBUTEROL SULFATE SCH ML: .5; 3 SOLUTION RESPIRATORY (INHALATION) at 23:26

## 2018-04-06 RX ADMIN — HYDROCODONE BITARTRATE AND ACETAMINOPHEN PRN TAB: 5; 325 TABLET ORAL at 15:05

## 2018-04-06 RX ADMIN — BUDESONIDE SCH MG: 0.5 INHALANT RESPIRATORY (INHALATION) at 07:08

## 2018-04-06 RX ADMIN — HYDROCODONE BITARTRATE AND ACETAMINOPHEN PRN TAB: 5; 325 TABLET ORAL at 02:58

## 2018-04-06 RX ADMIN — IPRATROPIUM BROMIDE AND ALBUTEROL SULFATE SCH ML: .5; 3 SOLUTION RESPIRATORY (INHALATION) at 14:52

## 2018-04-06 RX ADMIN — BUDESONIDE SCH MG: 0.5 INHALANT RESPIRATORY (INHALATION) at 19:24

## 2018-04-06 RX ADMIN — Medication SCH MMU: at 07:18

## 2018-04-06 RX ADMIN — IPRATROPIUM BROMIDE AND ALBUTEROL SULFATE SCH ML: .5; 3 SOLUTION RESPIRATORY (INHALATION) at 07:08

## 2018-04-06 RX ADMIN — ONDANSETRON PRN MG: 2 INJECTION, SOLUTION INTRAMUSCULAR; INTRAVENOUS at 17:09

## 2018-04-06 RX ADMIN — HYDROCODONE BITARTRATE AND ACETAMINOPHEN PRN TAB: 5; 325 TABLET ORAL at 23:13

## 2018-04-06 RX ADMIN — GUAIFENESIN SCH MG: 600 TABLET, EXTENDED RELEASE ORAL at 07:18

## 2018-04-06 RX ADMIN — HYDROCODONE BITARTRATE AND ACETAMINOPHEN PRN TAB: 5; 325 TABLET ORAL at 10:30

## 2018-04-06 RX ADMIN — Medication SCH MMU: at 20:07

## 2018-04-06 RX ADMIN — PANTOPRAZOLE SODIUM SCH MG: 40 TABLET, DELAYED RELEASE ORAL at 07:18

## 2018-04-06 RX ADMIN — METOCLOPRAMIDE PRN MG: 5 INJECTION, SOLUTION INTRAMUSCULAR; INTRAVENOUS at 12:27

## 2018-04-06 RX ADMIN — HEPARIN SODIUM SCH UNIT: 5000 INJECTION, SOLUTION INTRAVENOUS; SUBCUTANEOUS at 07:17

## 2018-04-06 RX ADMIN — IPRATROPIUM BROMIDE AND ALBUTEROL SULFATE SCH ML: .5; 3 SOLUTION RESPIRATORY (INHALATION) at 03:10

## 2018-04-06 RX ADMIN — GUAIFENESIN SCH MG: 600 TABLET, EXTENDED RELEASE ORAL at 20:09

## 2018-04-06 RX ADMIN — IPRATROPIUM BROMIDE AND ALBUTEROL SULFATE SCH ML: .5; 3 SOLUTION RESPIRATORY (INHALATION) at 19:24

## 2018-04-07 VITALS — DIASTOLIC BLOOD PRESSURE: 61 MMHG | SYSTOLIC BLOOD PRESSURE: 126 MMHG

## 2018-04-07 VITALS — SYSTOLIC BLOOD PRESSURE: 114 MMHG | DIASTOLIC BLOOD PRESSURE: 74 MMHG

## 2018-04-07 VITALS — SYSTOLIC BLOOD PRESSURE: 98 MMHG | DIASTOLIC BLOOD PRESSURE: 70 MMHG

## 2018-04-07 VITALS — SYSTOLIC BLOOD PRESSURE: 102 MMHG | DIASTOLIC BLOOD PRESSURE: 61 MMHG

## 2018-04-07 VITALS — DIASTOLIC BLOOD PRESSURE: 76 MMHG | SYSTOLIC BLOOD PRESSURE: 113 MMHG

## 2018-04-07 VITALS — SYSTOLIC BLOOD PRESSURE: 111 MMHG | DIASTOLIC BLOOD PRESSURE: 70 MMHG

## 2018-04-07 VITALS — DIASTOLIC BLOOD PRESSURE: 64 MMHG | SYSTOLIC BLOOD PRESSURE: 107 MMHG

## 2018-04-07 LAB
ALBUMIN SERPL BCP-MCNC: 3.2 G/DL (ref 3.4–5)
ANION GAP SERPL CALCULATED.3IONS-SCNC: 8 MMOL/L (ref 8–16)
ANISOCYTOSIS BLD QL SMEAR: (no result)
BASOPHILS # BLD AUTO: 0 X10'3 (ref 0–0.2)
BASOPHILS NFR BLD AUTO: 0.4 % (ref 0–1)
BUN SERPL-MCNC: 21 MG/DL (ref 7–18)
BUN/CREAT SERPL: 17.2 (ref 5.4–32)
CALCIUM SERPL-MCNC: 8.7 MG/DL (ref 8.5–10.1)
CHLORIDE SERPL-SCNC: 106 MMOL/L (ref 99–107)
CO2 SERPL-SCNC: 27 MMOL/L (ref 24–32)
CREAT SERPL-MCNC: 1.22 MG/DL (ref 0.6–1.1)
EOSINOPHIL # BLD AUTO: 0.1 X10'3 (ref 0–0.9)
EOSINOPHIL NFR BLD AUTO: 1.5 % (ref 0–6)
EOSINOPHIL NFR BLD MANUAL: 2 % (ref 0–6)
ERYTHROCYTE [DISTWIDTH] IN BLOOD BY AUTOMATED COUNT: 16 % (ref 11.5–14.5)
GFR SERPL CREATININE-BSD FRML MDRD: 59 ML/MIN
GLUCOSE SERPL-MCNC: 96 MG/DL (ref 70–104)
HCT VFR BLD AUTO: 25.8 % (ref 42–52)
HGB BLD-MCNC: 8.6 G/DL (ref 14–17.9)
HYPOCHROMIA BLD QL SMEAR: (no result)
LYMPHOCYTES # BLD AUTO: 7.6 X10'3 (ref 1.1–4.8)
LYMPHOCYTES NFR BLD AUTO: 77.5 % (ref 21–51)
LYMPHOCYTES NFR BLD MANUAL: 76 % (ref 21–51)
MAGNESIUM SERPL-MCNC: 1.7 MG/DL (ref 1.5–2.4)
MCH RBC QN AUTO: 31.4 PG (ref 27–31)
MCHC RBC AUTO-ENTMCNC: 33.5 % (ref 33–36.5)
MCV RBC AUTO: 93.8 FL (ref 78–98)
MONOCYTES # BLD AUTO: 0.1 X10'3 (ref 0–0.9)
MONOCYTES NFR BLD AUTO: 0.7 % (ref 2–12)
MONOCYTES NFR BLD MANUAL: 1 % (ref 2–12)
NEUTROPHILS # BLD AUTO: 1.9 X10'3 (ref 1.8–7.7)
NEUTROPHILS NFR BLD AUTO: 19.9 % (ref 42–75)
NEUTS BAND # BLD MANUAL: 1 % (ref 0–10)
NEUTS SEG NFR BLD MANUAL: 18 % (ref 42–75)
PLATELET # BLD AUTO: 90 X10'3 (ref 140–440)
PLATELET BLD QL SMEAR: (no result)
PMV BLD AUTO: 8.6 FL (ref 7.4–10.4)
POLYCHROMASIA BLD QL SMEAR: (no result)
POTASSIUM SERPL-SCNC: 4.1 MMOL/L (ref 3.5–5.1)
RBC # BLD AUTO: 2.75 X10'6 (ref 4.7–6.1)
RBC MORPH BLD: (no result)
SMUDGE CELLS BLD QL SMEAR: (no result)
SODIUM SERPL-SCNC: 141 MMOL/L (ref 135–145)
TOTAL CELLS COUNTED FLD: 100
VARIANT LYMPHS NFR BLD MANUAL: 2 % (ref 0–0)
WBC # BLD AUTO: 9.8 X10'3 (ref 4.5–11)

## 2018-04-07 RX ADMIN — IPRATROPIUM BROMIDE AND ALBUTEROL SULFATE SCH ML: .5; 3 SOLUTION RESPIRATORY (INHALATION) at 08:16

## 2018-04-07 RX ADMIN — HYDROCODONE BITARTRATE AND ACETAMINOPHEN PRN TAB: 5; 325 TABLET ORAL at 23:22

## 2018-04-07 RX ADMIN — Medication SCH MMU: at 07:23

## 2018-04-07 RX ADMIN — INSULIN GLARGINE SCH UNIT: 100 INJECTION, SOLUTION SUBCUTANEOUS at 21:00

## 2018-04-07 RX ADMIN — IPRATROPIUM BROMIDE AND ALBUTEROL SULFATE SCH ML: .5; 3 SOLUTION RESPIRATORY (INHALATION) at 11:58

## 2018-04-07 RX ADMIN — Medication SCH MG: at 07:23

## 2018-04-07 RX ADMIN — HYDROCODONE BITARTRATE AND ACETAMINOPHEN PRN TAB: 5; 325 TABLET ORAL at 16:35

## 2018-04-07 RX ADMIN — Medication SCH MMU: at 20:21

## 2018-04-07 RX ADMIN — ONDANSETRON PRN MG: 2 INJECTION, SOLUTION INTRAMUSCULAR; INTRAVENOUS at 17:08

## 2018-04-07 RX ADMIN — BUDESONIDE SCH MG: 0.5 INHALANT RESPIRATORY (INHALATION) at 08:17

## 2018-04-07 RX ADMIN — METOCLOPRAMIDE PRN MG: 5 INJECTION, SOLUTION INTRAMUSCULAR; INTRAVENOUS at 12:17

## 2018-04-07 RX ADMIN — GUAIFENESIN SCH MG: 600 TABLET, EXTENDED RELEASE ORAL at 07:23

## 2018-04-07 RX ADMIN — HYDROCODONE BITARTRATE AND ACETAMINOPHEN PRN TAB: 5; 325 TABLET ORAL at 07:15

## 2018-04-07 RX ADMIN — GUAIFENESIN SCH MG: 600 TABLET, EXTENDED RELEASE ORAL at 20:21

## 2018-04-07 RX ADMIN — HEPARIN SODIUM SCH UNIT: 5000 INJECTION, SOLUTION INTRAVENOUS; SUBCUTANEOUS at 20:23

## 2018-04-07 RX ADMIN — IPRATROPIUM BROMIDE AND ALBUTEROL SULFATE SCH ML: .5; 3 SOLUTION RESPIRATORY (INHALATION) at 23:02

## 2018-04-07 RX ADMIN — IPRATROPIUM BROMIDE AND ALBUTEROL SULFATE SCH ML: .5; 3 SOLUTION RESPIRATORY (INHALATION) at 15:53

## 2018-04-07 RX ADMIN — IPRATROPIUM BROMIDE AND ALBUTEROL SULFATE SCH ML: .5; 3 SOLUTION RESPIRATORY (INHALATION) at 02:47

## 2018-04-07 RX ADMIN — HEPARIN SODIUM SCH UNIT: 5000 INJECTION, SOLUTION INTRAVENOUS; SUBCUTANEOUS at 07:25

## 2018-04-07 RX ADMIN — IPRATROPIUM BROMIDE AND ALBUTEROL SULFATE SCH ML: .5; 3 SOLUTION RESPIRATORY (INHALATION) at 19:15

## 2018-04-07 RX ADMIN — HYDROCODONE BITARTRATE AND ACETAMINOPHEN PRN TAB: 5; 325 TABLET ORAL at 12:18

## 2018-04-07 RX ADMIN — ONDANSETRON PRN MG: 2 INJECTION, SOLUTION INTRAMUSCULAR; INTRAVENOUS at 07:16

## 2018-04-07 RX ADMIN — PANTOPRAZOLE SODIUM SCH MG: 40 TABLET, DELAYED RELEASE ORAL at 07:23

## 2018-04-07 RX ADMIN — BUDESONIDE SCH MG: 0.5 INHALANT RESPIRATORY (INHALATION) at 19:15

## 2018-04-08 VITALS — SYSTOLIC BLOOD PRESSURE: 108 MMHG | DIASTOLIC BLOOD PRESSURE: 62 MMHG

## 2018-04-08 VITALS — DIASTOLIC BLOOD PRESSURE: 70 MMHG | SYSTOLIC BLOOD PRESSURE: 117 MMHG

## 2018-04-08 VITALS — SYSTOLIC BLOOD PRESSURE: 103 MMHG | DIASTOLIC BLOOD PRESSURE: 66 MMHG

## 2018-04-08 VITALS — SYSTOLIC BLOOD PRESSURE: 104 MMHG | DIASTOLIC BLOOD PRESSURE: 67 MMHG

## 2018-04-08 VITALS — SYSTOLIC BLOOD PRESSURE: 122 MMHG | DIASTOLIC BLOOD PRESSURE: 68 MMHG

## 2018-04-08 VITALS — DIASTOLIC BLOOD PRESSURE: 66 MMHG | SYSTOLIC BLOOD PRESSURE: 115 MMHG

## 2018-04-08 VITALS — DIASTOLIC BLOOD PRESSURE: 65 MMHG | SYSTOLIC BLOOD PRESSURE: 105 MMHG

## 2018-04-08 RX ADMIN — HEPARIN SODIUM SCH UNIT: 5000 INJECTION, SOLUTION INTRAVENOUS; SUBCUTANEOUS at 20:00

## 2018-04-08 RX ADMIN — ONDANSETRON PRN MG: 2 INJECTION, SOLUTION INTRAMUSCULAR; INTRAVENOUS at 07:32

## 2018-04-08 RX ADMIN — GUAIFENESIN SCH MG: 600 TABLET, EXTENDED RELEASE ORAL at 21:30

## 2018-04-08 RX ADMIN — BUDESONIDE SCH MG: 0.5 INHALANT RESPIRATORY (INHALATION) at 08:30

## 2018-04-08 RX ADMIN — HYDROCODONE BITARTRATE AND ACETAMINOPHEN PRN TAB: 5; 325 TABLET ORAL at 16:17

## 2018-04-08 RX ADMIN — HYDROCODONE BITARTRATE AND ACETAMINOPHEN PRN TAB: 5; 325 TABLET ORAL at 21:43

## 2018-04-08 RX ADMIN — INSULIN GLARGINE SCH UNIT: 100 INJECTION, SOLUTION SUBCUTANEOUS at 21:00

## 2018-04-08 RX ADMIN — IPRATROPIUM BROMIDE AND ALBUTEROL SULFATE SCH ML: .5; 3 SOLUTION RESPIRATORY (INHALATION) at 16:22

## 2018-04-08 RX ADMIN — HYDROCODONE BITARTRATE AND ACETAMINOPHEN PRN TAB: 5; 325 TABLET ORAL at 12:15

## 2018-04-08 RX ADMIN — ONDANSETRON PRN MG: 2 INJECTION, SOLUTION INTRAMUSCULAR; INTRAVENOUS at 17:08

## 2018-04-08 RX ADMIN — IPRATROPIUM BROMIDE AND ALBUTEROL SULFATE SCH ML: .5; 3 SOLUTION RESPIRATORY (INHALATION) at 23:21

## 2018-04-08 RX ADMIN — PANTOPRAZOLE SODIUM SCH MG: 40 TABLET, DELAYED RELEASE ORAL at 07:12

## 2018-04-08 RX ADMIN — Medication SCH MMU: at 21:44

## 2018-04-08 RX ADMIN — IPRATROPIUM BROMIDE AND ALBUTEROL SULFATE SCH ML: .5; 3 SOLUTION RESPIRATORY (INHALATION) at 08:30

## 2018-04-08 RX ADMIN — IPRATROPIUM BROMIDE AND ALBUTEROL SULFATE SCH ML: .5; 3 SOLUTION RESPIRATORY (INHALATION) at 12:18

## 2018-04-08 RX ADMIN — Medication SCH MMU: at 07:12

## 2018-04-08 RX ADMIN — Medication SCH MG: at 07:12

## 2018-04-08 RX ADMIN — IPRATROPIUM BROMIDE AND ALBUTEROL SULFATE SCH ML: .5; 3 SOLUTION RESPIRATORY (INHALATION) at 20:22

## 2018-04-08 RX ADMIN — IPRATROPIUM BROMIDE AND ALBUTEROL SULFATE SCH ML: .5; 3 SOLUTION RESPIRATORY (INHALATION) at 02:45

## 2018-04-08 RX ADMIN — HEPARIN SODIUM SCH UNIT: 5000 INJECTION, SOLUTION INTRAVENOUS; SUBCUTANEOUS at 08:40

## 2018-04-08 RX ADMIN — METOCLOPRAMIDE PRN MG: 5 INJECTION, SOLUTION INTRAMUSCULAR; INTRAVENOUS at 12:13

## 2018-04-08 RX ADMIN — BUDESONIDE SCH MG: 0.5 INHALANT RESPIRATORY (INHALATION) at 20:22

## 2018-04-08 RX ADMIN — GUAIFENESIN SCH MG: 600 TABLET, EXTENDED RELEASE ORAL at 07:12

## 2018-04-09 VITALS — SYSTOLIC BLOOD PRESSURE: 102 MMHG | DIASTOLIC BLOOD PRESSURE: 63 MMHG

## 2018-04-09 VITALS — SYSTOLIC BLOOD PRESSURE: 94 MMHG | DIASTOLIC BLOOD PRESSURE: 61 MMHG

## 2018-04-09 VITALS — SYSTOLIC BLOOD PRESSURE: 115 MMHG | DIASTOLIC BLOOD PRESSURE: 69 MMHG

## 2018-04-09 VITALS — SYSTOLIC BLOOD PRESSURE: 103 MMHG | DIASTOLIC BLOOD PRESSURE: 75 MMHG

## 2018-04-09 VITALS — DIASTOLIC BLOOD PRESSURE: 65 MMHG | SYSTOLIC BLOOD PRESSURE: 101 MMHG

## 2018-04-09 VITALS — DIASTOLIC BLOOD PRESSURE: 65 MMHG | SYSTOLIC BLOOD PRESSURE: 111 MMHG

## 2018-04-09 VITALS — DIASTOLIC BLOOD PRESSURE: 63 MMHG | SYSTOLIC BLOOD PRESSURE: 99 MMHG

## 2018-04-09 VITALS — DIASTOLIC BLOOD PRESSURE: 69 MMHG | SYSTOLIC BLOOD PRESSURE: 111 MMHG

## 2018-04-09 VITALS — DIASTOLIC BLOOD PRESSURE: 57 MMHG | SYSTOLIC BLOOD PRESSURE: 91 MMHG

## 2018-04-09 LAB
ALBUMIN SERPL BCP-MCNC: 3.1 G/DL (ref 3.4–5)
ALBUMIN/GLOB SERPL: 1.1 {RATIO} (ref 1.1–1.5)
ALP SERPL-CCNC: 69 IU/L (ref 46–116)
ALT SERPL W P-5'-P-CCNC: 27 U/L (ref 12–78)
ANION GAP SERPL CALCULATED.3IONS-SCNC: 6 MMOL/L (ref 8–16)
AST SERPL W P-5'-P-CCNC: 12 U/L (ref 10–37)
BASOPHILS # BLD AUTO: 0 X10'3 (ref 0–0.2)
BASOPHILS NFR BLD AUTO: 0.3 % (ref 0–1)
BILIRUB SERPL-MCNC: 0.5 MG/DL (ref 0.1–1)
BUN SERPL-MCNC: 15 MG/DL (ref 7–18)
BUN/CREAT SERPL: 10.9 (ref 5.4–32)
CALCIUM SERPL-MCNC: 8.7 MG/DL (ref 8.5–10.1)
CHLORIDE SERPL-SCNC: 105 MMOL/L (ref 99–107)
CO2 SERPL-SCNC: 31.2 MMOL/L (ref 24–32)
CREAT SERPL-MCNC: 1.38 MG/DL (ref 0.6–1.1)
EOSINOPHIL # BLD AUTO: 0.1 X10'3 (ref 0–0.9)
EOSINOPHIL NFR BLD AUTO: 1.4 % (ref 0–6)
ERYTHROCYTE [DISTWIDTH] IN BLOOD BY AUTOMATED COUNT: 14.4 % (ref 11.5–14.5)
GFR SERPL CREATININE-BSD FRML MDRD: 52 ML/MIN
GLUCOSE SERPL-MCNC: 101 MG/DL (ref 70–104)
HCT VFR BLD AUTO: 26.5 % (ref 42–52)
HGB BLD-MCNC: 8.8 G/DL (ref 14–17.9)
LYMPHOCYTES # BLD AUTO: 6.3 X10'3 (ref 1.1–4.8)
LYMPHOCYTES NFR BLD AUTO: 71.4 % (ref 21–51)
MAGNESIUM SERPL-MCNC: 1.6 MG/DL (ref 1.5–2.4)
MCH RBC QN AUTO: 30.5 PG (ref 27–31)
MCHC RBC AUTO-ENTMCNC: 33.2 % (ref 33–36.5)
MCV RBC AUTO: 92.1 FL (ref 78–98)
MONOCYTES # BLD AUTO: 0.3 X10'3 (ref 0–0.9)
MONOCYTES NFR BLD AUTO: 3.5 % (ref 2–12)
NEUTROPHILS # BLD AUTO: 2 X10'3 (ref 1.8–7.7)
NEUTROPHILS NFR BLD AUTO: 23.4 % (ref 42–75)
PLATELET # BLD AUTO: 115 X10'3 (ref 140–440)
PMV BLD AUTO: 8.3 FL (ref 7.4–10.4)
POTASSIUM SERPL-SCNC: 4.3 MMOL/L (ref 3.5–5.1)
PROT SERPL-MCNC: 6 G/DL (ref 6.4–8.2)
RBC # BLD AUTO: 2.88 X10'6 (ref 4.7–6.1)
SODIUM SERPL-SCNC: 142 MMOL/L (ref 135–145)
WBC # BLD AUTO: 8.7 X10'3 (ref 4.5–11)

## 2018-04-09 RX ADMIN — HYDROCODONE BITARTRATE AND ACETAMINOPHEN PRN TAB: 5; 325 TABLET ORAL at 12:03

## 2018-04-09 RX ADMIN — HEPARIN SODIUM SCH UNIT: 5000 INJECTION, SOLUTION INTRAVENOUS; SUBCUTANEOUS at 20:44

## 2018-04-09 RX ADMIN — INSULIN GLARGINE SCH UNIT: 100 INJECTION, SOLUTION SUBCUTANEOUS at 21:00

## 2018-04-09 RX ADMIN — IPRATROPIUM BROMIDE AND ALBUTEROL SULFATE SCH ML: .5; 3 SOLUTION RESPIRATORY (INHALATION) at 22:17

## 2018-04-09 RX ADMIN — IPRATROPIUM BROMIDE AND ALBUTEROL SULFATE SCH ML: .5; 3 SOLUTION RESPIRATORY (INHALATION) at 03:00

## 2018-04-09 RX ADMIN — IPRATROPIUM BROMIDE AND ALBUTEROL SULFATE SCH ML: .5; 3 SOLUTION RESPIRATORY (INHALATION) at 18:43

## 2018-04-09 RX ADMIN — Medication SCH MMU: at 20:45

## 2018-04-09 RX ADMIN — METOCLOPRAMIDE PRN MG: 5 INJECTION, SOLUTION INTRAMUSCULAR; INTRAVENOUS at 11:59

## 2018-04-09 RX ADMIN — IPRATROPIUM BROMIDE AND ALBUTEROL SULFATE SCH ML: .5; 3 SOLUTION RESPIRATORY (INHALATION) at 07:11

## 2018-04-09 RX ADMIN — ONDANSETRON PRN MG: 2 INJECTION, SOLUTION INTRAMUSCULAR; INTRAVENOUS at 17:02

## 2018-04-09 RX ADMIN — HYDROCODONE BITARTRATE AND ACETAMINOPHEN PRN TAB: 5; 325 TABLET ORAL at 17:03

## 2018-04-09 RX ADMIN — HYDROCODONE BITARTRATE AND ACETAMINOPHEN PRN TAB: 5; 325 TABLET ORAL at 20:51

## 2018-04-09 RX ADMIN — GUAIFENESIN SCH MG: 600 TABLET, EXTENDED RELEASE ORAL at 07:14

## 2018-04-09 RX ADMIN — BUDESONIDE SCH MG: 0.5 INHALANT RESPIRATORY (INHALATION) at 07:11

## 2018-04-09 RX ADMIN — IPRATROPIUM BROMIDE AND ALBUTEROL SULFATE SCH ML: .5; 3 SOLUTION RESPIRATORY (INHALATION) at 15:20

## 2018-04-09 RX ADMIN — BUDESONIDE SCH MG: 0.5 INHALANT RESPIRATORY (INHALATION) at 18:52

## 2018-04-09 RX ADMIN — ONDANSETRON PRN MG: 2 INJECTION, SOLUTION INTRAMUSCULAR; INTRAVENOUS at 07:16

## 2018-04-09 RX ADMIN — PANTOPRAZOLE SODIUM SCH MG: 40 TABLET, DELAYED RELEASE ORAL at 07:14

## 2018-04-09 RX ADMIN — HEPARIN SODIUM SCH UNIT: 5000 INJECTION, SOLUTION INTRAVENOUS; SUBCUTANEOUS at 08:51

## 2018-04-09 RX ADMIN — HYDROCODONE BITARTRATE AND ACETAMINOPHEN PRN TAB: 5; 325 TABLET ORAL at 07:16

## 2018-04-09 RX ADMIN — Medication SCH MG: at 07:14

## 2018-04-09 RX ADMIN — IPRATROPIUM BROMIDE AND ALBUTEROL SULFATE SCH ML: .5; 3 SOLUTION RESPIRATORY (INHALATION) at 11:08

## 2018-04-09 RX ADMIN — GUAIFENESIN SCH MG: 600 TABLET, EXTENDED RELEASE ORAL at 20:45

## 2018-04-09 RX ADMIN — Medication SCH MMU: at 07:13

## 2018-04-10 VITALS — SYSTOLIC BLOOD PRESSURE: 103 MMHG | DIASTOLIC BLOOD PRESSURE: 64 MMHG

## 2018-04-10 VITALS — SYSTOLIC BLOOD PRESSURE: 92 MMHG | DIASTOLIC BLOOD PRESSURE: 55 MMHG

## 2018-04-10 VITALS — DIASTOLIC BLOOD PRESSURE: 50 MMHG | SYSTOLIC BLOOD PRESSURE: 107 MMHG

## 2018-04-10 VITALS — DIASTOLIC BLOOD PRESSURE: 74 MMHG | SYSTOLIC BLOOD PRESSURE: 109 MMHG

## 2018-04-10 VITALS — SYSTOLIC BLOOD PRESSURE: 102 MMHG | DIASTOLIC BLOOD PRESSURE: 68 MMHG

## 2018-04-10 VITALS — DIASTOLIC BLOOD PRESSURE: 52 MMHG | SYSTOLIC BLOOD PRESSURE: 97 MMHG

## 2018-04-10 VITALS — SYSTOLIC BLOOD PRESSURE: 95 MMHG | DIASTOLIC BLOOD PRESSURE: 62 MMHG

## 2018-04-10 LAB
ALBUMIN SERPL BCP-MCNC: 3.2 G/DL (ref 3.4–5)
ALBUMIN/GLOB SERPL: 1.1 {RATIO} (ref 1.1–1.5)
ALP SERPL-CCNC: 69 IU/L (ref 46–116)
ALT SERPL W P-5'-P-CCNC: 28 U/L (ref 12–78)
ANION GAP SERPL CALCULATED.3IONS-SCNC: 6 MMOL/L (ref 8–16)
AST SERPL W P-5'-P-CCNC: 12 U/L (ref 10–37)
BASOPHILS # BLD AUTO: 0 X10'3 (ref 0–0.2)
BASOPHILS NFR BLD AUTO: 0.4 % (ref 0–1)
BILIRUB SERPL-MCNC: 0.4 MG/DL (ref 0.1–1)
BUN SERPL-MCNC: 15 MG/DL (ref 7–18)
BUN/CREAT SERPL: 11.5 (ref 5.4–32)
CALCIUM SERPL-MCNC: 8.8 MG/DL (ref 8.5–10.1)
CHLORIDE SERPL-SCNC: 104 MMOL/L (ref 99–107)
CO2 SERPL-SCNC: 32.1 MMOL/L (ref 24–32)
CREAT SERPL-MCNC: 1.3 MG/DL (ref 0.6–1.1)
EOSINOPHIL # BLD AUTO: 0.2 X10'3 (ref 0–0.9)
EOSINOPHIL NFR BLD AUTO: 2 % (ref 0–6)
ERYTHROCYTE [DISTWIDTH] IN BLOOD BY AUTOMATED COUNT: 15.3 % (ref 11.5–14.5)
GFR SERPL CREATININE-BSD FRML MDRD: 55 ML/MIN
GLUCOSE SERPL-MCNC: 96 MG/DL (ref 70–104)
HCT VFR BLD AUTO: 26.2 % (ref 42–52)
HGB BLD-MCNC: 8.8 G/DL (ref 14–17.9)
LYMPHOCYTES # BLD AUTO: 5.6 X10'3 (ref 1.1–4.8)
LYMPHOCYTES NFR BLD AUTO: 72.4 % (ref 21–51)
MAGNESIUM SERPL-MCNC: 1.6 MG/DL (ref 1.5–2.4)
MCH RBC QN AUTO: 31.1 PG (ref 27–31)
MCHC RBC AUTO-ENTMCNC: 33.5 % (ref 33–36.5)
MCV RBC AUTO: 93 FL (ref 78–98)
MONOCYTES # BLD AUTO: 0.1 X10'3 (ref 0–0.9)
MONOCYTES NFR BLD AUTO: 0.8 % (ref 2–12)
NEUTROPHILS # BLD AUTO: 1.9 X10'3 (ref 1.8–7.7)
NEUTROPHILS NFR BLD AUTO: 24.4 % (ref 42–75)
PLATELET # BLD AUTO: 108 X10'3 (ref 140–440)
PMV BLD AUTO: 8.2 FL (ref 7.4–10.4)
POTASSIUM SERPL-SCNC: 3.9 MMOL/L (ref 3.5–5.1)
PROT SERPL-MCNC: 6.1 G/DL (ref 6.4–8.2)
RBC # BLD AUTO: 2.81 X10'6 (ref 4.7–6.1)
SODIUM SERPL-SCNC: 142 MMOL/L (ref 135–145)
WBC # BLD AUTO: 7.7 X10'3 (ref 4.5–11)

## 2018-04-10 RX ADMIN — IPRATROPIUM BROMIDE AND ALBUTEROL SULFATE SCH ML: .5; 3 SOLUTION RESPIRATORY (INHALATION) at 19:27

## 2018-04-10 RX ADMIN — HEPARIN SODIUM SCH UNIT: 5000 INJECTION, SOLUTION INTRAVENOUS; SUBCUTANEOUS at 07:27

## 2018-04-10 RX ADMIN — METOCLOPRAMIDE PRN MG: 5 INJECTION, SOLUTION INTRAMUSCULAR; INTRAVENOUS at 12:18

## 2018-04-10 RX ADMIN — IPRATROPIUM BROMIDE AND ALBUTEROL SULFATE SCH ML: .5; 3 SOLUTION RESPIRATORY (INHALATION) at 22:55

## 2018-04-10 RX ADMIN — BUDESONIDE SCH MG: 0.5 INHALANT RESPIRATORY (INHALATION) at 19:28

## 2018-04-10 RX ADMIN — ONDANSETRON PRN MG: 2 INJECTION, SOLUTION INTRAMUSCULAR; INTRAVENOUS at 07:23

## 2018-04-10 RX ADMIN — IPRATROPIUM BROMIDE AND ALBUTEROL SULFATE SCH ML: .5; 3 SOLUTION RESPIRATORY (INHALATION) at 02:27

## 2018-04-10 RX ADMIN — GUAIFENESIN SCH MG: 600 TABLET, EXTENDED RELEASE ORAL at 07:28

## 2018-04-10 RX ADMIN — IPRATROPIUM BROMIDE AND ALBUTEROL SULFATE SCH ML: .5; 3 SOLUTION RESPIRATORY (INHALATION) at 11:12

## 2018-04-10 RX ADMIN — PANTOPRAZOLE SODIUM SCH MG: 40 TABLET, DELAYED RELEASE ORAL at 07:28

## 2018-04-10 RX ADMIN — Medication SCH MMU: at 07:28

## 2018-04-10 RX ADMIN — Medication SCH MMU: at 20:44

## 2018-04-10 RX ADMIN — GUAIFENESIN SCH MG: 600 TABLET, EXTENDED RELEASE ORAL at 20:45

## 2018-04-10 RX ADMIN — HYDROCODONE BITARTRATE AND ACETAMINOPHEN PRN TAB: 5; 325 TABLET ORAL at 13:30

## 2018-04-10 RX ADMIN — INSULIN GLARGINE SCH UNIT: 100 INJECTION, SOLUTION SUBCUTANEOUS at 20:31

## 2018-04-10 RX ADMIN — HEPARIN SODIUM SCH UNIT: 5000 INJECTION, SOLUTION INTRAVENOUS; SUBCUTANEOUS at 20:48

## 2018-04-10 RX ADMIN — BUDESONIDE SCH MG: 0.5 INHALANT RESPIRATORY (INHALATION) at 07:22

## 2018-04-10 RX ADMIN — HYDROCODONE BITARTRATE AND ACETAMINOPHEN PRN TAB: 5; 325 TABLET ORAL at 04:34

## 2018-04-10 RX ADMIN — HYDROCODONE BITARTRATE AND ACETAMINOPHEN PRN TAB: 5; 325 TABLET ORAL at 08:47

## 2018-04-10 RX ADMIN — IPRATROPIUM BROMIDE AND ALBUTEROL SULFATE SCH ML: .5; 3 SOLUTION RESPIRATORY (INHALATION) at 07:22

## 2018-04-10 RX ADMIN — ONDANSETRON PRN MG: 2 INJECTION, SOLUTION INTRAMUSCULAR; INTRAVENOUS at 17:21

## 2018-04-10 RX ADMIN — IPRATROPIUM BROMIDE AND ALBUTEROL SULFATE SCH ML: .5; 3 SOLUTION RESPIRATORY (INHALATION) at 14:52

## 2018-04-10 RX ADMIN — Medication SCH MG: at 07:28

## 2018-04-10 RX ADMIN — HYDROCODONE BITARTRATE AND ACETAMINOPHEN PRN TAB: 5; 325 TABLET ORAL at 22:16

## 2018-04-11 VITALS — DIASTOLIC BLOOD PRESSURE: 65 MMHG | SYSTOLIC BLOOD PRESSURE: 105 MMHG

## 2018-04-11 VITALS — DIASTOLIC BLOOD PRESSURE: 72 MMHG | SYSTOLIC BLOOD PRESSURE: 101 MMHG

## 2018-04-11 VITALS — SYSTOLIC BLOOD PRESSURE: 94 MMHG | DIASTOLIC BLOOD PRESSURE: 52 MMHG

## 2018-04-11 VITALS — SYSTOLIC BLOOD PRESSURE: 95 MMHG | DIASTOLIC BLOOD PRESSURE: 63 MMHG

## 2018-04-11 VITALS — SYSTOLIC BLOOD PRESSURE: 93 MMHG | DIASTOLIC BLOOD PRESSURE: 63 MMHG

## 2018-04-11 VITALS — SYSTOLIC BLOOD PRESSURE: 89 MMHG | DIASTOLIC BLOOD PRESSURE: 54 MMHG

## 2018-04-11 VITALS — SYSTOLIC BLOOD PRESSURE: 105 MMHG | DIASTOLIC BLOOD PRESSURE: 71 MMHG

## 2018-04-11 VITALS — SYSTOLIC BLOOD PRESSURE: 107 MMHG | DIASTOLIC BLOOD PRESSURE: 65 MMHG

## 2018-04-11 LAB
ALBUMIN SERPL BCP-MCNC: 2.1 G/DL (ref 3.4–5)
ALBUMIN/GLOB SERPL: 0.5 {RATIO} (ref 1.1–1.5)
ALP SERPL-CCNC: 69 IU/L (ref 46–116)
ALT SERPL W P-5'-P-CCNC: 25 U/L (ref 12–78)
ANION GAP SERPL CALCULATED.3IONS-SCNC: 5 MMOL/L (ref 8–16)
AST SERPL W P-5'-P-CCNC: 12 U/L (ref 10–37)
BASE EXCESS BLDA CALC-SCNC: 4.1 MMOL/L (ref -2–3)
BASOPHILS # BLD AUTO: 0 X10'3 (ref 0–0.2)
BASOPHILS NFR BLD AUTO: 0.1 % (ref 0–1)
BILIRUB SERPL-MCNC: 0.4 MG/DL (ref 0.1–1)
BODY TEMPERATURE: 36.6
BUN SERPL-MCNC: 17 MG/DL (ref 7–18)
BUN/CREAT SERPL: 13.8 (ref 5.4–32)
CALCIUM SERPL-MCNC: 8.8 MG/DL (ref 8.5–10.1)
CHLORIDE SERPL-SCNC: 104 MMOL/L (ref 99–107)
CO2 SERPL-SCNC: 32.5 MMOL/L (ref 24–32)
COHGB MFR BLDA: 0.2 % (ref 0.5–1.5)
CREAT SERPL-MCNC: 1.23 MG/DL (ref 0.6–1.1)
EOSINOPHIL # BLD AUTO: 0.1 X10'3 (ref 0–0.9)
EOSINOPHIL NFR BLD AUTO: 1.3 % (ref 0–6)
EOSINOPHIL NFR BLD MANUAL: 2 % (ref 0–6)
ERYTHROCYTE [DISTWIDTH] IN BLOOD BY AUTOMATED COUNT: 14.4 % (ref 11.5–14.5)
GFR SERPL CREATININE-BSD FRML MDRD: 59 ML/MIN
GLUCOSE SERPL-MCNC: 104 MG/DL (ref 70–104)
HCO3 BLDA-SCNC: 29.5 MMOL/L (ref 22–26)
HCT VFR BLD AUTO: 26.2 % (ref 42–52)
HGB BLD-MCNC: 8.9 G/DL (ref 14–17.9)
HGB BLDA-MCNC: 10.1 G/DL (ref 14–18)
INHALED O2 FLOW RATE: 3 L/MIN
LYMPHOCYTES # BLD AUTO: 5.8 X10'3 (ref 1.1–4.8)
LYMPHOCYTES NFR BLD AUTO: 72.7 % (ref 21–51)
LYMPHOCYTES NFR BLD MANUAL: 72 % (ref 21–51)
MCH RBC QN AUTO: 31.4 PG (ref 27–31)
MCHC RBC AUTO-ENTMCNC: 34 % (ref 33–36.5)
MCV RBC AUTO: 92.3 FL (ref 78–98)
METHGB MFR BLDA: 0.2 % (ref 0.3–1.12)
MONOCYTES # BLD AUTO: 0.2 X10'3 (ref 0–0.9)
MONOCYTES NFR BLD AUTO: 2.5 % (ref 2–12)
MONOCYTES NFR BLD MANUAL: 2 % (ref 2–12)
NEUTROPHILS # BLD AUTO: 1.9 X10'3 (ref 1.8–7.7)
NEUTROPHILS NFR BLD AUTO: 23.4 % (ref 42–75)
NEUTS BAND # BLD MANUAL: 1 % (ref 0–10)
NEUTS SEG NFR BLD MANUAL: 20 % (ref 42–75)
OXYHGB MFR BLDA: 91.7 % (ref 94–100)
PCO2 TEMP ADJ BLDA: 47.8 MMHG (ref 35–48)
PH TEMP ADJ BLDA: 7.41 [PH] (ref 7.35–7.45)
PLATELET # BLD AUTO: 105 X10'3 (ref 140–440)
PLATELET BLD QL SMEAR: (no result)
PMV BLD AUTO: 8.7 FL (ref 7.4–10.4)
PO2 TEMP ADJ BLD: 66 MMHG (ref 83–108)
POTASSIUM SERPL-SCNC: 4.2 MMOL/L (ref 3.5–5.1)
PROT SERPL-MCNC: 6.1 G/DL (ref 6.4–8.2)
RBC # BLD AUTO: 2.84 X10'6 (ref 4.7–6.1)
RBC MORPH BLD: NORMAL
SAO2 % BLDA: 92.1 % (ref 95–98)
SODIUM SERPL-SCNC: 141 MMOL/L (ref 135–145)
TOTAL CELLS COUNTED FLD: 100
VARIANT LYMPHS NFR BLD MANUAL: 3 % (ref 0–0)
WBC # BLD AUTO: 8 X10'3 (ref 4.5–11)

## 2018-04-11 RX ADMIN — IPRATROPIUM BROMIDE AND ALBUTEROL SULFATE SCH ML: .5; 3 SOLUTION RESPIRATORY (INHALATION) at 19:30

## 2018-04-11 RX ADMIN — METOCLOPRAMIDE PRN MG: 5 INJECTION, SOLUTION INTRAMUSCULAR; INTRAVENOUS at 11:58

## 2018-04-11 RX ADMIN — BUDESONIDE SCH MG: 0.5 INHALANT RESPIRATORY (INHALATION) at 06:52

## 2018-04-11 RX ADMIN — IPRATROPIUM BROMIDE AND ALBUTEROL SULFATE SCH ML: .5; 3 SOLUTION RESPIRATORY (INHALATION) at 03:29

## 2018-04-11 RX ADMIN — HEPARIN SODIUM SCH UNIT: 5000 INJECTION, SOLUTION INTRAVENOUS; SUBCUTANEOUS at 20:36

## 2018-04-11 RX ADMIN — ONDANSETRON PRN MG: 2 INJECTION, SOLUTION INTRAMUSCULAR; INTRAVENOUS at 17:38

## 2018-04-11 RX ADMIN — ONDANSETRON PRN MG: 2 INJECTION, SOLUTION INTRAMUSCULAR; INTRAVENOUS at 07:19

## 2018-04-11 RX ADMIN — PANTOPRAZOLE SODIUM SCH MG: 40 TABLET, DELAYED RELEASE ORAL at 07:20

## 2018-04-11 RX ADMIN — HYDROCODONE BITARTRATE AND ACETAMINOPHEN PRN TAB: 5; 325 TABLET ORAL at 03:23

## 2018-04-11 RX ADMIN — Medication SCH MG: at 07:19

## 2018-04-11 RX ADMIN — Medication SCH MMU: at 20:35

## 2018-04-11 RX ADMIN — Medication SCH MMU: at 07:20

## 2018-04-11 RX ADMIN — HYDROCODONE BITARTRATE AND ACETAMINOPHEN PRN TAB: 5; 325 TABLET ORAL at 20:36

## 2018-04-11 RX ADMIN — HEPARIN SODIUM SCH UNIT: 5000 INJECTION, SOLUTION INTRAVENOUS; SUBCUTANEOUS at 07:19

## 2018-04-11 RX ADMIN — HYDROCODONE BITARTRATE AND ACETAMINOPHEN PRN TAB: 5; 325 TABLET ORAL at 16:36

## 2018-04-11 RX ADMIN — HYDROCODONE BITARTRATE AND ACETAMINOPHEN PRN TAB: 5; 325 TABLET ORAL at 10:02

## 2018-04-11 RX ADMIN — GUAIFENESIN SCH MG: 600 TABLET, EXTENDED RELEASE ORAL at 20:35

## 2018-04-11 RX ADMIN — INSULIN GLARGINE SCH UNIT: 100 INJECTION, SOLUTION SUBCUTANEOUS at 20:45

## 2018-04-11 RX ADMIN — BUDESONIDE SCH MG: 0.5 INHALANT RESPIRATORY (INHALATION) at 19:30

## 2018-04-11 RX ADMIN — IPRATROPIUM BROMIDE AND ALBUTEROL SULFATE SCH ML: .5; 3 SOLUTION RESPIRATORY (INHALATION) at 14:57

## 2018-04-11 RX ADMIN — IPRATROPIUM BROMIDE AND ALBUTEROL SULFATE SCH ML: .5; 3 SOLUTION RESPIRATORY (INHALATION) at 06:52

## 2018-04-11 RX ADMIN — GUAIFENESIN SCH MG: 600 TABLET, EXTENDED RELEASE ORAL at 07:20

## 2018-04-11 RX ADMIN — IPRATROPIUM BROMIDE AND ALBUTEROL SULFATE SCH ML: .5; 3 SOLUTION RESPIRATORY (INHALATION) at 11:33

## 2018-04-12 VITALS — DIASTOLIC BLOOD PRESSURE: 68 MMHG | SYSTOLIC BLOOD PRESSURE: 117 MMHG

## 2018-04-12 VITALS — SYSTOLIC BLOOD PRESSURE: 99 MMHG | DIASTOLIC BLOOD PRESSURE: 66 MMHG

## 2018-04-12 VITALS — SYSTOLIC BLOOD PRESSURE: 105 MMHG | DIASTOLIC BLOOD PRESSURE: 72 MMHG

## 2018-04-12 VITALS — SYSTOLIC BLOOD PRESSURE: 114 MMHG | DIASTOLIC BLOOD PRESSURE: 63 MMHG

## 2018-04-12 VITALS — SYSTOLIC BLOOD PRESSURE: 88 MMHG | DIASTOLIC BLOOD PRESSURE: 60 MMHG

## 2018-04-12 VITALS — DIASTOLIC BLOOD PRESSURE: 69 MMHG | SYSTOLIC BLOOD PRESSURE: 100 MMHG

## 2018-04-12 LAB
ALBUMIN SERPL BCP-MCNC: 3.1 G/DL (ref 3.4–5)
ALBUMIN/GLOB SERPL: 1.1 {RATIO} (ref 1.1–1.5)
ALP SERPL-CCNC: 63 IU/L (ref 46–116)
ALT SERPL W P-5'-P-CCNC: 26 U/L (ref 12–78)
ANION GAP SERPL CALCULATED.3IONS-SCNC: 7 MMOL/L (ref 8–16)
AST SERPL W P-5'-P-CCNC: 11 U/L (ref 10–37)
BASOPHILS # BLD AUTO: 0 X10'3 (ref 0–0.2)
BASOPHILS NFR BLD AUTO: 0.4 % (ref 0–1)
BILIRUB SERPL-MCNC: 0.4 MG/DL (ref 0.1–1)
BUN SERPL-MCNC: 17 MG/DL (ref 7–18)
BUN/CREAT SERPL: 14.2 (ref 5.4–32)
CALCIUM SERPL-MCNC: 8.8 MG/DL (ref 8.5–10.1)
CHLORIDE SERPL-SCNC: 102 MMOL/L (ref 99–107)
CO2 SERPL-SCNC: 32.4 MMOL/L (ref 24–32)
CREAT SERPL-MCNC: 1.2 MG/DL (ref 0.6–1.1)
EOSINOPHIL # BLD AUTO: 0.1 X10'3 (ref 0–0.9)
EOSINOPHIL NFR BLD AUTO: 1.1 % (ref 0–6)
ERYTHROCYTE [DISTWIDTH] IN BLOOD BY AUTOMATED COUNT: 15.8 % (ref 11.5–14.5)
GFR SERPL CREATININE-BSD FRML MDRD: 61 ML/MIN
GLUCOSE SERPL-MCNC: 102 MG/DL (ref 70–104)
HCT VFR BLD AUTO: 25 % (ref 42–52)
HGB BLD-MCNC: 8.4 G/DL (ref 14–17.9)
LYMPHOCYTES # BLD AUTO: 4.8 X10'3 (ref 1.1–4.8)
LYMPHOCYTES NFR BLD AUTO: 72.7 % (ref 21–51)
MAGNESIUM SERPL-MCNC: 1.7 MG/DL (ref 1.5–2.4)
MCH RBC QN AUTO: 31.4 PG (ref 27–31)
MCHC RBC AUTO-ENTMCNC: 33.7 % (ref 33–36.5)
MCV RBC AUTO: 93.1 FL (ref 78–98)
MONOCYTES # BLD AUTO: 0.1 X10'3 (ref 0–0.9)
MONOCYTES NFR BLD AUTO: 1.2 % (ref 2–12)
NEUTROPHILS # BLD AUTO: 1.6 X10'3 (ref 1.8–7.7)
NEUTROPHILS NFR BLD AUTO: 24.6 % (ref 42–75)
PLATELET # BLD AUTO: 104 X10'3 (ref 140–440)
PMV BLD AUTO: 8.3 FL (ref 7.4–10.4)
POTASSIUM SERPL-SCNC: 4.1 MMOL/L (ref 3.5–5.1)
PROT SERPL-MCNC: 5.9 G/DL (ref 6.4–8.2)
RBC # BLD AUTO: 2.69 X10'6 (ref 4.7–6.1)
SODIUM SERPL-SCNC: 141 MMOL/L (ref 135–145)
WBC # BLD AUTO: 6.6 X10'3 (ref 4.5–11)

## 2018-04-12 RX ADMIN — ONDANSETRON PRN MG: 2 INJECTION, SOLUTION INTRAMUSCULAR; INTRAVENOUS at 07:34

## 2018-04-12 RX ADMIN — HYDROCODONE BITARTRATE AND ACETAMINOPHEN PRN TAB: 5; 325 TABLET ORAL at 07:36

## 2018-04-12 RX ADMIN — IPRATROPIUM BROMIDE AND ALBUTEROL SULFATE SCH ML: .5; 3 SOLUTION RESPIRATORY (INHALATION) at 00:04

## 2018-04-12 RX ADMIN — Medication SCH MMU: at 20:00

## 2018-04-12 RX ADMIN — IPRATROPIUM BROMIDE AND ALBUTEROL SULFATE SCH ML: .5; 3 SOLUTION RESPIRATORY (INHALATION) at 19:59

## 2018-04-12 RX ADMIN — Medication SCH MG: at 07:34

## 2018-04-12 RX ADMIN — IPRATROPIUM BROMIDE AND ALBUTEROL SULFATE SCH ML: .5; 3 SOLUTION RESPIRATORY (INHALATION) at 03:56

## 2018-04-12 RX ADMIN — HYDROCODONE BITARTRATE AND ACETAMINOPHEN PRN TAB: 5; 325 TABLET ORAL at 21:55

## 2018-04-12 RX ADMIN — GUAIFENESIN SCH MG: 600 TABLET, EXTENDED RELEASE ORAL at 20:00

## 2018-04-12 RX ADMIN — HYDROCODONE BITARTRATE AND ACETAMINOPHEN PRN TAB: 5; 325 TABLET ORAL at 02:38

## 2018-04-12 RX ADMIN — IPRATROPIUM BROMIDE AND ALBUTEROL SULFATE SCH ML: .5; 3 SOLUTION RESPIRATORY (INHALATION) at 16:18

## 2018-04-12 RX ADMIN — HEPARIN SODIUM SCH UNIT: 5000 INJECTION, SOLUTION INTRAVENOUS; SUBCUTANEOUS at 20:08

## 2018-04-12 RX ADMIN — BUDESONIDE SCH MG: 0.5 INHALANT RESPIRATORY (INHALATION) at 06:57

## 2018-04-12 RX ADMIN — PANTOPRAZOLE SODIUM SCH MG: 40 TABLET, DELAYED RELEASE ORAL at 07:34

## 2018-04-12 RX ADMIN — HYDROCODONE BITARTRATE AND ACETAMINOPHEN PRN TAB: 5; 325 TABLET ORAL at 12:02

## 2018-04-12 RX ADMIN — Medication SCH MMU: at 07:35

## 2018-04-12 RX ADMIN — IPRATROPIUM BROMIDE AND ALBUTEROL SULFATE SCH ML: .5; 3 SOLUTION RESPIRATORY (INHALATION) at 23:55

## 2018-04-12 RX ADMIN — HYDROCODONE BITARTRATE AND ACETAMINOPHEN PRN TAB: 5; 325 TABLET ORAL at 17:35

## 2018-04-12 RX ADMIN — BUDESONIDE SCH MG: 0.5 INHALANT RESPIRATORY (INHALATION) at 19:59

## 2018-04-12 RX ADMIN — IPRATROPIUM BROMIDE AND ALBUTEROL SULFATE SCH ML: .5; 3 SOLUTION RESPIRATORY (INHALATION) at 06:55

## 2018-04-12 RX ADMIN — GUAIFENESIN SCH MG: 600 TABLET, EXTENDED RELEASE ORAL at 07:35

## 2018-04-12 RX ADMIN — HEPARIN SODIUM SCH UNIT: 5000 INJECTION, SOLUTION INTRAVENOUS; SUBCUTANEOUS at 07:34

## 2018-04-12 RX ADMIN — ONDANSETRON PRN MG: 2 INJECTION, SOLUTION INTRAMUSCULAR; INTRAVENOUS at 17:35

## 2018-04-12 RX ADMIN — IPRATROPIUM BROMIDE AND ALBUTEROL SULFATE SCH ML: .5; 3 SOLUTION RESPIRATORY (INHALATION) at 10:21

## 2018-04-12 RX ADMIN — INSULIN GLARGINE SCH UNIT: 100 INJECTION, SOLUTION SUBCUTANEOUS at 21:00

## 2018-04-12 RX ADMIN — METOCLOPRAMIDE PRN MG: 5 INJECTION, SOLUTION INTRAMUSCULAR; INTRAVENOUS at 12:02

## 2018-04-13 VITALS — DIASTOLIC BLOOD PRESSURE: 52 MMHG | SYSTOLIC BLOOD PRESSURE: 93 MMHG

## 2018-04-13 VITALS — SYSTOLIC BLOOD PRESSURE: 99 MMHG | DIASTOLIC BLOOD PRESSURE: 60 MMHG

## 2018-04-13 VITALS — DIASTOLIC BLOOD PRESSURE: 66 MMHG | SYSTOLIC BLOOD PRESSURE: 98 MMHG

## 2018-04-13 VITALS — SYSTOLIC BLOOD PRESSURE: 100 MMHG | DIASTOLIC BLOOD PRESSURE: 62 MMHG

## 2018-04-13 VITALS — DIASTOLIC BLOOD PRESSURE: 54 MMHG | SYSTOLIC BLOOD PRESSURE: 92 MMHG

## 2018-04-13 VITALS — SYSTOLIC BLOOD PRESSURE: 97 MMHG | DIASTOLIC BLOOD PRESSURE: 71 MMHG

## 2018-04-13 VITALS — SYSTOLIC BLOOD PRESSURE: 101 MMHG | DIASTOLIC BLOOD PRESSURE: 67 MMHG

## 2018-04-13 LAB
ALBUMIN SERPL BCP-MCNC: 3.6 G/DL (ref 3.4–5)
ANION GAP SERPL CALCULATED.3IONS-SCNC: 8 MMOL/L (ref 8–16)
BUN SERPL-MCNC: 29 MG/DL (ref 7–18)
BUN/CREAT SERPL: 21.2 (ref 5.4–32)
CALCIUM SERPL-MCNC: 9 MG/DL (ref 8.5–10.1)
CHLORIDE SERPL-SCNC: 99 MMOL/L (ref 99–107)
CO2 SERPL-SCNC: 32.7 MMOL/L (ref 24–32)
CREAT SERPL-MCNC: 1.37 MG/DL (ref 0.6–1.1)
GFR SERPL CREATININE-BSD FRML MDRD: 52 ML/MIN
GLUCOSE SERPL-MCNC: 96 MG/DL (ref 70–104)
MAGNESIUM SERPL-MCNC: 2 MG/DL (ref 1.5–2.4)
POTASSIUM SERPL-SCNC: 4.3 MMOL/L (ref 3.5–5.1)
SODIUM SERPL-SCNC: 140 MMOL/L (ref 135–145)

## 2018-04-13 RX ADMIN — HYDROCODONE BITARTRATE AND ACETAMINOPHEN PRN TAB: 5; 325 TABLET ORAL at 22:13

## 2018-04-13 RX ADMIN — IPRATROPIUM BROMIDE AND ALBUTEROL SULFATE SCH ML: .5; 3 SOLUTION RESPIRATORY (INHALATION) at 10:51

## 2018-04-13 RX ADMIN — Medication SCH MG: at 07:25

## 2018-04-13 RX ADMIN — IPRATROPIUM BROMIDE AND ALBUTEROL SULFATE SCH ML: .5; 3 SOLUTION RESPIRATORY (INHALATION) at 19:43

## 2018-04-13 RX ADMIN — METOCLOPRAMIDE PRN MG: 5 INJECTION, SOLUTION INTRAMUSCULAR; INTRAVENOUS at 12:06

## 2018-04-13 RX ADMIN — BUDESONIDE SCH MG: 0.5 INHALANT RESPIRATORY (INHALATION) at 19:42

## 2018-04-13 RX ADMIN — IPRATROPIUM BROMIDE AND ALBUTEROL SULFATE SCH ML: .5; 3 SOLUTION RESPIRATORY (INHALATION) at 15:32

## 2018-04-13 RX ADMIN — HEPARIN SODIUM SCH UNIT: 5000 INJECTION, SOLUTION INTRAVENOUS; SUBCUTANEOUS at 07:24

## 2018-04-13 RX ADMIN — HEPARIN SODIUM SCH UNIT: 5000 INJECTION, SOLUTION INTRAVENOUS; SUBCUTANEOUS at 20:33

## 2018-04-13 RX ADMIN — IPRATROPIUM BROMIDE AND ALBUTEROL SULFATE SCH ML: .5; 3 SOLUTION RESPIRATORY (INHALATION) at 04:07

## 2018-04-13 RX ADMIN — GUAIFENESIN SCH MG: 600 TABLET, EXTENDED RELEASE ORAL at 20:32

## 2018-04-13 RX ADMIN — Medication SCH MMU: at 07:24

## 2018-04-13 RX ADMIN — BUDESONIDE SCH MG: 0.5 INHALANT RESPIRATORY (INHALATION) at 07:01

## 2018-04-13 RX ADMIN — GUAIFENESIN SCH MG: 600 TABLET, EXTENDED RELEASE ORAL at 07:25

## 2018-04-13 RX ADMIN — HYDROCODONE BITARTRATE AND ACETAMINOPHEN PRN TAB: 5; 325 TABLET ORAL at 04:40

## 2018-04-13 RX ADMIN — ONDANSETRON PRN MG: 2 INJECTION, SOLUTION INTRAMUSCULAR; INTRAVENOUS at 07:24

## 2018-04-13 RX ADMIN — PANTOPRAZOLE SODIUM SCH MG: 40 TABLET, DELAYED RELEASE ORAL at 07:25

## 2018-04-13 RX ADMIN — INSULIN GLARGINE SCH UNIT: 100 INJECTION, SOLUTION SUBCUTANEOUS at 21:00

## 2018-04-13 RX ADMIN — Medication SCH MMU: at 20:31

## 2018-04-13 RX ADMIN — IPRATROPIUM BROMIDE AND ALBUTEROL SULFATE SCH ML: .5; 3 SOLUTION RESPIRATORY (INHALATION) at 23:44

## 2018-04-13 RX ADMIN — HYDROCODONE BITARTRATE AND ACETAMINOPHEN PRN TAB: 5; 325 TABLET ORAL at 09:20

## 2018-04-13 RX ADMIN — ONDANSETRON PRN MG: 2 INJECTION, SOLUTION INTRAMUSCULAR; INTRAVENOUS at 16:54

## 2018-04-13 RX ADMIN — IPRATROPIUM BROMIDE AND ALBUTEROL SULFATE SCH ML: .5; 3 SOLUTION RESPIRATORY (INHALATION) at 07:01

## 2018-04-14 VITALS — DIASTOLIC BLOOD PRESSURE: 52 MMHG | SYSTOLIC BLOOD PRESSURE: 86 MMHG

## 2018-04-14 VITALS — SYSTOLIC BLOOD PRESSURE: 85 MMHG | DIASTOLIC BLOOD PRESSURE: 51 MMHG

## 2018-04-14 VITALS — SYSTOLIC BLOOD PRESSURE: 96 MMHG | DIASTOLIC BLOOD PRESSURE: 67 MMHG

## 2018-04-14 VITALS — SYSTOLIC BLOOD PRESSURE: 92 MMHG | DIASTOLIC BLOOD PRESSURE: 54 MMHG

## 2018-04-14 VITALS — SYSTOLIC BLOOD PRESSURE: 106 MMHG | DIASTOLIC BLOOD PRESSURE: 61 MMHG

## 2018-04-14 LAB
ALBUMIN SERPL BCP-MCNC: 3.6 G/DL (ref 3.4–5)
ANION GAP SERPL CALCULATED.3IONS-SCNC: 7 MMOL/L (ref 8–16)
ANISOCYTOSIS BLD QL SMEAR: (no result)
BASOPHILS # BLD AUTO: 0 X10'3 (ref 0–0.2)
BASOPHILS NFR BLD AUTO: 0.2 % (ref 0–1)
BUN SERPL-MCNC: 32 MG/DL (ref 7–18)
BUN/CREAT SERPL: 23.4 (ref 5.4–32)
CALCIUM SERPL-MCNC: 8.9 MG/DL (ref 8.5–10.1)
CHLORIDE SERPL-SCNC: 100 MMOL/L (ref 99–107)
CO2 SERPL-SCNC: 34.9 MMOL/L (ref 24–32)
CREAT SERPL-MCNC: 1.37 MG/DL (ref 0.6–1.1)
EOSINOPHIL # BLD AUTO: 0 X10'3 (ref 0–0.9)
EOSINOPHIL NFR BLD AUTO: 0.7 % (ref 0–6)
ERYTHROCYTE [DISTWIDTH] IN BLOOD BY AUTOMATED COUNT: 16.2 % (ref 11.5–14.5)
GFR SERPL CREATININE-BSD FRML MDRD: 52 ML/MIN
GLUCOSE SERPL-MCNC: 104 MG/DL (ref 70–104)
HCT VFR BLD AUTO: 27.4 % (ref 42–52)
HGB BLD-MCNC: 9 G/DL (ref 14–17.9)
HYPOCHROMIA BLD QL SMEAR: (no result)
LYMPHOCYTES # BLD AUTO: 3.6 X10'3 (ref 1.1–4.8)
LYMPHOCYTES NFR BLD AUTO: 59.9 % (ref 21–51)
LYMPHOCYTES NFR BLD MANUAL: 53 % (ref 21–51)
MAGNESIUM SERPL-MCNC: 1.9 MG/DL (ref 1.5–2.4)
MCH RBC QN AUTO: 30.5 PG (ref 27–31)
MCHC RBC AUTO-ENTMCNC: 32.9 % (ref 33–36.5)
MCV RBC AUTO: 92.7 FL (ref 78–98)
MONOCYTES # BLD AUTO: 0 X10'3 (ref 0–0.9)
MONOCYTES NFR BLD AUTO: 0.8 % (ref 2–12)
MONOCYTES NFR BLD MANUAL: 2 % (ref 2–12)
NEUTROPHILS # BLD AUTO: 2.3 X10'3 (ref 1.8–7.7)
NEUTROPHILS NFR BLD AUTO: 38.4 % (ref 42–75)
NEUTS SEG NFR BLD MANUAL: 40 % (ref 42–75)
PLATELET # BLD AUTO: 107 X10'3 (ref 140–440)
PLATELET BLD QL SMEAR: (no result)
PMV BLD AUTO: 8.4 FL (ref 7.4–10.4)
POLYCHROMASIA BLD QL SMEAR: (no result)
POTASSIUM SERPL-SCNC: 4 MMOL/L (ref 3.5–5.1)
RBC # BLD AUTO: 2.96 X10'6 (ref 4.7–6.1)
RBC MORPH BLD: (no result)
SMUDGE CELLS BLD QL SMEAR: (no result)
SODIUM SERPL-SCNC: 142 MMOL/L (ref 135–145)
TOTAL CELLS COUNTED FLD: 100
VARIANT LYMPHS NFR BLD MANUAL: 5 % (ref 0–0)
WBC # BLD AUTO: 6 X10'3 (ref 4.5–11)

## 2018-04-14 RX ADMIN — Medication SCH MG: at 07:15

## 2018-04-14 RX ADMIN — ONDANSETRON PRN MG: 2 INJECTION, SOLUTION INTRAMUSCULAR; INTRAVENOUS at 07:17

## 2018-04-14 RX ADMIN — IPRATROPIUM BROMIDE AND ALBUTEROL SULFATE SCH ML: .5; 3 SOLUTION RESPIRATORY (INHALATION) at 04:15

## 2018-04-14 RX ADMIN — IPRATROPIUM BROMIDE AND ALBUTEROL SULFATE SCH ML: .5; 3 SOLUTION RESPIRATORY (INHALATION) at 07:09

## 2018-04-14 RX ADMIN — IPRATROPIUM BROMIDE AND ALBUTEROL SULFATE SCH ML: .5; 3 SOLUTION RESPIRATORY (INHALATION) at 16:04

## 2018-04-14 RX ADMIN — PANTOPRAZOLE SODIUM SCH MG: 40 TABLET, DELAYED RELEASE ORAL at 07:15

## 2018-04-14 RX ADMIN — IPRATROPIUM BROMIDE AND ALBUTEROL SULFATE SCH ML: .5; 3 SOLUTION RESPIRATORY (INHALATION) at 10:46

## 2018-04-14 RX ADMIN — BUDESONIDE SCH MG: 0.5 INHALANT RESPIRATORY (INHALATION) at 07:09

## 2018-04-14 RX ADMIN — HYDROCODONE BITARTRATE AND ACETAMINOPHEN PRN TAB: 5; 325 TABLET ORAL at 13:07

## 2018-04-14 RX ADMIN — Medication SCH MMU: at 07:15

## 2018-04-14 RX ADMIN — HYDROCODONE BITARTRATE AND ACETAMINOPHEN PRN TAB: 5; 325 TABLET ORAL at 05:40

## 2018-04-14 RX ADMIN — HEPARIN SODIUM SCH UNIT: 5000 INJECTION, SOLUTION INTRAVENOUS; SUBCUTANEOUS at 07:16

## 2018-04-14 RX ADMIN — GUAIFENESIN SCH MG: 600 TABLET, EXTENDED RELEASE ORAL at 07:15

## 2018-11-07 ENCOUNTER — HOSPITAL ENCOUNTER (INPATIENT)
Dept: HOSPITAL 94 - ER | Age: 68
LOS: 4 days | Discharge: HOME | DRG: 286 | End: 2018-11-11
Attending: INTERNAL MEDICINE | Admitting: INTERNAL MEDICINE
Payer: MEDICARE

## 2018-11-07 VITALS — HEIGHT: 74 IN | BODY MASS INDEX: 32.93 KG/M2 | WEIGHT: 256.62 LBS

## 2018-11-07 VITALS — DIASTOLIC BLOOD PRESSURE: 54 MMHG | SYSTOLIC BLOOD PRESSURE: 124 MMHG

## 2018-11-07 VITALS — SYSTOLIC BLOOD PRESSURE: 103 MMHG | DIASTOLIC BLOOD PRESSURE: 76 MMHG

## 2018-11-07 DIAGNOSIS — I08.1: ICD-10-CM

## 2018-11-07 DIAGNOSIS — E87.5: ICD-10-CM

## 2018-11-07 DIAGNOSIS — Z99.81: ICD-10-CM

## 2018-11-07 DIAGNOSIS — G89.29: ICD-10-CM

## 2018-11-07 DIAGNOSIS — I13.0: ICD-10-CM

## 2018-11-07 DIAGNOSIS — Z79.01: ICD-10-CM

## 2018-11-07 DIAGNOSIS — F17.210: ICD-10-CM

## 2018-11-07 DIAGNOSIS — N17.9: ICD-10-CM

## 2018-11-07 DIAGNOSIS — M19.90: ICD-10-CM

## 2018-11-07 DIAGNOSIS — K21.9: ICD-10-CM

## 2018-11-07 DIAGNOSIS — I47.2: Primary | ICD-10-CM

## 2018-11-07 DIAGNOSIS — J96.10: ICD-10-CM

## 2018-11-07 DIAGNOSIS — I42.9: ICD-10-CM

## 2018-11-07 DIAGNOSIS — I50.23: ICD-10-CM

## 2018-11-07 DIAGNOSIS — J44.9: ICD-10-CM

## 2018-11-07 DIAGNOSIS — N18.9: ICD-10-CM

## 2018-11-07 DIAGNOSIS — Z82.49: ICD-10-CM

## 2018-11-07 DIAGNOSIS — Z79.899: ICD-10-CM

## 2018-11-07 DIAGNOSIS — C91.10: ICD-10-CM

## 2018-11-07 DIAGNOSIS — I25.10: ICD-10-CM

## 2018-11-07 DIAGNOSIS — I48.0: ICD-10-CM

## 2018-11-07 LAB
ALBUMIN SERPL BCP-MCNC: 3.4 G/DL (ref 3.4–5)
ALBUMIN/GLOB SERPL: 1 {RATIO} (ref 1.1–1.5)
ALP SERPL-CCNC: 95 IU/L (ref 46–116)
ALT SERPL W P-5'-P-CCNC: 21 U/L (ref 12–78)
ANION GAP BLD CALC-SCNC: 11 MMOL/L (ref 8–12)
ANION GAP SERPL CALCULATED.3IONS-SCNC: 10 MMOL/L (ref 8–16)
APTT PPP: 29 SECONDS (ref 22–32)
AST SERPL W P-5'-P-CCNC: 16 U/L (ref 10–37)
BASOPHILS # BLD AUTO: 0 X10'3 (ref 0–0.2)
BASOPHILS NFR BLD AUTO: 0.3 % (ref 0–1)
BILIRUB SERPL-MCNC: 0.4 MG/DL (ref 0.1–1)
BUN BLD-MCNC: 29 MG/DL (ref 6–19)
BUN SERPL-MCNC: 32 MG/DL (ref 7–18)
BUN/CREAT BLD: 16.1 (ref 5.4–32)
BUN/CREAT SERPL: 17.3 (ref 5.4–32)
CA-I BLD-SCNC: 1.11 MMOL/L (ref 1.03–1.32)
CALCIUM SERPL-MCNC: 8.8 MG/DL (ref 8.5–10.1)
CHLORIDE BLD-SCNC: 98 MMOL/L (ref 99–107)
CHLORIDE SERPL-SCNC: 98 MMOL/L (ref 99–107)
CO2 BLD-SCNC: 23 MMOL/L (ref 24–32)
CO2 SERPL-SCNC: 25.4 MMOL/L (ref 24–32)
CREAT BLD-MCNC: 1.8 MG/DL (ref 0.8–1.3)
CREAT SERPL-MCNC: 1.85 MG/DL (ref 0.6–1.1)
EOSINOPHIL # BLD AUTO: 0.3 X10'3 (ref 0–0.9)
EOSINOPHIL NFR BLD AUTO: 2.4 % (ref 0–6)
ERYTHROCYTE [DISTWIDTH] IN BLOOD BY AUTOMATED COUNT: 16.9 % (ref 11.5–14.5)
GFR SERPL CREATININE-BSD FRML MDRD: 37 ML/MIN
GFR SERPL CREATININE-BSD FRML MDRD: 38 ML/MIN
GLUCOSE SERPL-MCNC: 128 MG/DL (ref 70–104)
GLUCOSE SERPLBLD-MCNC: 124 MG/DL (ref 70–104)
HCT VFR BLD AUTO: 34.1 % (ref 42–52)
HCT VFR BLD CALC: 35 %PCV (ref 42–52)
HGB BLD CALC-MCNC: 11.9 G/DL (ref 14–18)
HGB BLD-MCNC: 10.9 G/DL (ref 14–17.9)
INR PPP: 1.1 INR
LYMPHOCYTES # BLD AUTO: 3.5 X10'3 (ref 1.1–4.8)
LYMPHOCYTES NFR BLD AUTO: 25.5 % (ref 21–51)
MAGNESIUM SERPL-MCNC: 1.5 MG/DL (ref 1.5–2.4)
MCH RBC QN AUTO: 25.2 PG (ref 27–31)
MCHC RBC AUTO-ENTMCNC: 32 % (ref 33–36.5)
MCV RBC AUTO: 78.8 FL (ref 78–98)
MONOCYTES # BLD AUTO: 0.6 X10'3 (ref 0–0.9)
MONOCYTES NFR BLD AUTO: 4.5 % (ref 2–12)
NEUTROPHILS # BLD AUTO: 9.3 X10'3 (ref 1.8–7.7)
NEUTROPHILS NFR BLD AUTO: 67.3 % (ref 42–75)
PLATELET # BLD AUTO: 133 X10'3 (ref 140–440)
PMV BLD AUTO: 9.4 FL (ref 7.4–10.4)
POTASSIUM BLD-SCNC: 5.4 MMOL/L (ref 3.5–5.1)
POTASSIUM SERPL-SCNC: 5.4 MMOL/L (ref 3.5–5.1)
PROT SERPL-MCNC: 6.7 G/DL (ref 6.4–8.2)
PROTHROMBIN TIME: 10.8 SECONDS (ref 9–12)
RBC # BLD AUTO: 4.33 X10'6 (ref 4.7–6.1)
SODIUM BLD-SCNC: 132 MMOL/L (ref 135–145)
SODIUM SERPL-SCNC: 133 MMOL/L (ref 135–145)
WBC # BLD AUTO: 13.9 X10'3 (ref 4.5–11)

## 2018-11-07 PROCEDURE — 99291 CRITICAL CARE FIRST HOUR: CPT

## 2018-11-07 PROCEDURE — 84439 ASSAY OF FREE THYROXINE: CPT

## 2018-11-07 PROCEDURE — 82803 BLOOD GASES ANY COMBINATION: CPT

## 2018-11-07 PROCEDURE — 87070 CULTURE OTHR SPECIMN AEROBIC: CPT

## 2018-11-07 PROCEDURE — 85025 COMPLETE CBC W/AUTO DIFF WBC: CPT

## 2018-11-07 PROCEDURE — 84484 ASSAY OF TROPONIN QUANT: CPT

## 2018-11-07 PROCEDURE — 99153 MOD SED SAME PHYS/QHP EA: CPT

## 2018-11-07 PROCEDURE — 82553 CREATINE MB FRACTION: CPT

## 2018-11-07 PROCEDURE — 97161 PT EVAL LOW COMPLEX 20 MIN: CPT

## 2018-11-07 PROCEDURE — 85014 HEMATOCRIT: CPT

## 2018-11-07 PROCEDURE — 96374 THER/PROPH/DIAG INJ IV PUSH: CPT

## 2018-11-07 PROCEDURE — 93005 ELECTROCARDIOGRAM TRACING: CPT

## 2018-11-07 PROCEDURE — 85730 THROMBOPLASTIN TIME PARTIAL: CPT

## 2018-11-07 PROCEDURE — 80061 LIPID PANEL: CPT

## 2018-11-07 PROCEDURE — 94640 AIRWAY INHALATION TREATMENT: CPT

## 2018-11-07 PROCEDURE — 80047 BASIC METABLC PNL IONIZED CA: CPT

## 2018-11-07 PROCEDURE — 97530 THERAPEUTIC ACTIVITIES: CPT

## 2018-11-07 PROCEDURE — 71045 X-RAY EXAM CHEST 1 VIEW: CPT

## 2018-11-07 PROCEDURE — 36415 COLL VENOUS BLD VENIPUNCTURE: CPT

## 2018-11-07 PROCEDURE — 80053 COMPREHEN METABOLIC PANEL: CPT

## 2018-11-07 PROCEDURE — 93460 R&L HRT ART/VENTRICLE ANGIO: CPT

## 2018-11-07 PROCEDURE — 97116 GAIT TRAINING THERAPY: CPT

## 2018-11-07 PROCEDURE — 83735 ASSAY OF MAGNESIUM: CPT

## 2018-11-07 PROCEDURE — 80162 ASSAY OF DIGOXIN TOTAL: CPT

## 2018-11-07 PROCEDURE — 80076 HEPATIC FUNCTION PANEL: CPT

## 2018-11-07 PROCEDURE — 85610 PROTHROMBIN TIME: CPT

## 2018-11-07 PROCEDURE — 99152 MOD SED SAME PHYS/QHP 5/>YRS: CPT

## 2018-11-07 PROCEDURE — 96375 TX/PRO/DX INJ NEW DRUG ADDON: CPT

## 2018-11-07 PROCEDURE — 84443 ASSAY THYROID STIM HORMONE: CPT

## 2018-11-07 PROCEDURE — 93306 TTE W/DOPPLER COMPLETE: CPT

## 2018-11-07 PROCEDURE — 94760 N-INVAS EAR/PLS OXIMETRY 1: CPT

## 2018-11-07 PROCEDURE — 83880 ASSAY OF NATRIURETIC PEPTIDE: CPT

## 2018-11-07 RX ADMIN — MAGNESIUM SULFATE HEPTAHYDRATE SCH MLS/HR: 1 INJECTION, SOLUTION INTRAVENOUS at 20:15

## 2018-11-07 RX ADMIN — CARVEDILOL SCH MG: 6.25 TABLET, FILM COATED ORAL at 20:08

## 2018-11-07 RX ADMIN — AMIODARONE HYDROCHLORIDE SCH MLS/HR: 1.8 INJECTION, SOLUTION INTRAVENOUS at 14:17

## 2018-11-07 RX ADMIN — AMIODARONE HYDROCHLORIDE SCH MLS/HR: 1.8 INJECTION, SOLUTION INTRAVENOUS at 20:01

## 2018-11-07 RX ADMIN — DEXTROSE SCH MLS/HR: 5 SOLUTION INTRAVENOUS at 17:22

## 2018-11-07 RX ADMIN — IPRATROPIUM BROMIDE AND ALBUTEROL SULFATE PRN ML: .5; 3 SOLUTION RESPIRATORY (INHALATION) at 22:48

## 2018-11-07 RX ADMIN — MAGNESIUM SULFATE HEPTAHYDRATE SCH MLS/HR: 1 INJECTION, SOLUTION INTRAVENOUS at 21:10

## 2018-11-08 VITALS — SYSTOLIC BLOOD PRESSURE: 81 MMHG | DIASTOLIC BLOOD PRESSURE: 41 MMHG

## 2018-11-08 VITALS — SYSTOLIC BLOOD PRESSURE: 84 MMHG | DIASTOLIC BLOOD PRESSURE: 66 MMHG

## 2018-11-08 VITALS — DIASTOLIC BLOOD PRESSURE: 42 MMHG | SYSTOLIC BLOOD PRESSURE: 91 MMHG

## 2018-11-08 VITALS — DIASTOLIC BLOOD PRESSURE: 59 MMHG | SYSTOLIC BLOOD PRESSURE: 122 MMHG

## 2018-11-08 VITALS — SYSTOLIC BLOOD PRESSURE: 80 MMHG | DIASTOLIC BLOOD PRESSURE: 47 MMHG

## 2018-11-08 VITALS — DIASTOLIC BLOOD PRESSURE: 61 MMHG | SYSTOLIC BLOOD PRESSURE: 106 MMHG

## 2018-11-08 VITALS — DIASTOLIC BLOOD PRESSURE: 57 MMHG | SYSTOLIC BLOOD PRESSURE: 110 MMHG

## 2018-11-08 VITALS — DIASTOLIC BLOOD PRESSURE: 41 MMHG | SYSTOLIC BLOOD PRESSURE: 114 MMHG

## 2018-11-08 VITALS — DIASTOLIC BLOOD PRESSURE: 78 MMHG | SYSTOLIC BLOOD PRESSURE: 115 MMHG

## 2018-11-08 VITALS — SYSTOLIC BLOOD PRESSURE: 101 MMHG | DIASTOLIC BLOOD PRESSURE: 46 MMHG

## 2018-11-08 VITALS — SYSTOLIC BLOOD PRESSURE: 104 MMHG | DIASTOLIC BLOOD PRESSURE: 78 MMHG

## 2018-11-08 VITALS — SYSTOLIC BLOOD PRESSURE: 115 MMHG | DIASTOLIC BLOOD PRESSURE: 78 MMHG

## 2018-11-08 LAB
ALBUMIN SERPL BCP-MCNC: 3.3 G/DL (ref 3.4–5)
ALBUMIN/GLOB SERPL: 1 {RATIO} (ref 1.1–1.5)
ALP SERPL-CCNC: 106 IU/L (ref 46–116)
ALT SERPL W P-5'-P-CCNC: 144 U/L (ref 12–78)
ANION GAP SERPL CALCULATED.3IONS-SCNC: 10 MMOL/L (ref 8–16)
AST SERPL W P-5'-P-CCNC: 136 U/L (ref 10–37)
BASOPHILS # BLD AUTO: 0.1 X10'3 (ref 0–0.2)
BASOPHILS NFR BLD AUTO: 0.4 % (ref 0–1)
BILIRUB SERPL-MCNC: 0.5 MG/DL (ref 0.1–1)
BUN SERPL-MCNC: 39 MG/DL (ref 7–18)
BUN/CREAT SERPL: 19.4 (ref 5.4–32)
CALCIUM SERPL-MCNC: 8.9 MG/DL (ref 8.5–10.1)
CHLORIDE SERPL-SCNC: 94 MMOL/L (ref 99–107)
CO2 SERPL-SCNC: 24.1 MMOL/L (ref 24–32)
CREAT SERPL-MCNC: 2.01 MG/DL (ref 0.6–1.1)
EOSINOPHIL # BLD AUTO: 0.3 X10'3 (ref 0–0.9)
EOSINOPHIL NFR BLD AUTO: 1.5 % (ref 0–6)
ERYTHROCYTE [DISTWIDTH] IN BLOOD BY AUTOMATED COUNT: 16.9 % (ref 11.5–14.5)
GFR SERPL CREATININE-BSD FRML MDRD: 33 ML/MIN
GLUCOSE SERPL-MCNC: 136 MG/DL (ref 70–104)
HCT VFR BLD AUTO: 33.2 % (ref 42–52)
HGB BLD-MCNC: 10.6 G/DL (ref 14–17.9)
LYMPHOCYTES # BLD AUTO: 6.3 X10'3 (ref 1.1–4.8)
LYMPHOCYTES NFR BLD AUTO: 32.1 % (ref 21–51)
MAGNESIUM SERPL-MCNC: 2.1 MG/DL (ref 1.5–2.4)
MCH RBC QN AUTO: 25.1 PG (ref 27–31)
MCHC RBC AUTO-ENTMCNC: 32 % (ref 33–36.5)
MCV RBC AUTO: 78.6 FL (ref 78–98)
MONOCYTES # BLD AUTO: 0.8 X10'3 (ref 0–0.9)
MONOCYTES NFR BLD AUTO: 4.3 % (ref 2–12)
NEUTROPHILS # BLD AUTO: 12.1 X10'3 (ref 1.8–7.7)
NEUTROPHILS NFR BLD AUTO: 61.7 % (ref 42–75)
PLATELET # BLD AUTO: 150 X10'3 (ref 140–440)
PMV BLD AUTO: 9.9 FL (ref 7.4–10.4)
POTASSIUM SERPL-SCNC: 5.3 MMOL/L (ref 3.5–5.1)
PROT SERPL-MCNC: 6.5 G/DL (ref 6.4–8.2)
RBC # BLD AUTO: 4.23 X10'6 (ref 4.7–6.1)
SODIUM SERPL-SCNC: 128 MMOL/L (ref 135–145)
WBC # BLD AUTO: 19.7 X10'3 (ref 4.5–11)

## 2018-11-08 PROCEDURE — B2111ZZ FLUOROSCOPY OF MULTIPLE CORONARY ARTERIES USING LOW OSMOLAR CONTRAST: ICD-10-PCS | Performed by: INTERNAL MEDICINE

## 2018-11-08 PROCEDURE — 4A023N8 MEASUREMENT OF CARDIAC SAMPLING AND PRESSURE, BILATERAL, PERCUTANEOUS APPROACH: ICD-10-PCS | Performed by: INTERNAL MEDICINE

## 2018-11-08 PROCEDURE — B2151ZZ FLUOROSCOPY OF LEFT HEART USING LOW OSMOLAR CONTRAST: ICD-10-PCS | Performed by: INTERNAL MEDICINE

## 2018-11-08 RX ADMIN — AMIODARONE HYDROCHLORIDE SCH MLS/HR: 1.8 INJECTION, SOLUTION INTRAVENOUS at 02:08

## 2018-11-08 RX ADMIN — DEXTROSE SCH MLS/HR: 5 SOLUTION INTRAVENOUS at 16:38

## 2018-11-08 RX ADMIN — AMIODARONE HYDROCHLORIDE SCH MLS/HR: 1.8 INJECTION, SOLUTION INTRAVENOUS at 07:35

## 2018-11-08 RX ADMIN — AMIODARONE HYDROCHLORIDE SCH MLS/HR: 1.8 INJECTION, SOLUTION INTRAVENOUS at 21:51

## 2018-11-08 RX ADMIN — SODIUM CHLORIDE SCH MLS/HR: 9 INJECTION INTRAMUSCULAR; INTRAVENOUS; SUBCUTANEOUS at 07:28

## 2018-11-08 RX ADMIN — ALBUTEROL SULFATE SCH MG: 2.5 SOLUTION RESPIRATORY (INHALATION) at 08:07

## 2018-11-08 RX ADMIN — BUDESONIDE SCH MG: 0.5 INHALANT RESPIRATORY (INHALATION) at 19:50

## 2018-11-08 RX ADMIN — IPRATROPIUM BROMIDE AND ALBUTEROL SULFATE PRN ML: .5; 3 SOLUTION RESPIRATORY (INHALATION) at 03:33

## 2018-11-08 RX ADMIN — PANTOPRAZOLE SODIUM SCH MG: 40 TABLET, DELAYED RELEASE ORAL at 07:34

## 2018-11-08 RX ADMIN — BUDESONIDE SCH MG: 0.5 INHALANT RESPIRATORY (INHALATION) at 08:07

## 2018-11-08 RX ADMIN — ALBUTEROL SULFATE SCH MG: 2.5 SOLUTION RESPIRATORY (INHALATION) at 11:33

## 2018-11-08 RX ADMIN — ALBUTEROL SULFATE SCH MG: 2.5 SOLUTION RESPIRATORY (INHALATION) at 19:50

## 2018-11-08 RX ADMIN — CARVEDILOL SCH MG: 6.25 TABLET, FILM COATED ORAL at 07:34

## 2018-11-08 RX ADMIN — HYDROCODONE BITARTRATE AND ACETAMINOPHEN PRN TAB: 10; 325 TABLET ORAL at 14:46

## 2018-11-08 RX ADMIN — DOBUTAMINE IN DEXTROSE SCH MLS/HR: 200 INJECTION, SOLUTION INTRAVENOUS at 14:46

## 2018-11-08 RX ADMIN — AMIODARONE HYDROCHLORIDE SCH MLS/HR: 1.8 INJECTION, SOLUTION INTRAVENOUS at 14:16

## 2018-11-08 RX ADMIN — HYDROCODONE BITARTRATE AND ACETAMINOPHEN PRN TAB: 5; 325 TABLET ORAL at 20:17

## 2018-11-08 RX ADMIN — DEXTROSE SCH MLS/HR: 5 SOLUTION INTRAVENOUS at 20:18

## 2018-11-08 RX ADMIN — CARVEDILOL SCH MG: 6.25 TABLET, FILM COATED ORAL at 20:00

## 2018-11-08 RX ADMIN — ALBUTEROL SULFATE SCH MG: 2.5 SOLUTION RESPIRATORY (INHALATION) at 15:39

## 2018-11-08 RX ADMIN — ONDANSETRON PRN MG: 2 INJECTION, SOLUTION INTRAMUSCULAR; INTRAVENOUS at 12:05

## 2018-11-08 RX ADMIN — ONDANSETRON PRN MG: 2 INJECTION, SOLUTION INTRAMUSCULAR; INTRAVENOUS at 04:18

## 2018-11-09 VITALS — SYSTOLIC BLOOD PRESSURE: 99 MMHG | DIASTOLIC BLOOD PRESSURE: 49 MMHG

## 2018-11-09 VITALS — SYSTOLIC BLOOD PRESSURE: 91 MMHG | DIASTOLIC BLOOD PRESSURE: 61 MMHG

## 2018-11-09 VITALS — DIASTOLIC BLOOD PRESSURE: 50 MMHG | SYSTOLIC BLOOD PRESSURE: 111 MMHG

## 2018-11-09 VITALS — SYSTOLIC BLOOD PRESSURE: 112 MMHG | DIASTOLIC BLOOD PRESSURE: 59 MMHG

## 2018-11-09 VITALS — DIASTOLIC BLOOD PRESSURE: 61 MMHG | SYSTOLIC BLOOD PRESSURE: 91 MMHG

## 2018-11-09 VITALS — SYSTOLIC BLOOD PRESSURE: 122 MMHG | DIASTOLIC BLOOD PRESSURE: 50 MMHG

## 2018-11-09 VITALS — SYSTOLIC BLOOD PRESSURE: 117 MMHG | DIASTOLIC BLOOD PRESSURE: 49 MMHG

## 2018-11-09 VITALS — SYSTOLIC BLOOD PRESSURE: 111 MMHG | DIASTOLIC BLOOD PRESSURE: 54 MMHG

## 2018-11-09 VITALS — SYSTOLIC BLOOD PRESSURE: 94 MMHG | DIASTOLIC BLOOD PRESSURE: 67 MMHG

## 2018-11-09 VITALS — DIASTOLIC BLOOD PRESSURE: 59 MMHG | SYSTOLIC BLOOD PRESSURE: 110 MMHG

## 2018-11-09 VITALS — SYSTOLIC BLOOD PRESSURE: 86 MMHG | DIASTOLIC BLOOD PRESSURE: 52 MMHG

## 2018-11-09 LAB
ALBUMIN SERPL BCP-MCNC: 2.9 G/DL (ref 3.4–5)
ALBUMIN SERPL BCP-MCNC: 2.9 G/DL (ref 3.4–5)
ALBUMIN/GLOB SERPL: 1 {RATIO} (ref 1.1–1.5)
ALBUMIN/GLOB SERPL: 1 {RATIO} (ref 1.1–1.5)
ALP SERPL-CCNC: 85 IU/L (ref 46–116)
ALP SERPL-CCNC: 95 IU/L (ref 46–116)
ALT SERPL W P-5'-P-CCNC: 103 U/L (ref 12–78)
ALT SERPL W P-5'-P-CCNC: 117 U/L (ref 12–78)
ANION GAP SERPL CALCULATED.3IONS-SCNC: 5 MMOL/L (ref 8–16)
AST SERPL W P-5'-P-CCNC: 37 U/L (ref 10–37)
AST SERPL W P-5'-P-CCNC: 47 U/L (ref 10–37)
BASOPHILS # BLD AUTO: 0 X10'3 (ref 0–0.2)
BASOPHILS NFR BLD AUTO: 0.2 % (ref 0–1)
BILIRUB DIRECT SERPL-MCNC: 0.1 MG/DL (ref 0–0.3)
BILIRUB SERPL-MCNC: 0.4 MG/DL (ref 0.1–1)
BILIRUB SERPL-MCNC: 0.4 MG/DL (ref 0.1–1)
BUN SERPL-MCNC: 38 MG/DL (ref 7–18)
BUN/CREAT SERPL: 20.5 (ref 5.4–32)
CALCIUM SERPL-MCNC: 8.1 MG/DL (ref 8.5–10.1)
CHLORIDE SERPL-SCNC: 94 MMOL/L (ref 99–107)
CHOLEST SERPL-MCNC: 103 MG/DL (ref 0–200)
CHOLEST/HDLC SERPL: 6.9 {RATIO} (ref 0–4.99)
CO2 SERPL-SCNC: 31.4 MMOL/L (ref 24–32)
CREAT SERPL-MCNC: 1.85 MG/DL (ref 0.6–1.1)
EOSINOPHIL # BLD AUTO: 0.2 X10'3 (ref 0–0.9)
EOSINOPHIL NFR BLD AUTO: 2.6 % (ref 0–6)
ERYTHROCYTE [DISTWIDTH] IN BLOOD BY AUTOMATED COUNT: 17.2 % (ref 11.5–14.5)
GFR SERPL CREATININE-BSD FRML MDRD: 37 ML/MIN
GLUCOSE SERPL-MCNC: 107 MG/DL (ref 70–104)
HCT VFR BLD AUTO: 28.1 % (ref 42–52)
HDLC SERPL-MCNC: 15 MG/DL (ref 35–60)
HGB BLD-MCNC: 9.1 G/DL (ref 14–17.9)
LDLC SERPL DIRECT ASSAY-MCNC: 61 MG/DL (ref 50–100)
LYMPHOCYTES # BLD AUTO: 1.1 X10'3 (ref 1.1–4.8)
LYMPHOCYTES NFR BLD AUTO: 15.9 % (ref 21–51)
MAGNESIUM SERPL-MCNC: 1.8 MG/DL (ref 1.5–2.4)
MCH RBC QN AUTO: 25.3 PG (ref 27–31)
MCHC RBC AUTO-ENTMCNC: 32.4 % (ref 33–36.5)
MCV RBC AUTO: 78.3 FL (ref 78–98)
MONOCYTES # BLD AUTO: 0.3 X10'3 (ref 0–0.9)
MONOCYTES NFR BLD AUTO: 4.2 % (ref 2–12)
NEUTROPHILS # BLD AUTO: 5.5 X10'3 (ref 1.8–7.7)
NEUTROPHILS NFR BLD AUTO: 77.1 % (ref 42–75)
PLATELET # BLD AUTO: 73 X10'3 (ref 140–440)
PMV BLD AUTO: 9 FL (ref 7.4–10.4)
POTASSIUM SERPL-SCNC: 3.9 MMOL/L (ref 3.5–5.1)
PROT SERPL-MCNC: 5.8 G/DL (ref 6.4–8.2)
PROT SERPL-MCNC: 5.8 G/DL (ref 6.4–8.2)
RBC # BLD AUTO: 3.59 X10'6 (ref 4.7–6.1)
SODIUM SERPL-SCNC: 130 MMOL/L (ref 135–145)
TRIGL SERPL-MCNC: 159 MG/DL (ref 20–135)
WBC # BLD AUTO: 7.2 X10'3 (ref 4.5–11)

## 2018-11-09 RX ADMIN — AMIODARONE HYDROCHLORIDE SCH MLS/HR: 1.8 INJECTION, SOLUTION INTRAVENOUS at 07:04

## 2018-11-09 RX ADMIN — AMIODARONE HYDROCHLORIDE SCH MLS/HR: 1.8 INJECTION, SOLUTION INTRAVENOUS at 20:36

## 2018-11-09 RX ADMIN — CARVEDILOL SCH MG: 6.25 TABLET, FILM COATED ORAL at 20:19

## 2018-11-09 RX ADMIN — ALBUTEROL SULFATE SCH MG: 2.5 SOLUTION RESPIRATORY (INHALATION) at 15:37

## 2018-11-09 RX ADMIN — CARVEDILOL SCH MG: 6.25 TABLET, FILM COATED ORAL at 09:52

## 2018-11-09 RX ADMIN — BUDESONIDE SCH MG: 0.5 INHALANT RESPIRATORY (INHALATION) at 20:30

## 2018-11-09 RX ADMIN — BUDESONIDE SCH MG: 0.5 INHALANT RESPIRATORY (INHALATION) at 07:24

## 2018-11-09 RX ADMIN — ALBUTEROL SULFATE SCH MG: 2.5 SOLUTION RESPIRATORY (INHALATION) at 07:24

## 2018-11-09 RX ADMIN — HYDROCODONE BITARTRATE AND ACETAMINOPHEN PRN TAB: 5; 325 TABLET ORAL at 09:52

## 2018-11-09 RX ADMIN — DOBUTAMINE IN DEXTROSE SCH MLS/HR: 200 INJECTION, SOLUTION INTRAVENOUS at 09:49

## 2018-11-09 RX ADMIN — AMIODARONE HYDROCHLORIDE SCH MLS/HR: 1.8 INJECTION, SOLUTION INTRAVENOUS at 09:50

## 2018-11-09 RX ADMIN — HYDROCODONE BITARTRATE AND ACETAMINOPHEN PRN TAB: 10; 325 TABLET ORAL at 14:21

## 2018-11-09 RX ADMIN — ALBUTEROL SULFATE SCH MG: 2.5 SOLUTION RESPIRATORY (INHALATION) at 20:30

## 2018-11-09 RX ADMIN — PANTOPRAZOLE SODIUM SCH MG: 40 TABLET, DELAYED RELEASE ORAL at 09:51

## 2018-11-09 RX ADMIN — ALBUTEROL SULFATE SCH MG: 2.5 SOLUTION RESPIRATORY (INHALATION) at 12:02

## 2018-11-09 RX ADMIN — AMIODARONE HYDROCHLORIDE SCH MLS/HR: 1.8 INJECTION, SOLUTION INTRAVENOUS at 14:32

## 2018-11-09 RX ADMIN — DOBUTAMINE IN DEXTROSE SCH MLS/HR: 200 INJECTION, SOLUTION INTRAVENOUS at 06:29

## 2018-11-09 RX ADMIN — SODIUM CHLORIDE SCH MLS/HR: 9 INJECTION INTRAMUSCULAR; INTRAVENOUS; SUBCUTANEOUS at 16:22

## 2018-11-10 VITALS — SYSTOLIC BLOOD PRESSURE: 123 MMHG | DIASTOLIC BLOOD PRESSURE: 56 MMHG

## 2018-11-10 VITALS — DIASTOLIC BLOOD PRESSURE: 56 MMHG | SYSTOLIC BLOOD PRESSURE: 112 MMHG

## 2018-11-10 VITALS — SYSTOLIC BLOOD PRESSURE: 113 MMHG | DIASTOLIC BLOOD PRESSURE: 54 MMHG

## 2018-11-10 VITALS — SYSTOLIC BLOOD PRESSURE: 126 MMHG | DIASTOLIC BLOOD PRESSURE: 83 MMHG

## 2018-11-10 VITALS — DIASTOLIC BLOOD PRESSURE: 54 MMHG | SYSTOLIC BLOOD PRESSURE: 136 MMHG

## 2018-11-10 VITALS — DIASTOLIC BLOOD PRESSURE: 48 MMHG | SYSTOLIC BLOOD PRESSURE: 111 MMHG

## 2018-11-10 VITALS — SYSTOLIC BLOOD PRESSURE: 116 MMHG | DIASTOLIC BLOOD PRESSURE: 81 MMHG

## 2018-11-10 VITALS — SYSTOLIC BLOOD PRESSURE: 132 MMHG | DIASTOLIC BLOOD PRESSURE: 61 MMHG

## 2018-11-10 VITALS — SYSTOLIC BLOOD PRESSURE: 107 MMHG | DIASTOLIC BLOOD PRESSURE: 64 MMHG

## 2018-11-10 VITALS — SYSTOLIC BLOOD PRESSURE: 115 MMHG | DIASTOLIC BLOOD PRESSURE: 53 MMHG

## 2018-11-10 VITALS — SYSTOLIC BLOOD PRESSURE: 108 MMHG | DIASTOLIC BLOOD PRESSURE: 47 MMHG

## 2018-11-10 VITALS — DIASTOLIC BLOOD PRESSURE: 57 MMHG | SYSTOLIC BLOOD PRESSURE: 120 MMHG

## 2018-11-10 VITALS — DIASTOLIC BLOOD PRESSURE: 63 MMHG | SYSTOLIC BLOOD PRESSURE: 111 MMHG

## 2018-11-10 LAB
ALBUMIN SERPL BCP-MCNC: 2.8 G/DL (ref 3.4–5)
ALBUMIN/GLOB SERPL: 0.9 {RATIO} (ref 1.1–1.5)
ALP SERPL-CCNC: 90 IU/L (ref 46–116)
ALT SERPL W P-5'-P-CCNC: 95 U/L (ref 12–78)
ANION GAP SERPL CALCULATED.3IONS-SCNC: 6 MMOL/L (ref 8–16)
ANISOCYTOSIS BLD QL SMEAR: (no result)
AST SERPL W P-5'-P-CCNC: 33 U/L (ref 10–37)
BASOPHILS # BLD AUTO: 0 X10'3 (ref 0–0.2)
BASOPHILS NFR BLD AUTO: 0.2 % (ref 0–1)
BILIRUB SERPL-MCNC: 0.4 MG/DL (ref 0.1–1)
BUN SERPL-MCNC: 32 MG/DL (ref 7–18)
BUN/CREAT SERPL: 19.5 (ref 5.4–32)
CALCIUM SERPL-MCNC: 8.1 MG/DL (ref 8.5–10.1)
CHLORIDE SERPL-SCNC: 91 MMOL/L (ref 99–107)
CO2 SERPL-SCNC: 31.3 MMOL/L (ref 24–32)
CREAT SERPL-MCNC: 1.64 MG/DL (ref 0.6–1.1)
EOSINOPHIL # BLD AUTO: 0.3 X10'3 (ref 0–0.9)
EOSINOPHIL NFR BLD AUTO: 3.6 % (ref 0–6)
ERYTHROCYTE [DISTWIDTH] IN BLOOD BY AUTOMATED COUNT: 17.3 % (ref 11.5–14.5)
GFR SERPL CREATININE-BSD FRML MDRD: 42 ML/MIN
GLUCOSE SERPL-MCNC: 117 MG/DL (ref 70–104)
HCT VFR BLD AUTO: 27.2 % (ref 42–52)
HGB BLD-MCNC: 8.8 G/DL (ref 14–17.9)
HYPOCHROMIA BLD QL SMEAR: (no result)
LYMPHOCYTES # BLD AUTO: 1.5 X10'3 (ref 1.1–4.8)
LYMPHOCYTES NFR BLD AUTO: 18.3 % (ref 21–51)
MACROCYTES BLD QL SMEAR: (no result)
MAGNESIUM SERPL-MCNC: 1.6 MG/DL (ref 1.5–2.4)
MCH RBC QN AUTO: 25.4 PG (ref 27–31)
MCHC RBC AUTO-ENTMCNC: 32.4 % (ref 33–36.5)
MCV RBC AUTO: 78.4 FL (ref 78–98)
MICROCYTES BLD QL SMEAR: (no result)
MONOCYTES # BLD AUTO: 0.7 X10'3 (ref 0–0.9)
MONOCYTES NFR BLD AUTO: 9 % (ref 2–12)
NEUTROPHILS # BLD AUTO: 5.5 X10'3 (ref 1.8–7.7)
NEUTROPHILS NFR BLD AUTO: 68.9 % (ref 42–75)
PLATELET # BLD AUTO: 72 X10'3 (ref 140–440)
PLATELET BLD QL SMEAR: (no result)
PMV BLD AUTO: 10.3 FL (ref 7.4–10.4)
POTASSIUM SERPL-SCNC: 4.5 MMOL/L (ref 3.5–5.1)
PROT SERPL-MCNC: 5.9 G/DL (ref 6.4–8.2)
RBC # BLD AUTO: 3.47 X10'6 (ref 4.7–6.1)
RBC MORPH BLD: (no result)
SODIUM SERPL-SCNC: 128 MMOL/L (ref 135–145)
WBC # BLD AUTO: 8 X10'3 (ref 4.5–11)

## 2018-11-10 RX ADMIN — DOBUTAMINE IN DEXTROSE SCH MLS/HR: 200 INJECTION, SOLUTION INTRAVENOUS at 20:08

## 2018-11-10 RX ADMIN — HYDROCODONE BITARTRATE AND ACETAMINOPHEN PRN TAB: 10; 325 TABLET ORAL at 20:27

## 2018-11-10 RX ADMIN — Medication SCH MG: at 07:56

## 2018-11-10 RX ADMIN — BUDESONIDE SCH MG: 0.5 INHALANT RESPIRATORY (INHALATION) at 07:13

## 2018-11-10 RX ADMIN — ONDANSETRON PRN MG: 2 INJECTION, SOLUTION INTRAMUSCULAR; INTRAVENOUS at 19:06

## 2018-11-10 RX ADMIN — HYDROCODONE BITARTRATE AND ACETAMINOPHEN PRN TAB: 10; 325 TABLET ORAL at 02:34

## 2018-11-10 RX ADMIN — BUDESONIDE SCH MG: 0.5 INHALANT RESPIRATORY (INHALATION) at 19:44

## 2018-11-10 RX ADMIN — Medication PRN MG: at 10:54

## 2018-11-10 RX ADMIN — ALBUTEROL SULFATE SCH MG: 2.5 SOLUTION RESPIRATORY (INHALATION) at 07:13

## 2018-11-10 RX ADMIN — ONDANSETRON PRN MG: 2 INJECTION, SOLUTION INTRAMUSCULAR; INTRAVENOUS at 07:57

## 2018-11-10 RX ADMIN — ALBUTEROL SULFATE SCH MG: 2.5 SOLUTION RESPIRATORY (INHALATION) at 16:04

## 2018-11-10 RX ADMIN — HYDROCODONE BITARTRATE AND ACETAMINOPHEN PRN TAB: 10; 325 TABLET ORAL at 07:59

## 2018-11-10 RX ADMIN — DOBUTAMINE IN DEXTROSE SCH MLS/HR: 200 INJECTION, SOLUTION INTRAVENOUS at 04:01

## 2018-11-10 RX ADMIN — ALBUTEROL SULFATE SCH MG: 2.5 SOLUTION RESPIRATORY (INHALATION) at 19:44

## 2018-11-10 RX ADMIN — CARVEDILOL SCH MG: 6.25 TABLET, FILM COATED ORAL at 07:56

## 2018-11-10 RX ADMIN — HYDROCODONE BITARTRATE AND ACETAMINOPHEN PRN TAB: 10; 325 TABLET ORAL at 15:41

## 2018-11-10 RX ADMIN — PANTOPRAZOLE SODIUM SCH MG: 40 TABLET, DELAYED RELEASE ORAL at 07:56

## 2018-11-10 RX ADMIN — DOBUTAMINE IN DEXTROSE SCH MLS/HR: 200 INJECTION, SOLUTION INTRAVENOUS at 16:55

## 2018-11-10 RX ADMIN — ALBUTEROL SULFATE SCH MG: 2.5 SOLUTION RESPIRATORY (INHALATION) at 10:57

## 2018-11-10 RX ADMIN — Medication PRN MG: at 02:32

## 2018-11-10 RX ADMIN — CARVEDILOL SCH MG: 6.25 TABLET, FILM COATED ORAL at 20:25

## 2018-11-11 VITALS — SYSTOLIC BLOOD PRESSURE: 94 MMHG | DIASTOLIC BLOOD PRESSURE: 54 MMHG

## 2018-11-11 VITALS — DIASTOLIC BLOOD PRESSURE: 69 MMHG | SYSTOLIC BLOOD PRESSURE: 110 MMHG

## 2018-11-11 VITALS — SYSTOLIC BLOOD PRESSURE: 114 MMHG | DIASTOLIC BLOOD PRESSURE: 73 MMHG

## 2018-11-11 VITALS — SYSTOLIC BLOOD PRESSURE: 89 MMHG | DIASTOLIC BLOOD PRESSURE: 56 MMHG

## 2018-11-11 LAB
ALBUMIN SERPL BCP-MCNC: 2.9 G/DL (ref 3.4–5)
ALBUMIN/GLOB SERPL: 0.9 {RATIO} (ref 1.1–1.5)
ALP SERPL-CCNC: 91 IU/L (ref 46–116)
ALT SERPL W P-5'-P-CCNC: 82 U/L (ref 12–78)
ANION GAP SERPL CALCULATED.3IONS-SCNC: 3 MMOL/L (ref 8–16)
AST SERPL W P-5'-P-CCNC: 25 U/L (ref 10–37)
BASOPHILS # BLD AUTO: 0 X10'3 (ref 0–0.2)
BASOPHILS NFR BLD AUTO: 0.4 % (ref 0–1)
BILIRUB SERPL-MCNC: 0.5 MG/DL (ref 0.1–1)
BUN SERPL-MCNC: 25 MG/DL (ref 7–18)
BUN/CREAT SERPL: 18.5 (ref 5.4–32)
CALCIUM SERPL-MCNC: 8.3 MG/DL (ref 8.5–10.1)
CHLORIDE SERPL-SCNC: 91 MMOL/L (ref 99–107)
CO2 SERPL-SCNC: 32.6 MMOL/L (ref 24–32)
CREAT SERPL-MCNC: 1.35 MG/DL (ref 0.6–1.1)
EOSINOPHIL # BLD AUTO: 0.2 X10'3 (ref 0–0.9)
EOSINOPHIL NFR BLD AUTO: 3.3 % (ref 0–6)
ERYTHROCYTE [DISTWIDTH] IN BLOOD BY AUTOMATED COUNT: 16.4 % (ref 11.5–14.5)
GFR SERPL CREATININE-BSD FRML MDRD: 53 ML/MIN
GLUCOSE SERPL-MCNC: 103 MG/DL (ref 70–104)
HCT VFR BLD AUTO: 26 % (ref 42–52)
HGB BLD-MCNC: 8.3 G/DL (ref 14–17.9)
LYMPHOCYTES # BLD AUTO: 1.3 X10'3 (ref 1.1–4.8)
LYMPHOCYTES NFR BLD AUTO: 21.4 % (ref 21–51)
MAGNESIUM SERPL-MCNC: 1.7 MG/DL (ref 1.5–2.4)
MCH RBC QN AUTO: 25 PG (ref 27–31)
MCHC RBC AUTO-ENTMCNC: 31.9 % (ref 33–36.5)
MCV RBC AUTO: 78.6 FL (ref 78–98)
MONOCYTES # BLD AUTO: 0.6 X10'3 (ref 0–0.9)
MONOCYTES NFR BLD AUTO: 9.3 % (ref 2–12)
NEUTROPHILS # BLD AUTO: 4 X10'3 (ref 1.8–7.7)
NEUTROPHILS NFR BLD AUTO: 65.6 % (ref 42–75)
PLATELET # BLD AUTO: 66 X10'3 (ref 140–440)
PMV BLD AUTO: 10 FL (ref 7.4–10.4)
POTASSIUM SERPL-SCNC: 4.7 MMOL/L (ref 3.5–5.1)
PROT SERPL-MCNC: 6.1 G/DL (ref 6.4–8.2)
RBC # BLD AUTO: 3.31 X10'6 (ref 4.7–6.1)
SODIUM SERPL-SCNC: 127 MMOL/L (ref 135–145)
WBC # BLD AUTO: 6.1 X10'3 (ref 4.5–11)

## 2018-11-11 RX ADMIN — ALBUTEROL SULFATE SCH MG: 2.5 SOLUTION RESPIRATORY (INHALATION) at 07:39

## 2018-11-11 RX ADMIN — HYDROCODONE BITARTRATE AND ACETAMINOPHEN PRN TAB: 10; 325 TABLET ORAL at 01:40

## 2018-11-11 RX ADMIN — HYDROCODONE BITARTRATE AND ACETAMINOPHEN PRN TAB: 10; 325 TABLET ORAL at 11:45

## 2018-11-11 RX ADMIN — BUDESONIDE SCH MG: 0.5 INHALANT RESPIRATORY (INHALATION) at 07:39

## 2018-11-11 RX ADMIN — IPRATROPIUM BROMIDE AND ALBUTEROL SULFATE PRN ML: .5; 3 SOLUTION RESPIRATORY (INHALATION) at 05:34

## 2018-11-11 RX ADMIN — PANTOPRAZOLE SODIUM SCH MG: 40 TABLET, DELAYED RELEASE ORAL at 07:50

## 2018-11-11 RX ADMIN — HYDROCODONE BITARTRATE AND ACETAMINOPHEN PRN TAB: 10; 325 TABLET ORAL at 07:57

## 2018-11-11 RX ADMIN — Medication SCH MG: at 07:52

## 2018-11-11 RX ADMIN — CARVEDILOL SCH MG: 6.25 TABLET, FILM COATED ORAL at 07:51

## 2018-11-11 RX ADMIN — ALBUTEROL SULFATE SCH MG: 2.5 SOLUTION RESPIRATORY (INHALATION) at 11:17

## 2018-11-12 LAB
HCT VFR BLD CALC: 30 %PCV (ref 42–52)
HCT VFR BLD CALC: 30 %PCV (ref 42–52)
HGB BLD CALC-MCNC: 10.2 G/DL (ref 14–18)
SAO2 % BLDA FROM PO2: 96 % (ref 95–98)
SAO2 % BLDV FROM PO2: 45 % (ref 60–80)
SPECIMEN SOURCE: (no result)

## 2018-11-17 ENCOUNTER — HOSPITAL ENCOUNTER (INPATIENT)
Dept: HOSPITAL 94 - ER | Age: 68
LOS: 5 days | Discharge: HOME | DRG: 377 | End: 2018-11-22
Attending: GENERAL PRACTICE | Admitting: FAMILY MEDICINE
Payer: MEDICARE

## 2018-11-17 VITALS — DIASTOLIC BLOOD PRESSURE: 49 MMHG | SYSTOLIC BLOOD PRESSURE: 79 MMHG

## 2018-11-17 VITALS — BODY MASS INDEX: 35 KG/M2 | HEIGHT: 74 IN | WEIGHT: 272.71 LBS

## 2018-11-17 DIAGNOSIS — Z79.51: ICD-10-CM

## 2018-11-17 DIAGNOSIS — B33.24: ICD-10-CM

## 2018-11-17 DIAGNOSIS — I95.9: ICD-10-CM

## 2018-11-17 DIAGNOSIS — F32.9: ICD-10-CM

## 2018-11-17 DIAGNOSIS — C91.11: ICD-10-CM

## 2018-11-17 DIAGNOSIS — M19.90: ICD-10-CM

## 2018-11-17 DIAGNOSIS — Z99.81: ICD-10-CM

## 2018-11-17 DIAGNOSIS — N18.9: ICD-10-CM

## 2018-11-17 DIAGNOSIS — I86.4: ICD-10-CM

## 2018-11-17 DIAGNOSIS — I50.43: ICD-10-CM

## 2018-11-17 DIAGNOSIS — J44.9: ICD-10-CM

## 2018-11-17 DIAGNOSIS — N17.9: ICD-10-CM

## 2018-11-17 DIAGNOSIS — Z82.49: ICD-10-CM

## 2018-11-17 DIAGNOSIS — I47.2: ICD-10-CM

## 2018-11-17 DIAGNOSIS — Z80.51: ICD-10-CM

## 2018-11-17 DIAGNOSIS — B37.81: ICD-10-CM

## 2018-11-17 DIAGNOSIS — Z79.01: ICD-10-CM

## 2018-11-17 DIAGNOSIS — I25.10: ICD-10-CM

## 2018-11-17 DIAGNOSIS — F17.210: ICD-10-CM

## 2018-11-17 DIAGNOSIS — D62: ICD-10-CM

## 2018-11-17 DIAGNOSIS — K21.9: ICD-10-CM

## 2018-11-17 DIAGNOSIS — R59.0: ICD-10-CM

## 2018-11-17 DIAGNOSIS — Z79.899: ICD-10-CM

## 2018-11-17 DIAGNOSIS — I13.0: ICD-10-CM

## 2018-11-17 DIAGNOSIS — I48.0: ICD-10-CM

## 2018-11-17 DIAGNOSIS — F41.9: ICD-10-CM

## 2018-11-17 DIAGNOSIS — J96.11: ICD-10-CM

## 2018-11-17 DIAGNOSIS — K92.2: Primary | ICD-10-CM

## 2018-11-17 LAB
ALBUMIN SERPL BCP-MCNC: 3.2 G/DL (ref 3.4–5)
ALBUMIN/GLOB SERPL: 1 {RATIO} (ref 1.1–1.5)
ALP SERPL-CCNC: 91 IU/L (ref 46–116)
ALT SERPL W P-5'-P-CCNC: 29 U/L (ref 12–78)
ANION GAP SERPL CALCULATED.3IONS-SCNC: 7 MMOL/L (ref 8–16)
APTT PPP: 28 SECONDS (ref 22–32)
AST SERPL W P-5'-P-CCNC: 12 U/L (ref 10–37)
BASOPHILS # BLD AUTO: 0.1 X10'3 (ref 0–0.2)
BASOPHILS NFR BLD AUTO: 0.6 % (ref 0–1)
BILIRUB SERPL-MCNC: 0.6 MG/DL (ref 0.1–1)
BUN SERPL-MCNC: 24 MG/DL (ref 7–18)
BUN/CREAT SERPL: 15.6 (ref 5.4–32)
CALCIUM SERPL-MCNC: 8.2 MG/DL (ref 8.5–10.1)
CHLORIDE SERPL-SCNC: 97 MMOL/L (ref 99–107)
CO2 SERPL-SCNC: 30.8 MMOL/L (ref 24–32)
CREAT SERPL-MCNC: 1.54 MG/DL (ref 0.6–1.1)
D DIMER PPP FEU-MCNC: 1.27 MG/L FEU (ref 0–0.5)
EOSINOPHIL # BLD AUTO: 0.3 X10'3 (ref 0–0.9)
EOSINOPHIL NFR BLD AUTO: 2.5 % (ref 0–6)
ERYTHROCYTE [DISTWIDTH] IN BLOOD BY AUTOMATED COUNT: 17.9 % (ref 11.5–14.5)
GFR SERPL CREATININE-BSD FRML MDRD: 45 ML/MIN
GLUCOSE SERPL-MCNC: 142 MG/DL (ref 70–104)
HCT VFR BLD AUTO: 25.4 % (ref 42–52)
HGB BLD-MCNC: 7.9 G/DL (ref 14–17.9)
INR PPP: 1.2 INR
LIPASE SERPL-CCNC: 70 U/L (ref 73–393)
LYMPHOCYTES # BLD AUTO: 2.9 X10'3 (ref 1.1–4.8)
LYMPHOCYTES NFR BLD AUTO: 26.8 % (ref 21–51)
MAGNESIUM SERPL-MCNC: 1.7 MG/DL (ref 1.5–2.4)
MCH RBC QN AUTO: 25 PG (ref 27–31)
MCHC RBC AUTO-ENTMCNC: 31.3 % (ref 33–36.5)
MCV RBC AUTO: 80 FL (ref 78–98)
MONOCYTES # BLD AUTO: 0.6 X10'3 (ref 0–0.9)
MONOCYTES NFR BLD AUTO: 5.2 % (ref 2–12)
NEUTROPHILS # BLD AUTO: 7 X10'3 (ref 1.8–7.7)
NEUTROPHILS NFR BLD AUTO: 64.9 % (ref 42–75)
PHOSPHATE SERPL-MCNC: 3.9 MG/DL (ref 2.3–4.5)
PLATELET # BLD AUTO: 127 X10'3 (ref 140–440)
PMV BLD AUTO: 9 FL (ref 7.4–10.4)
POTASSIUM SERPL-SCNC: 4.1 MMOL/L (ref 3.5–5.1)
PROT SERPL-MCNC: 6.4 G/DL (ref 6.4–8.2)
PROTHROMBIN TIME: 11.6 SECONDS (ref 9–12)
RBC # BLD AUTO: 3.17 X10'6 (ref 4.7–6.1)
SODIUM SERPL-SCNC: 135 MMOL/L (ref 135–145)
WBC # BLD AUTO: 10.8 X10'3 (ref 4.5–11)

## 2018-11-17 PROCEDURE — 85025 COMPLETE CBC W/AUTO DIFF WBC: CPT

## 2018-11-17 PROCEDURE — 94640 AIRWAY INHALATION TREATMENT: CPT

## 2018-11-17 PROCEDURE — 86900 BLOOD TYPING SEROLOGIC ABO: CPT

## 2018-11-17 PROCEDURE — 85379 FIBRIN DEGRADATION QUANT: CPT

## 2018-11-17 PROCEDURE — 86885 COOMBS TEST INDIRECT QUAL: CPT

## 2018-11-17 PROCEDURE — 85730 THROMBOPLASTIN TIME PARTIAL: CPT

## 2018-11-17 PROCEDURE — 86920 COMPATIBILITY TEST SPIN: CPT

## 2018-11-17 PROCEDURE — 93005 ELECTROCARDIOGRAM TRACING: CPT

## 2018-11-17 PROCEDURE — 85610 PROTHROMBIN TIME: CPT

## 2018-11-17 PROCEDURE — 86901 BLOOD TYPING SEROLOGIC RH(D): CPT

## 2018-11-17 PROCEDURE — 83690 ASSAY OF LIPASE: CPT

## 2018-11-17 PROCEDURE — 83605 ASSAY OF LACTIC ACID: CPT

## 2018-11-17 PROCEDURE — 71045 X-RAY EXAM CHEST 1 VIEW: CPT

## 2018-11-17 PROCEDURE — 71250 CT THORAX DX C-: CPT

## 2018-11-17 PROCEDURE — 87040 BLOOD CULTURE FOR BACTERIA: CPT

## 2018-11-17 PROCEDURE — 74160 CT ABDOMEN W/CONTRAST: CPT

## 2018-11-17 PROCEDURE — 97530 THERAPEUTIC ACTIVITIES: CPT

## 2018-11-17 PROCEDURE — 74176 CT ABD & PELVIS W/O CONTRAST: CPT

## 2018-11-17 PROCEDURE — 99285 EMERGENCY DEPT VISIT HI MDM: CPT

## 2018-11-17 PROCEDURE — 87070 CULTURE OTHR SPECIMN AEROBIC: CPT

## 2018-11-17 PROCEDURE — 36415 COLL VENOUS BLD VENIPUNCTURE: CPT

## 2018-11-17 PROCEDURE — 84484 ASSAY OF TROPONIN QUANT: CPT

## 2018-11-17 PROCEDURE — 82272 OCCULT BLD FECES 1-3 TESTS: CPT

## 2018-11-17 PROCEDURE — 83735 ASSAY OF MAGNESIUM: CPT

## 2018-11-17 PROCEDURE — 43239 EGD BIOPSY SINGLE/MULTIPLE: CPT

## 2018-11-17 PROCEDURE — 83880 ASSAY OF NATRIURETIC PEPTIDE: CPT

## 2018-11-17 PROCEDURE — 99152 MOD SED SAME PHYS/QHP 5/>YRS: CPT

## 2018-11-17 PROCEDURE — 36430 TRANSFUSION BLD/BLD COMPNT: CPT

## 2018-11-17 PROCEDURE — 97116 GAIT TRAINING THERAPY: CPT

## 2018-11-17 PROCEDURE — 80053 COMPREHEN METABOLIC PANEL: CPT

## 2018-11-17 PROCEDURE — 97162 PT EVAL MOD COMPLEX 30 MIN: CPT

## 2018-11-17 PROCEDURE — 84100 ASSAY OF PHOSPHORUS: CPT

## 2018-11-17 PROCEDURE — 94760 N-INVAS EAR/PLS OXIMETRY 1: CPT

## 2018-11-17 RX ADMIN — DOPAMINE HYDROCHLORIDE IN DEXTROSE SCH MLS/HR: 1.6 INJECTION, SOLUTION INTRAVENOUS at 23:50

## 2018-11-18 VITALS — DIASTOLIC BLOOD PRESSURE: 51 MMHG | SYSTOLIC BLOOD PRESSURE: 95 MMHG

## 2018-11-18 VITALS — DIASTOLIC BLOOD PRESSURE: 39 MMHG | SYSTOLIC BLOOD PRESSURE: 112 MMHG

## 2018-11-18 VITALS — DIASTOLIC BLOOD PRESSURE: 56 MMHG | SYSTOLIC BLOOD PRESSURE: 90 MMHG

## 2018-11-18 VITALS — SYSTOLIC BLOOD PRESSURE: 95 MMHG | DIASTOLIC BLOOD PRESSURE: 52 MMHG

## 2018-11-18 VITALS — SYSTOLIC BLOOD PRESSURE: 96 MMHG | DIASTOLIC BLOOD PRESSURE: 55 MMHG

## 2018-11-18 VITALS — DIASTOLIC BLOOD PRESSURE: 48 MMHG | SYSTOLIC BLOOD PRESSURE: 84 MMHG

## 2018-11-18 VITALS — DIASTOLIC BLOOD PRESSURE: 47 MMHG | SYSTOLIC BLOOD PRESSURE: 81 MMHG

## 2018-11-18 VITALS — SYSTOLIC BLOOD PRESSURE: 85 MMHG | DIASTOLIC BLOOD PRESSURE: 52 MMHG

## 2018-11-18 VITALS — SYSTOLIC BLOOD PRESSURE: 100 MMHG | DIASTOLIC BLOOD PRESSURE: 62 MMHG

## 2018-11-18 VITALS — SYSTOLIC BLOOD PRESSURE: 92 MMHG | DIASTOLIC BLOOD PRESSURE: 59 MMHG

## 2018-11-18 VITALS — DIASTOLIC BLOOD PRESSURE: 56 MMHG | SYSTOLIC BLOOD PRESSURE: 101 MMHG

## 2018-11-18 VITALS — SYSTOLIC BLOOD PRESSURE: 92 MMHG | DIASTOLIC BLOOD PRESSURE: 50 MMHG

## 2018-11-18 VITALS — SYSTOLIC BLOOD PRESSURE: 105 MMHG | DIASTOLIC BLOOD PRESSURE: 60 MMHG

## 2018-11-18 VITALS — DIASTOLIC BLOOD PRESSURE: 54 MMHG | SYSTOLIC BLOOD PRESSURE: 94 MMHG

## 2018-11-18 VITALS — SYSTOLIC BLOOD PRESSURE: 100 MMHG | DIASTOLIC BLOOD PRESSURE: 54 MMHG

## 2018-11-18 VITALS — SYSTOLIC BLOOD PRESSURE: 94 MMHG | DIASTOLIC BLOOD PRESSURE: 51 MMHG

## 2018-11-18 VITALS — DIASTOLIC BLOOD PRESSURE: 51 MMHG | SYSTOLIC BLOOD PRESSURE: 94 MMHG

## 2018-11-18 VITALS — SYSTOLIC BLOOD PRESSURE: 94 MMHG | DIASTOLIC BLOOD PRESSURE: 53 MMHG

## 2018-11-18 VITALS — DIASTOLIC BLOOD PRESSURE: 54 MMHG | SYSTOLIC BLOOD PRESSURE: 99 MMHG

## 2018-11-18 VITALS — SYSTOLIC BLOOD PRESSURE: 92 MMHG | DIASTOLIC BLOOD PRESSURE: 56 MMHG

## 2018-11-18 VITALS — DIASTOLIC BLOOD PRESSURE: 61 MMHG | SYSTOLIC BLOOD PRESSURE: 108 MMHG

## 2018-11-18 VITALS — SYSTOLIC BLOOD PRESSURE: 98 MMHG | DIASTOLIC BLOOD PRESSURE: 57 MMHG

## 2018-11-18 VITALS — DIASTOLIC BLOOD PRESSURE: 45 MMHG | SYSTOLIC BLOOD PRESSURE: 84 MMHG

## 2018-11-18 VITALS — DIASTOLIC BLOOD PRESSURE: 55 MMHG | SYSTOLIC BLOOD PRESSURE: 94 MMHG

## 2018-11-18 VITALS — DIASTOLIC BLOOD PRESSURE: 53 MMHG | SYSTOLIC BLOOD PRESSURE: 95 MMHG

## 2018-11-18 VITALS — DIASTOLIC BLOOD PRESSURE: 57 MMHG | SYSTOLIC BLOOD PRESSURE: 92 MMHG

## 2018-11-18 VITALS — DIASTOLIC BLOOD PRESSURE: 62 MMHG | SYSTOLIC BLOOD PRESSURE: 107 MMHG

## 2018-11-18 VITALS — DIASTOLIC BLOOD PRESSURE: 50 MMHG | SYSTOLIC BLOOD PRESSURE: 92 MMHG

## 2018-11-18 VITALS — DIASTOLIC BLOOD PRESSURE: 52 MMHG | SYSTOLIC BLOOD PRESSURE: 93 MMHG

## 2018-11-18 VITALS — DIASTOLIC BLOOD PRESSURE: 51 MMHG | SYSTOLIC BLOOD PRESSURE: 98 MMHG

## 2018-11-18 VITALS — DIASTOLIC BLOOD PRESSURE: 58 MMHG | SYSTOLIC BLOOD PRESSURE: 94 MMHG

## 2018-11-18 LAB
ALBUMIN SERPL BCP-MCNC: 3.2 G/DL (ref 3.4–5)
ALBUMIN/GLOB SERPL: 1 {RATIO} (ref 1.1–1.5)
ALP SERPL-CCNC: 83 IU/L (ref 46–116)
ALT SERPL W P-5'-P-CCNC: 27 U/L (ref 12–78)
ANION GAP SERPL CALCULATED.3IONS-SCNC: 7 MMOL/L (ref 8–16)
AST SERPL W P-5'-P-CCNC: 11 U/L (ref 10–37)
BASOPHILS # BLD AUTO: 0 X10'3 (ref 0–0.2)
BASOPHILS # BLD AUTO: 0 X10'3 (ref 0–0.2)
BASOPHILS NFR BLD AUTO: 0.2 % (ref 0–1)
BASOPHILS NFR BLD AUTO: 0.5 % (ref 0–1)
BILIRUB SERPL-MCNC: 0.4 MG/DL (ref 0.1–1)
BUN SERPL-MCNC: 26 MG/DL (ref 7–18)
BUN/CREAT SERPL: 16.6 (ref 5.4–32)
CALCIUM SERPL-MCNC: 8.1 MG/DL (ref 8.5–10.1)
CHLORIDE SERPL-SCNC: 98 MMOL/L (ref 99–107)
CO2 SERPL-SCNC: 29.9 MMOL/L (ref 24–32)
CREAT SERPL-MCNC: 1.57 MG/DL (ref 0.6–1.1)
EOSINOPHIL # BLD AUTO: 0.2 X10'3 (ref 0–0.9)
EOSINOPHIL # BLD AUTO: 0.2 X10'3 (ref 0–0.9)
EOSINOPHIL NFR BLD AUTO: 2.3 % (ref 0–6)
EOSINOPHIL NFR BLD AUTO: 2.9 % (ref 0–6)
ERYTHROCYTE [DISTWIDTH] IN BLOOD BY AUTOMATED COUNT: 17.5 % (ref 11.5–14.5)
ERYTHROCYTE [DISTWIDTH] IN BLOOD BY AUTOMATED COUNT: 17.8 % (ref 11.5–14.5)
GFR SERPL CREATININE-BSD FRML MDRD: 44 ML/MIN
GLUCOSE SERPL-MCNC: 118 MG/DL (ref 70–104)
HCT VFR BLD AUTO: 23 % (ref 42–52)
HCT VFR BLD AUTO: 26.4 % (ref 42–52)
HGB BLD-MCNC: 7.2 G/DL (ref 14–17.9)
HGB BLD-MCNC: 8.4 G/DL (ref 14–17.9)
LYMPHOCYTES # BLD AUTO: 2 X10'3 (ref 1.1–4.8)
LYMPHOCYTES # BLD AUTO: 2 X10'3 (ref 1.1–4.8)
LYMPHOCYTES NFR BLD AUTO: 24.4 % (ref 21–51)
LYMPHOCYTES NFR BLD AUTO: 26.3 % (ref 21–51)
MCH RBC QN AUTO: 24.9 PG (ref 27–31)
MCH RBC QN AUTO: 25.8 PG (ref 27–31)
MCHC RBC AUTO-ENTMCNC: 31.4 % (ref 33–36.5)
MCHC RBC AUTO-ENTMCNC: 31.9 % (ref 33–36.5)
MCV RBC AUTO: 79.4 FL (ref 78–98)
MCV RBC AUTO: 80.9 FL (ref 78–98)
MONOCYTES # BLD AUTO: 0.3 X10'3 (ref 0–0.9)
MONOCYTES # BLD AUTO: 0.5 X10'3 (ref 0–0.9)
MONOCYTES NFR BLD AUTO: 4.2 % (ref 2–12)
MONOCYTES NFR BLD AUTO: 6.3 % (ref 2–12)
NEUTROPHILS # BLD AUTO: 4.9 X10'3 (ref 1.8–7.7)
NEUTROPHILS # BLD AUTO: 5.6 X10'3 (ref 1.8–7.7)
NEUTROPHILS NFR BLD AUTO: 64 % (ref 42–75)
NEUTROPHILS NFR BLD AUTO: 68.9 % (ref 42–75)
PLATELET # BLD AUTO: 111 X10'3 (ref 140–440)
PLATELET # BLD AUTO: 96 X10'3 (ref 140–440)
PMV BLD AUTO: 8.6 FL (ref 7.4–10.4)
PMV BLD AUTO: 9.4 FL (ref 7.4–10.4)
POTASSIUM SERPL-SCNC: 4.3 MMOL/L (ref 3.5–5.1)
PROT SERPL-MCNC: 6.3 G/DL (ref 6.4–8.2)
RBC # BLD AUTO: 2.89 X10'6 (ref 4.7–6.1)
RBC # BLD AUTO: 3.27 X10'6 (ref 4.7–6.1)
SODIUM SERPL-SCNC: 135 MMOL/L (ref 135–145)
WBC # BLD AUTO: 7.7 X10'3 (ref 4.5–11)
WBC # BLD AUTO: 8.2 X10'3 (ref 4.5–11)

## 2018-11-18 PROCEDURE — 30233N1 TRANSFUSION OF NONAUTOLOGOUS RED BLOOD CELLS INTO PERIPHERAL VEIN, PERCUTANEOUS APPROACH: ICD-10-PCS | Performed by: GENERAL PRACTICE

## 2018-11-18 RX ADMIN — ONDANSETRON PRN MG: 2 INJECTION, SOLUTION INTRAMUSCULAR; INTRAVENOUS at 05:41

## 2018-11-18 RX ADMIN — ALBUTEROL SULFATE SCH MG: 2.5 SOLUTION RESPIRATORY (INHALATION) at 19:16

## 2018-11-18 RX ADMIN — HYDROCODONE BITARTRATE AND ACETAMINOPHEN PRN TAB: 10; 325 TABLET ORAL at 21:43

## 2018-11-18 RX ADMIN — HYDROCODONE BITARTRATE AND ACETAMINOPHEN PRN TAB: 10; 325 TABLET ORAL at 11:24

## 2018-11-18 RX ADMIN — BUDESONIDE SCH MG: 0.5 INHALANT RESPIRATORY (INHALATION) at 19:16

## 2018-11-18 RX ADMIN — DOCUSATE SODIUM SCH MG: 100 CAPSULE, LIQUID FILLED ORAL at 08:05

## 2018-11-18 RX ADMIN — CARVEDILOL SCH MG: 6.25 TABLET, FILM COATED ORAL at 08:05

## 2018-11-18 RX ADMIN — DOPAMINE HYDROCHLORIDE IN DEXTROSE SCH MLS/HR: 1.6 INJECTION, SOLUTION INTRAVENOUS at 01:32

## 2018-11-18 RX ADMIN — ALBUTEROL SULFATE SCH MG: 2.5 SOLUTION RESPIRATORY (INHALATION) at 07:00

## 2018-11-18 RX ADMIN — DOPAMINE HYDROCHLORIDE IN DEXTROSE SCH MLS/HR: 1.6 INJECTION, SOLUTION INTRAVENOUS at 13:21

## 2018-11-18 RX ADMIN — HYDROCODONE BITARTRATE AND ACETAMINOPHEN PRN TAB: 10; 325 TABLET ORAL at 16:06

## 2018-11-18 RX ADMIN — IPRATROPIUM BROMIDE AND ALBUTEROL SULFATE PRN ML: .5; 3 SOLUTION RESPIRATORY (INHALATION) at 07:07

## 2018-11-18 RX ADMIN — BUDESONIDE SCH MG: 0.5 INHALANT RESPIRATORY (INHALATION) at 07:08

## 2018-11-18 RX ADMIN — ALBUTEROL SULFATE SCH MG: 2.5 SOLUTION RESPIRATORY (INHALATION) at 10:47

## 2018-11-18 RX ADMIN — DOBUTAMINE IN DEXTROSE SCH MLS/HR: 200 INJECTION, SOLUTION INTRAVENOUS at 14:12

## 2018-11-18 RX ADMIN — ALBUTEROL SULFATE SCH MG: 2.5 SOLUTION RESPIRATORY (INHALATION) at 16:43

## 2018-11-18 RX ADMIN — CARVEDILOL SCH MG: 6.25 TABLET, FILM COATED ORAL at 21:42

## 2018-11-18 RX ADMIN — DOCUSATE SODIUM SCH MG: 100 CAPSULE, LIQUID FILLED ORAL at 21:42

## 2018-11-18 RX ADMIN — IPRATROPIUM BROMIDE AND ALBUTEROL SULFATE PRN ML: .5; 3 SOLUTION RESPIRATORY (INHALATION) at 03:46

## 2018-11-19 VITALS — DIASTOLIC BLOOD PRESSURE: 62 MMHG | SYSTOLIC BLOOD PRESSURE: 99 MMHG

## 2018-11-19 VITALS — DIASTOLIC BLOOD PRESSURE: 65 MMHG | SYSTOLIC BLOOD PRESSURE: 105 MMHG

## 2018-11-19 VITALS — SYSTOLIC BLOOD PRESSURE: 122 MMHG | DIASTOLIC BLOOD PRESSURE: 55 MMHG

## 2018-11-19 VITALS — DIASTOLIC BLOOD PRESSURE: 60 MMHG | SYSTOLIC BLOOD PRESSURE: 103 MMHG

## 2018-11-19 VITALS — DIASTOLIC BLOOD PRESSURE: 70 MMHG | SYSTOLIC BLOOD PRESSURE: 107 MMHG

## 2018-11-19 VITALS — DIASTOLIC BLOOD PRESSURE: 59 MMHG | SYSTOLIC BLOOD PRESSURE: 106 MMHG

## 2018-11-19 VITALS — DIASTOLIC BLOOD PRESSURE: 59 MMHG | SYSTOLIC BLOOD PRESSURE: 96 MMHG

## 2018-11-19 VITALS — SYSTOLIC BLOOD PRESSURE: 103 MMHG | DIASTOLIC BLOOD PRESSURE: 56 MMHG

## 2018-11-19 VITALS — SYSTOLIC BLOOD PRESSURE: 102 MMHG | DIASTOLIC BLOOD PRESSURE: 61 MMHG

## 2018-11-19 LAB
ALBUMIN SERPL BCP-MCNC: 3.1 G/DL (ref 3.4–5)
ALBUMIN/GLOB SERPL: 1 {RATIO} (ref 1.1–1.5)
ALP SERPL-CCNC: 92 IU/L (ref 46–116)
ALT SERPL W P-5'-P-CCNC: 23 U/L (ref 12–78)
ANION GAP SERPL CALCULATED.3IONS-SCNC: 5 MMOL/L (ref 8–16)
AST SERPL W P-5'-P-CCNC: 11 U/L (ref 10–37)
BASOPHILS # BLD AUTO: 0 X10'3 (ref 0–0.2)
BASOPHILS NFR BLD AUTO: 0.2 % (ref 0–1)
BILIRUB SERPL-MCNC: 0.6 MG/DL (ref 0.1–1)
BUN SERPL-MCNC: 21 MG/DL (ref 7–18)
BUN/CREAT SERPL: 14.4 (ref 5.4–32)
CALCIUM SERPL-MCNC: 7.9 MG/DL (ref 8.5–10.1)
CHLORIDE SERPL-SCNC: 94 MMOL/L (ref 99–107)
CO2 SERPL-SCNC: 31.3 MMOL/L (ref 24–32)
CREAT SERPL-MCNC: 1.46 MG/DL (ref 0.6–1.1)
EOSINOPHIL # BLD AUTO: 0.3 X10'3 (ref 0–0.9)
EOSINOPHIL NFR BLD AUTO: 3.6 % (ref 0–6)
ERYTHROCYTE [DISTWIDTH] IN BLOOD BY AUTOMATED COUNT: 17.9 % (ref 11.5–14.5)
GFR SERPL CREATININE-BSD FRML MDRD: 48 ML/MIN
GLUCOSE SERPL-MCNC: 118 MG/DL (ref 70–104)
HCT VFR BLD AUTO: 26.9 % (ref 42–52)
HEMOCCULT STL QL: POSITIVE
HGB BLD-MCNC: 8.5 G/DL (ref 14–17.9)
LYMPHOCYTES # BLD AUTO: 1.7 X10'3 (ref 1.1–4.8)
LYMPHOCYTES NFR BLD AUTO: 19.4 % (ref 21–51)
MCH RBC QN AUTO: 25.6 PG (ref 27–31)
MCHC RBC AUTO-ENTMCNC: 31.7 % (ref 33–36.5)
MCV RBC AUTO: 80.8 FL (ref 78–98)
MONOCYTES # BLD AUTO: 0.5 X10'3 (ref 0–0.9)
MONOCYTES NFR BLD AUTO: 6.1 % (ref 2–12)
NEUTROPHILS # BLD AUTO: 6.3 X10'3 (ref 1.8–7.7)
NEUTROPHILS NFR BLD AUTO: 70.7 % (ref 42–75)
PLATELET # BLD AUTO: 104 X10'3 (ref 140–440)
PMV BLD AUTO: 9.5 FL (ref 7.4–10.4)
POTASSIUM SERPL-SCNC: 4.3 MMOL/L (ref 3.5–5.1)
PROT SERPL-MCNC: 6.2 G/DL (ref 6.4–8.2)
RBC # BLD AUTO: 3.33 X10'6 (ref 4.7–6.1)
SODIUM SERPL-SCNC: 130 MMOL/L (ref 135–145)
WBC # BLD AUTO: 8.9 X10'3 (ref 4.5–11)

## 2018-11-19 PROCEDURE — 0DJ08ZZ INSPECTION OF UPPER INTESTINAL TRACT, VIA NATURAL OR ARTIFICIAL OPENING ENDOSCOPIC: ICD-10-PCS | Performed by: SPECIALIST

## 2018-11-19 RX ADMIN — ALBUTEROL SULFATE SCH MG: 2.5 SOLUTION RESPIRATORY (INHALATION) at 19:35

## 2018-11-19 RX ADMIN — ALBUTEROL SULFATE SCH MG: 2.5 SOLUTION RESPIRATORY (INHALATION) at 14:11

## 2018-11-19 RX ADMIN — CARVEDILOL SCH MG: 6.25 TABLET, FILM COATED ORAL at 19:50

## 2018-11-19 RX ADMIN — DOBUTAMINE IN DEXTROSE SCH MLS/HR: 200 INJECTION, SOLUTION INTRAVENOUS at 14:40

## 2018-11-19 RX ADMIN — METOCLOPRAMIDE PRN MG: 5 INJECTION, SOLUTION INTRAMUSCULAR; INTRAVENOUS at 03:17

## 2018-11-19 RX ADMIN — BUDESONIDE SCH MG: 0.5 INHALANT RESPIRATORY (INHALATION) at 07:27

## 2018-11-19 RX ADMIN — IPRATROPIUM BROMIDE AND ALBUTEROL SULFATE PRN ML: .5; 3 SOLUTION RESPIRATORY (INHALATION) at 03:35

## 2018-11-19 RX ADMIN — DOCUSATE SODIUM SCH MG: 100 CAPSULE, LIQUID FILLED ORAL at 19:49

## 2018-11-19 RX ADMIN — PANTOPRAZOLE SODIUM SCH MG: 40 TABLET, DELAYED RELEASE ORAL at 19:50

## 2018-11-19 RX ADMIN — DOCUSATE SODIUM SCH MG: 100 CAPSULE, LIQUID FILLED ORAL at 07:49

## 2018-11-19 RX ADMIN — HYDROCODONE BITARTRATE AND ACETAMINOPHEN PRN TAB: 10; 325 TABLET ORAL at 07:49

## 2018-11-19 RX ADMIN — HYDROCODONE BITARTRATE AND ACETAMINOPHEN PRN TAB: 10; 325 TABLET ORAL at 13:21

## 2018-11-19 RX ADMIN — ONDANSETRON PRN MG: 2 INJECTION, SOLUTION INTRAMUSCULAR; INTRAVENOUS at 19:49

## 2018-11-19 RX ADMIN — ALBUTEROL SULFATE SCH MG: 2.5 SOLUTION RESPIRATORY (INHALATION) at 07:27

## 2018-11-19 RX ADMIN — METOCLOPRAMIDE PRN MG: 5 INJECTION, SOLUTION INTRAMUSCULAR; INTRAVENOUS at 13:21

## 2018-11-19 RX ADMIN — ONDANSETRON PRN MG: 2 INJECTION, SOLUTION INTRAMUSCULAR; INTRAVENOUS at 11:00

## 2018-11-19 RX ADMIN — BUDESONIDE SCH MG: 0.5 INHALANT RESPIRATORY (INHALATION) at 19:35

## 2018-11-19 RX ADMIN — CARVEDILOL SCH MG: 6.25 TABLET, FILM COATED ORAL at 07:58

## 2018-11-19 RX ADMIN — IPRATROPIUM BROMIDE AND ALBUTEROL SULFATE PRN ML: .5; 3 SOLUTION RESPIRATORY (INHALATION) at 23:18

## 2018-11-19 RX ADMIN — HYDROCODONE BITARTRATE AND ACETAMINOPHEN PRN TAB: 10; 325 TABLET ORAL at 19:49

## 2018-11-19 RX ADMIN — HYDROCODONE BITARTRATE AND ACETAMINOPHEN PRN TAB: 10; 325 TABLET ORAL at 01:13

## 2018-11-19 RX ADMIN — ALBUTEROL SULFATE SCH MG: 2.5 SOLUTION RESPIRATORY (INHALATION) at 11:00

## 2018-11-20 VITALS — SYSTOLIC BLOOD PRESSURE: 97 MMHG | DIASTOLIC BLOOD PRESSURE: 63 MMHG

## 2018-11-20 VITALS — DIASTOLIC BLOOD PRESSURE: 69 MMHG | SYSTOLIC BLOOD PRESSURE: 128 MMHG

## 2018-11-20 VITALS — SYSTOLIC BLOOD PRESSURE: 117 MMHG | DIASTOLIC BLOOD PRESSURE: 60 MMHG

## 2018-11-20 VITALS — DIASTOLIC BLOOD PRESSURE: 63 MMHG | SYSTOLIC BLOOD PRESSURE: 114 MMHG

## 2018-11-20 VITALS — SYSTOLIC BLOOD PRESSURE: 103 MMHG | DIASTOLIC BLOOD PRESSURE: 60 MMHG

## 2018-11-20 VITALS — SYSTOLIC BLOOD PRESSURE: 118 MMHG | DIASTOLIC BLOOD PRESSURE: 71 MMHG

## 2018-11-20 VITALS — SYSTOLIC BLOOD PRESSURE: 107 MMHG | DIASTOLIC BLOOD PRESSURE: 56 MMHG

## 2018-11-20 VITALS — DIASTOLIC BLOOD PRESSURE: 75 MMHG | SYSTOLIC BLOOD PRESSURE: 109 MMHG

## 2018-11-20 VITALS — DIASTOLIC BLOOD PRESSURE: 59 MMHG | SYSTOLIC BLOOD PRESSURE: 98 MMHG

## 2018-11-20 VITALS — SYSTOLIC BLOOD PRESSURE: 96 MMHG | DIASTOLIC BLOOD PRESSURE: 60 MMHG

## 2018-11-20 VITALS — SYSTOLIC BLOOD PRESSURE: 116 MMHG | DIASTOLIC BLOOD PRESSURE: 62 MMHG

## 2018-11-20 VITALS — SYSTOLIC BLOOD PRESSURE: 88 MMHG | DIASTOLIC BLOOD PRESSURE: 52 MMHG

## 2018-11-20 VITALS — SYSTOLIC BLOOD PRESSURE: 100 MMHG | DIASTOLIC BLOOD PRESSURE: 67 MMHG

## 2018-11-20 LAB
ALBUMIN SERPL BCP-MCNC: 3 G/DL (ref 3.4–5)
ALBUMIN/GLOB SERPL: 1 {RATIO} (ref 1.1–1.5)
ALP SERPL-CCNC: 86 IU/L (ref 46–116)
ALT SERPL W P-5'-P-CCNC: 20 U/L (ref 12–78)
ANION GAP SERPL CALCULATED.3IONS-SCNC: 4 MMOL/L (ref 8–16)
AST SERPL W P-5'-P-CCNC: 10 U/L (ref 10–37)
BASOPHILS # BLD AUTO: 0 X10'3 (ref 0–0.2)
BASOPHILS NFR BLD AUTO: 0.3 % (ref 0–1)
BILIRUB SERPL-MCNC: 0.6 MG/DL (ref 0.1–1)
BUN SERPL-MCNC: 18 MG/DL (ref 7–18)
BUN/CREAT SERPL: 14.5 (ref 5.4–32)
CALCIUM SERPL-MCNC: 7.9 MG/DL (ref 8.5–10.1)
CHLORIDE SERPL-SCNC: 93 MMOL/L (ref 99–107)
CO2 SERPL-SCNC: 30.8 MMOL/L (ref 24–32)
CREAT SERPL-MCNC: 1.24 MG/DL (ref 0.6–1.1)
EOSINOPHIL # BLD AUTO: 0.3 X10'3 (ref 0–0.9)
EOSINOPHIL NFR BLD AUTO: 4.6 % (ref 0–6)
ERYTHROCYTE [DISTWIDTH] IN BLOOD BY AUTOMATED COUNT: 18 % (ref 11.5–14.5)
GFR SERPL CREATININE-BSD FRML MDRD: 58 ML/MIN
GLUCOSE SERPL-MCNC: 110 MG/DL (ref 70–104)
HCT VFR BLD AUTO: 26.3 % (ref 42–52)
HGB BLD-MCNC: 8.3 G/DL (ref 14–17.9)
LYMPHOCYTES # BLD AUTO: 1.1 X10'3 (ref 1.1–4.8)
LYMPHOCYTES NFR BLD AUTO: 18.6 % (ref 21–51)
MCH RBC QN AUTO: 25.4 PG (ref 27–31)
MCHC RBC AUTO-ENTMCNC: 31.6 % (ref 33–36.5)
MCV RBC AUTO: 80.1 FL (ref 78–98)
MONOCYTES # BLD AUTO: 0.4 X10'3 (ref 0–0.9)
MONOCYTES NFR BLD AUTO: 6.9 % (ref 2–12)
NEUTROPHILS # BLD AUTO: 4.1 X10'3 (ref 1.8–7.7)
NEUTROPHILS NFR BLD AUTO: 69.6 % (ref 42–75)
PLATELET # BLD AUTO: 99 X10'3 (ref 140–440)
PMV BLD AUTO: 8.9 FL (ref 7.4–10.4)
POTASSIUM SERPL-SCNC: 4.8 MMOL/L (ref 3.5–5.1)
PROT SERPL-MCNC: 6 G/DL (ref 6.4–8.2)
RBC # BLD AUTO: 3.28 X10'6 (ref 4.7–6.1)
SODIUM SERPL-SCNC: 128 MMOL/L (ref 135–145)
WBC # BLD AUTO: 5.8 X10'3 (ref 4.5–11)

## 2018-11-20 PROCEDURE — 0DJ08ZZ INSPECTION OF UPPER INTESTINAL TRACT, VIA NATURAL OR ARTIFICIAL OPENING ENDOSCOPIC: ICD-10-PCS | Performed by: SPECIALIST

## 2018-11-20 RX ADMIN — ALBUTEROL SULFATE SCH MG: 2.5 SOLUTION RESPIRATORY (INHALATION) at 07:58

## 2018-11-20 RX ADMIN — ALBUTEROL SULFATE SCH MG: 2.5 SOLUTION RESPIRATORY (INHALATION) at 21:33

## 2018-11-20 RX ADMIN — HYDROCODONE BITARTRATE AND ACETAMINOPHEN PRN TAB: 10; 325 TABLET ORAL at 20:43

## 2018-11-20 RX ADMIN — CARVEDILOL SCH MG: 6.25 TABLET, FILM COATED ORAL at 20:35

## 2018-11-20 RX ADMIN — BUDESONIDE SCH MG: 0.5 INHALANT RESPIRATORY (INHALATION) at 21:33

## 2018-11-20 RX ADMIN — ALBUTEROL SULFATE SCH MG: 2.5 SOLUTION RESPIRATORY (INHALATION) at 11:02

## 2018-11-20 RX ADMIN — DOCUSATE SODIUM SCH MG: 100 CAPSULE, LIQUID FILLED ORAL at 08:11

## 2018-11-20 RX ADMIN — ALBUTEROL SULFATE SCH MG: 2.5 SOLUTION RESPIRATORY (INHALATION) at 15:00

## 2018-11-20 RX ADMIN — PANTOPRAZOLE SODIUM SCH MG: 40 TABLET, DELAYED RELEASE ORAL at 20:36

## 2018-11-20 RX ADMIN — BUDESONIDE SCH MG: 0.5 INHALANT RESPIRATORY (INHALATION) at 07:58

## 2018-11-20 RX ADMIN — ONDANSETRON PRN MG: 2 INJECTION, SOLUTION INTRAMUSCULAR; INTRAVENOUS at 04:21

## 2018-11-20 RX ADMIN — CARVEDILOL SCH MG: 6.25 TABLET, FILM COATED ORAL at 08:12

## 2018-11-20 RX ADMIN — HYDROCODONE BITARTRATE AND ACETAMINOPHEN PRN TAB: 10; 325 TABLET ORAL at 04:22

## 2018-11-20 RX ADMIN — DOCUSATE SODIUM SCH MG: 100 CAPSULE, LIQUID FILLED ORAL at 20:35

## 2018-11-20 RX ADMIN — PANTOPRAZOLE SODIUM SCH MG: 40 TABLET, DELAYED RELEASE ORAL at 08:11

## 2018-11-20 RX ADMIN — DOBUTAMINE IN DEXTROSE SCH MLS/HR: 200 INJECTION, SOLUTION INTRAVENOUS at 13:01

## 2018-11-20 RX ADMIN — HYDROCODONE BITARTRATE AND ACETAMINOPHEN PRN TAB: 10; 325 TABLET ORAL at 12:59

## 2018-11-20 RX ADMIN — HYDROCODONE BITARTRATE AND ACETAMINOPHEN PRN TAB: 10; 325 TABLET ORAL at 08:42

## 2018-11-20 RX ADMIN — DIATRIZOATE MEGLUMINE AND DIATRIZOATE SODIUM SCH ML: 660; 100 LIQUID ORAL; RECTAL at 02:30

## 2018-11-21 VITALS — SYSTOLIC BLOOD PRESSURE: 105 MMHG | DIASTOLIC BLOOD PRESSURE: 55 MMHG

## 2018-11-21 VITALS — SYSTOLIC BLOOD PRESSURE: 102 MMHG | DIASTOLIC BLOOD PRESSURE: 58 MMHG

## 2018-11-21 VITALS — SYSTOLIC BLOOD PRESSURE: 104 MMHG | DIASTOLIC BLOOD PRESSURE: 51 MMHG

## 2018-11-21 VITALS — SYSTOLIC BLOOD PRESSURE: 94 MMHG | DIASTOLIC BLOOD PRESSURE: 51 MMHG

## 2018-11-21 VITALS — SYSTOLIC BLOOD PRESSURE: 92 MMHG | DIASTOLIC BLOOD PRESSURE: 56 MMHG

## 2018-11-21 VITALS — SYSTOLIC BLOOD PRESSURE: 115 MMHG | DIASTOLIC BLOOD PRESSURE: 62 MMHG

## 2018-11-21 VITALS — SYSTOLIC BLOOD PRESSURE: 106 MMHG | DIASTOLIC BLOOD PRESSURE: 60 MMHG

## 2018-11-21 LAB
ALBUMIN SERPL BCP-MCNC: 2.9 G/DL (ref 3.4–5)
ALBUMIN/GLOB SERPL: 1 {RATIO} (ref 1.1–1.5)
ALP SERPL-CCNC: 89 IU/L (ref 46–116)
ALT SERPL W P-5'-P-CCNC: 16 U/L (ref 12–78)
ANION GAP SERPL CALCULATED.3IONS-SCNC: 3 MMOL/L (ref 8–16)
AST SERPL W P-5'-P-CCNC: 10 U/L (ref 10–37)
BASOPHILS # BLD AUTO: 0 X10'3 (ref 0–0.2)
BASOPHILS NFR BLD AUTO: 0.4 % (ref 0–1)
BILIRUB SERPL-MCNC: 0.4 MG/DL (ref 0.1–1)
BUN SERPL-MCNC: 18 MG/DL (ref 7–18)
BUN/CREAT SERPL: 14.6 (ref 5.4–32)
CALCIUM SERPL-MCNC: 8.3 MG/DL (ref 8.5–10.1)
CHLORIDE SERPL-SCNC: 96 MMOL/L (ref 99–107)
CO2 SERPL-SCNC: 31.8 MMOL/L (ref 24–32)
CREAT SERPL-MCNC: 1.23 MG/DL (ref 0.6–1.1)
EOSINOPHIL # BLD AUTO: 0.2 X10'3 (ref 0–0.9)
EOSINOPHIL NFR BLD AUTO: 4.1 % (ref 0–6)
ERYTHROCYTE [DISTWIDTH] IN BLOOD BY AUTOMATED COUNT: 17.7 % (ref 11.5–14.5)
GFR SERPL CREATININE-BSD FRML MDRD: 59 ML/MIN
GLUCOSE SERPL-MCNC: 93 MG/DL (ref 70–104)
HCT VFR BLD AUTO: 25.7 % (ref 42–52)
HGB BLD-MCNC: 8.3 G/DL (ref 14–17.9)
LYMPHOCYTES # BLD AUTO: 1.2 X10'3 (ref 1.1–4.8)
LYMPHOCYTES NFR BLD AUTO: 21.1 % (ref 21–51)
MCH RBC QN AUTO: 25.9 PG (ref 27–31)
MCHC RBC AUTO-ENTMCNC: 32.3 % (ref 33–36.5)
MCV RBC AUTO: 80.2 FL (ref 78–98)
MONOCYTES # BLD AUTO: 0.4 X10'3 (ref 0–0.9)
MONOCYTES NFR BLD AUTO: 7.1 % (ref 2–12)
NEUTROPHILS # BLD AUTO: 3.8 X10'3 (ref 1.8–7.7)
NEUTROPHILS NFR BLD AUTO: 67.3 % (ref 42–75)
PLATELET # BLD AUTO: 103 X10'3 (ref 140–440)
PMV BLD AUTO: 8.7 FL (ref 7.4–10.4)
POTASSIUM SERPL-SCNC: 4.7 MMOL/L (ref 3.5–5.1)
PROT SERPL-MCNC: 5.9 G/DL (ref 6.4–8.2)
RBC # BLD AUTO: 3.21 X10'6 (ref 4.7–6.1)
SODIUM SERPL-SCNC: 131 MMOL/L (ref 135–145)
WBC # BLD AUTO: 5.6 X10'3 (ref 4.5–11)

## 2018-11-21 PROCEDURE — BW201ZZ COMPUTERIZED TOMOGRAPHY (CT SCAN) OF ABDOMEN USING LOW OSMOLAR CONTRAST: ICD-10-PCS

## 2018-11-21 RX ADMIN — DOCUSATE SODIUM SCH MG: 100 CAPSULE, LIQUID FILLED ORAL at 09:16

## 2018-11-21 RX ADMIN — BUDESONIDE SCH MG: 0.5 INHALANT RESPIRATORY (INHALATION) at 09:29

## 2018-11-21 RX ADMIN — ALBUTEROL SULFATE SCH MG: 2.5 SOLUTION RESPIRATORY (INHALATION) at 11:48

## 2018-11-21 RX ADMIN — DIATRIZOATE MEGLUMINE AND DIATRIZOATE SODIUM SCH ML: 660; 100 LIQUID ORAL; RECTAL at 07:10

## 2018-11-21 RX ADMIN — PANTOPRAZOLE SODIUM SCH MG: 40 TABLET, DELAYED RELEASE ORAL at 09:15

## 2018-11-21 RX ADMIN — HYDROCODONE BITARTRATE AND ACETAMINOPHEN PRN TAB: 10; 325 TABLET ORAL at 07:10

## 2018-11-21 RX ADMIN — DIATRIZOATE MEGLUMINE AND DIATRIZOATE SODIUM SCH ML: 660; 100 LIQUID ORAL; RECTAL at 09:14

## 2018-11-21 RX ADMIN — PANTOPRAZOLE SODIUM SCH MG: 40 TABLET, DELAYED RELEASE ORAL at 19:58

## 2018-11-21 RX ADMIN — BUDESONIDE SCH MG: 0.5 INHALANT RESPIRATORY (INHALATION) at 19:36

## 2018-11-21 RX ADMIN — IPRATROPIUM BROMIDE AND ALBUTEROL SULFATE PRN ML: .5; 3 SOLUTION RESPIRATORY (INHALATION) at 23:51

## 2018-11-21 RX ADMIN — HYDROCODONE BITARTRATE AND ACETAMINOPHEN PRN TAB: 10; 325 TABLET ORAL at 19:58

## 2018-11-21 RX ADMIN — CARVEDILOL SCH MG: 6.25 TABLET, FILM COATED ORAL at 09:15

## 2018-11-21 RX ADMIN — DOBUTAMINE IN DEXTROSE SCH MLS/HR: 200 INJECTION, SOLUTION INTRAVENOUS at 18:04

## 2018-11-21 RX ADMIN — DOCUSATE SODIUM SCH MG: 100 CAPSULE, LIQUID FILLED ORAL at 19:59

## 2018-11-21 RX ADMIN — HYDROCODONE BITARTRATE AND ACETAMINOPHEN PRN TAB: 10; 325 TABLET ORAL at 14:24

## 2018-11-21 RX ADMIN — ALBUTEROL SULFATE SCH MG: 2.5 SOLUTION RESPIRATORY (INHALATION) at 19:36

## 2018-11-21 RX ADMIN — ALBUTEROL SULFATE SCH MG: 2.5 SOLUTION RESPIRATORY (INHALATION) at 16:04

## 2018-11-21 RX ADMIN — ALBUTEROL SULFATE SCH MG: 2.5 SOLUTION RESPIRATORY (INHALATION) at 09:29

## 2018-11-21 RX ADMIN — IPRATROPIUM BROMIDE AND ALBUTEROL SULFATE PRN ML: .5; 3 SOLUTION RESPIRATORY (INHALATION) at 00:39

## 2018-11-22 VITALS — DIASTOLIC BLOOD PRESSURE: 59 MMHG | SYSTOLIC BLOOD PRESSURE: 109 MMHG

## 2018-11-22 VITALS — SYSTOLIC BLOOD PRESSURE: 110 MMHG | DIASTOLIC BLOOD PRESSURE: 61 MMHG

## 2018-11-22 VITALS — DIASTOLIC BLOOD PRESSURE: 58 MMHG | SYSTOLIC BLOOD PRESSURE: 109 MMHG

## 2018-11-22 VITALS — DIASTOLIC BLOOD PRESSURE: 60 MMHG | SYSTOLIC BLOOD PRESSURE: 106 MMHG

## 2018-11-22 VITALS — DIASTOLIC BLOOD PRESSURE: 62 MMHG | SYSTOLIC BLOOD PRESSURE: 83 MMHG

## 2018-11-22 VITALS — SYSTOLIC BLOOD PRESSURE: 109 MMHG | DIASTOLIC BLOOD PRESSURE: 59 MMHG

## 2018-11-22 LAB
ALBUMIN SERPL BCP-MCNC: 2.8 G/DL (ref 3.4–5)
ALBUMIN/GLOB SERPL: 1 {RATIO} (ref 1.1–1.5)
ALP SERPL-CCNC: 90 IU/L (ref 46–116)
ALT SERPL W P-5'-P-CCNC: 15 U/L (ref 12–78)
ANION GAP SERPL CALCULATED.3IONS-SCNC: 1 MMOL/L (ref 8–16)
ANISOCYTOSIS BLD QL SMEAR: (no result)
AST SERPL W P-5'-P-CCNC: 10 U/L (ref 10–37)
BASOPHILS # BLD AUTO: (no result) X10'3 (ref 0–0.2)
BASOPHILS # BLD AUTO: 0 X10'3 (ref 0–0.2)
BASOPHILS NFR BLD AUTO: (no result) % (ref 0–1)
BASOPHILS NFR BLD AUTO: 0.2 % (ref 0–1)
BILIRUB SERPL-MCNC: 0.4 MG/DL (ref 0.1–1)
BUN SERPL-MCNC: 18 MG/DL (ref 7–18)
BUN/CREAT SERPL: 14.1 (ref 5.4–32)
CALCIUM SERPL-MCNC: 8.2 MG/DL (ref 8.5–10.1)
CHLORIDE SERPL-SCNC: 97 MMOL/L (ref 99–107)
CO2 SERPL-SCNC: 33.7 MMOL/L (ref 24–32)
CREAT SERPL-MCNC: 1.28 MG/DL (ref 0.6–1.1)
EOSINOPHIL # BLD AUTO: (no result) X10'3 (ref 0–0.9)
EOSINOPHIL # BLD AUTO: 0.2 X10'3 (ref 0–0.9)
EOSINOPHIL NFR BLD AUTO: (no result) % (ref 0–6)
EOSINOPHIL NFR BLD AUTO: 3.7 % (ref 0–6)
ERYTHROCYTE [DISTWIDTH] IN BLOOD BY AUTOMATED COUNT: 18 % (ref 11.5–14.5)
ERYTHROCYTE [DISTWIDTH] IN BLOOD BY AUTOMATED COUNT: 18.1 % (ref 11.5–14.5)
GFR SERPL CREATININE-BSD FRML MDRD: 56 ML/MIN
GLUCOSE SERPL-MCNC: 108 MG/DL (ref 70–104)
HCT VFR BLD AUTO: 24.8 % (ref 42–52)
HCT VFR BLD AUTO: 27.1 % (ref 42–52)
HGB BLD-MCNC: 8 G/DL (ref 14–17.9)
HGB BLD-MCNC: 8.5 G/DL (ref 14–17.9)
LYMPHOCYTES # BLD AUTO: (no result) X10'3 (ref 1.1–4.8)
LYMPHOCYTES # BLD AUTO: 1.2 X10'3 (ref 1.1–4.8)
LYMPHOCYTES NFR BLD AUTO: (no result) % (ref 21–51)
LYMPHOCYTES NFR BLD AUTO: 23.1 % (ref 21–51)
LYMPHOCYTES NFR BLD MANUAL: 9 % (ref 21–51)
MCH RBC QN AUTO: 25.5 PG (ref 27–31)
MCH RBC QN AUTO: 25.9 PG (ref 27–31)
MCHC RBC AUTO-ENTMCNC: 31.4 % (ref 33–36.5)
MCHC RBC AUTO-ENTMCNC: 32.1 % (ref 33–36.5)
MCV RBC AUTO: 80.6 FL (ref 78–98)
MCV RBC AUTO: 81 FL (ref 78–98)
MICROCYTES BLD QL SMEAR: (no result)
MONOCYTES # BLD AUTO: (no result) X10'3 (ref 0–0.9)
MONOCYTES # BLD AUTO: 0.4 X10'3 (ref 0–0.9)
MONOCYTES NFR BLD AUTO: (no result) % (ref 2–12)
MONOCYTES NFR BLD AUTO: 7.5 % (ref 2–12)
MONOCYTES NFR BLD MANUAL: 6 % (ref 2–12)
NEUTROPHILS # BLD AUTO: (no result) X10'3 (ref 1.8–7.7)
NEUTROPHILS # BLD AUTO: 3.4 X10'3 (ref 1.8–7.7)
NEUTROPHILS NFR BLD AUTO: (no result) % (ref 42–75)
NEUTROPHILS NFR BLD AUTO: 65.5 % (ref 42–75)
NEUTS SEG NFR BLD MANUAL: 85 % (ref 42–75)
PLATELET # BLD AUTO: 102 X10'3 (ref 140–440)
PLATELET # BLD AUTO: 116 X10'3 (ref 140–440)
PLATELET BLD QL SMEAR: (no result)
PMV BLD AUTO: 8.2 FL (ref 7.4–10.4)
PMV BLD AUTO: 8.8 FL (ref 7.4–10.4)
POIKILOCYTOSIS BLD QL SMEAR: (no result)
POTASSIUM SERPL-SCNC: 4.7 MMOL/L (ref 3.5–5.1)
PROT SERPL-MCNC: 5.7 G/DL (ref 6.4–8.2)
RBC # BLD AUTO: 3.08 X10'6 (ref 4.7–6.1)
RBC # BLD AUTO: 3.35 X10'6 (ref 4.7–6.1)
RBC MORPH BLD: (no result)
SODIUM SERPL-SCNC: 132 MMOL/L (ref 135–145)
TOTAL CELLS COUNTED FLD: 100
WBC # BLD AUTO: 5.2 X10'3 (ref 4.5–11)
WBC # BLD AUTO: 6.9 X10'3 (ref 4.5–11)

## 2018-11-22 RX ADMIN — ALBUTEROL SULFATE SCH MG: 2.5 SOLUTION RESPIRATORY (INHALATION) at 15:16

## 2018-11-22 RX ADMIN — BUDESONIDE SCH MG: 0.5 INHALANT RESPIRATORY (INHALATION) at 08:23

## 2018-11-22 RX ADMIN — ALBUTEROL SULFATE SCH MG: 2.5 SOLUTION RESPIRATORY (INHALATION) at 11:32

## 2018-11-22 RX ADMIN — HYDROCODONE BITARTRATE AND ACETAMINOPHEN PRN TAB: 10; 325 TABLET ORAL at 11:47

## 2018-11-22 RX ADMIN — HYDROCODONE BITARTRATE AND ACETAMINOPHEN PRN TAB: 10; 325 TABLET ORAL at 02:24

## 2018-11-22 RX ADMIN — PANTOPRAZOLE SODIUM SCH MG: 40 TABLET, DELAYED RELEASE ORAL at 07:35

## 2018-11-22 RX ADMIN — ALBUTEROL SULFATE SCH MG: 2.5 SOLUTION RESPIRATORY (INHALATION) at 08:23

## 2018-11-22 RX ADMIN — DOCUSATE SODIUM SCH MG: 100 CAPSULE, LIQUID FILLED ORAL at 07:35

## 2018-11-22 RX ADMIN — HYDROCODONE BITARTRATE AND ACETAMINOPHEN PRN TAB: 10; 325 TABLET ORAL at 07:36

## 2018-11-22 RX ADMIN — ONDANSETRON PRN MG: 2 INJECTION, SOLUTION INTRAMUSCULAR; INTRAVENOUS at 07:43

## 2018-11-28 ENCOUNTER — HOSPITAL ENCOUNTER (INPATIENT)
Dept: HOSPITAL 94 - ER | Age: 68
LOS: 6 days | Discharge: HOME | DRG: 291 | End: 2018-12-04
Attending: HOSPITALIST | Admitting: INTERNAL MEDICINE
Payer: MEDICARE

## 2018-11-28 VITALS — WEIGHT: 240.3 LBS | HEIGHT: 74 IN | BODY MASS INDEX: 30.84 KG/M2

## 2018-11-28 DIAGNOSIS — K21.9: ICD-10-CM

## 2018-11-28 DIAGNOSIS — F43.10: ICD-10-CM

## 2018-11-28 DIAGNOSIS — J96.10: ICD-10-CM

## 2018-11-28 DIAGNOSIS — K57.90: ICD-10-CM

## 2018-11-28 DIAGNOSIS — I86.4: ICD-10-CM

## 2018-11-28 DIAGNOSIS — T50.2X5A: ICD-10-CM

## 2018-11-28 DIAGNOSIS — I50.23: ICD-10-CM

## 2018-11-28 DIAGNOSIS — E87.3: ICD-10-CM

## 2018-11-28 DIAGNOSIS — N17.9: ICD-10-CM

## 2018-11-28 DIAGNOSIS — I42.9: ICD-10-CM

## 2018-11-28 DIAGNOSIS — Z79.01: ICD-10-CM

## 2018-11-28 DIAGNOSIS — I25.10: ICD-10-CM

## 2018-11-28 DIAGNOSIS — Z99.81: ICD-10-CM

## 2018-11-28 DIAGNOSIS — Z79.899: ICD-10-CM

## 2018-11-28 DIAGNOSIS — D64.9: ICD-10-CM

## 2018-11-28 DIAGNOSIS — J44.1: ICD-10-CM

## 2018-11-28 DIAGNOSIS — R91.8: ICD-10-CM

## 2018-11-28 DIAGNOSIS — C91.11: ICD-10-CM
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
## 2018-11-28 DIAGNOSIS — N18.3: ICD-10-CM

## 2018-11-28 DIAGNOSIS — M19.90: ICD-10-CM

## 2018-11-28 DIAGNOSIS — R16.1: ICD-10-CM

## 2018-11-28 DIAGNOSIS — I13.0: Primary | ICD-10-CM

## 2018-11-28 DIAGNOSIS — Z87.891: ICD-10-CM

## 2018-11-28 DIAGNOSIS — E87.1: ICD-10-CM

## 2018-11-28 DIAGNOSIS — J98.11: ICD-10-CM

## 2018-11-28 DIAGNOSIS — J44.0: ICD-10-CM

## 2018-11-28 DIAGNOSIS — R59.1: ICD-10-CM

## 2018-11-28 DIAGNOSIS — J20.9: ICD-10-CM

## 2018-11-28 DIAGNOSIS — I48.0: ICD-10-CM

## 2018-11-28 LAB
ALBUMIN SERPL BCP-MCNC: 3.2 G/DL (ref 3.4–5)
ALBUMIN/GLOB SERPL: 1 {RATIO} (ref 1.1–1.5)
ALP SERPL-CCNC: 99 IU/L (ref 46–116)
ALT SERPL W P-5'-P-CCNC: 21 U/L (ref 12–78)
ANION GAP SERPL CALCULATED.3IONS-SCNC: 4 MMOL/L (ref 8–16)
APTT PPP: 30 SECONDS (ref 22–32)
AST SERPL W P-5'-P-CCNC: 15 U/L (ref 10–37)
BASOPHILS # BLD AUTO: 0.1 X10'3 (ref 0–0.2)
BASOPHILS NFR BLD AUTO: 1.5 % (ref 0–1)
BILIRUB SERPL-MCNC: 0.6 MG/DL (ref 0.1–1)
BUN SERPL-MCNC: 25 MG/DL (ref 7–18)
BUN/CREAT SERPL: 15.5 (ref 5.4–32)
CALCIUM SERPL-MCNC: 8.4 MG/DL (ref 8.5–10.1)
CHLORIDE SERPL-SCNC: 94 MMOL/L (ref 99–107)
CO2 SERPL-SCNC: 32.6 MMOL/L (ref 24–32)
CREAT SERPL-MCNC: 1.61 MG/DL (ref 0.6–1.1)
EOSINOPHIL # BLD AUTO: 0.2 X10'3 (ref 0–0.9)
EOSINOPHIL NFR BLD AUTO: 2 % (ref 0–6)
ERYTHROCYTE [DISTWIDTH] IN BLOOD BY AUTOMATED COUNT: 18.4 % (ref 11.5–14.5)
GFR SERPL CREATININE-BSD FRML MDRD: 43 ML/MIN
GLUCOSE SERPL-MCNC: 107 MG/DL (ref 70–104)
HCT VFR BLD AUTO: 27.6 % (ref 42–52)
HGB BLD-MCNC: 8.6 G/DL (ref 14–17.9)
INR PPP: 1.3 INR
LYMPHOCYTES # BLD AUTO: 2.9 X10'3 (ref 1.1–4.8)
LYMPHOCYTES NFR BLD AUTO: 31.5 % (ref 21–51)
MCH RBC QN AUTO: 24.9 PG (ref 27–31)
MCHC RBC AUTO-ENTMCNC: 31.2 % (ref 33–36.5)
MCV RBC AUTO: 79.9 FL (ref 78–98)
MONOCYTES # BLD AUTO: 0.5 X10'3 (ref 0–0.9)
MONOCYTES NFR BLD AUTO: 5.9 % (ref 2–12)
NEUTROPHILS # BLD AUTO: 5.4 X10'3 (ref 1.8–7.7)
NEUTROPHILS NFR BLD AUTO: 59.1 % (ref 42–75)
PLATELET # BLD AUTO: 132 X10'3 (ref 140–440)
PMV BLD AUTO: 9.6 FL (ref 7.4–10.4)
POTASSIUM SERPL-SCNC: 4.6 MMOL/L (ref 3.5–5.1)
PROT SERPL-MCNC: 6.3 G/DL (ref 6.4–8.2)
PROTHROMBIN TIME: 12.6 SECONDS (ref 9–12)
RBC # BLD AUTO: 3.45 X10'6 (ref 4.7–6.1)
SODIUM SERPL-SCNC: 131 MMOL/L (ref 135–145)
WBC # BLD AUTO: 9.1 X10'3 (ref 4.5–11)

## 2018-11-28 PROCEDURE — 96374 THER/PROPH/DIAG INJ IV PUSH: CPT

## 2018-11-28 PROCEDURE — 84443 ASSAY THYROID STIM HORMONE: CPT

## 2018-11-28 PROCEDURE — 99285 EMERGENCY DEPT VISIT HI MDM: CPT

## 2018-11-28 PROCEDURE — 97116 GAIT TRAINING THERAPY: CPT

## 2018-11-28 PROCEDURE — 80048 BASIC METABOLIC PNL TOTAL CA: CPT

## 2018-11-28 PROCEDURE — 80053 COMPREHEN METABOLIC PANEL: CPT

## 2018-11-28 PROCEDURE — 84484 ASSAY OF TROPONIN QUANT: CPT

## 2018-11-28 PROCEDURE — 85730 THROMBOPLASTIN TIME PARTIAL: CPT

## 2018-11-28 PROCEDURE — 87077 CULTURE AEROBIC IDENTIFY: CPT

## 2018-11-28 PROCEDURE — 83880 ASSAY OF NATRIURETIC PEPTIDE: CPT

## 2018-11-28 PROCEDURE — 85025 COMPLETE CBC W/AUTO DIFF WBC: CPT

## 2018-11-28 PROCEDURE — 85018 HEMOGLOBIN: CPT

## 2018-11-28 PROCEDURE — 36415 COLL VENOUS BLD VENIPUNCTURE: CPT

## 2018-11-28 PROCEDURE — 97530 THERAPEUTIC ACTIVITIES: CPT

## 2018-11-28 PROCEDURE — 80069 RENAL FUNCTION PANEL: CPT

## 2018-11-28 PROCEDURE — 84100 ASSAY OF PHOSPHORUS: CPT

## 2018-11-28 PROCEDURE — 83735 ASSAY OF MAGNESIUM: CPT

## 2018-11-28 PROCEDURE — 71045 X-RAY EXAM CHEST 1 VIEW: CPT

## 2018-11-28 PROCEDURE — 87070 CULTURE OTHR SPECIMN AEROBIC: CPT

## 2018-11-28 PROCEDURE — 97161 PT EVAL LOW COMPLEX 20 MIN: CPT

## 2018-11-28 PROCEDURE — 82803 BLOOD GASES ANY COMBINATION: CPT

## 2018-11-28 PROCEDURE — 84439 ASSAY OF FREE THYROXINE: CPT

## 2018-11-28 PROCEDURE — 94760 N-INVAS EAR/PLS OXIMETRY 1: CPT

## 2018-11-28 PROCEDURE — 85610 PROTHROMBIN TIME: CPT

## 2018-11-28 PROCEDURE — 36600 WITHDRAWAL OF ARTERIAL BLOOD: CPT

## 2018-11-28 PROCEDURE — 87186 SC STD MICRODIL/AGAR DIL: CPT

## 2018-11-28 PROCEDURE — 93005 ELECTROCARDIOGRAM TRACING: CPT

## 2018-11-28 PROCEDURE — 94640 AIRWAY INHALATION TREATMENT: CPT

## 2018-11-29 VITALS — DIASTOLIC BLOOD PRESSURE: 73 MMHG | SYSTOLIC BLOOD PRESSURE: 105 MMHG

## 2018-11-29 VITALS — SYSTOLIC BLOOD PRESSURE: 102 MMHG | DIASTOLIC BLOOD PRESSURE: 69 MMHG

## 2018-11-29 VITALS — SYSTOLIC BLOOD PRESSURE: 126 MMHG | DIASTOLIC BLOOD PRESSURE: 76 MMHG

## 2018-11-29 VITALS — DIASTOLIC BLOOD PRESSURE: 66 MMHG | SYSTOLIC BLOOD PRESSURE: 107 MMHG

## 2018-11-29 VITALS — SYSTOLIC BLOOD PRESSURE: 108 MMHG | DIASTOLIC BLOOD PRESSURE: 76 MMHG

## 2018-11-29 VITALS — SYSTOLIC BLOOD PRESSURE: 109 MMHG | DIASTOLIC BLOOD PRESSURE: 75 MMHG

## 2018-11-29 RX ADMIN — Medication SCH MG: at 16:46

## 2018-11-29 RX ADMIN — FUROSEMIDE SCH MG: 10 INJECTION, SOLUTION INTRAMUSCULAR; INTRAVENOUS at 07:53

## 2018-11-29 RX ADMIN — IPRATROPIUM BROMIDE AND ALBUTEROL SULFATE PRN ML: .5; 3 SOLUTION RESPIRATORY (INHALATION) at 01:57

## 2018-11-29 RX ADMIN — BUDESONIDE SCH MG: 0.5 INHALANT RESPIRATORY (INHALATION) at 20:47

## 2018-11-29 RX ADMIN — HYDROCODONE BITARTRATE AND ACETAMINOPHEN PRN TAB: 10; 325 TABLET ORAL at 10:38

## 2018-11-29 RX ADMIN — Medication SCH MMU: at 21:25

## 2018-11-29 RX ADMIN — HYDROCODONE BITARTRATE AND ACETAMINOPHEN PRN TAB: 10; 325 TABLET ORAL at 23:06

## 2018-11-29 RX ADMIN — IPRATROPIUM BROMIDE AND ALBUTEROL SULFATE PRN ML: .5; 3 SOLUTION RESPIRATORY (INHALATION) at 07:11

## 2018-11-29 RX ADMIN — ONDANSETRON PRN MG: 2 INJECTION, SOLUTION INTRAMUSCULAR; INTRAVENOUS at 18:02

## 2018-11-29 RX ADMIN — IPRATROPIUM BROMIDE AND ALBUTEROL SULFATE PRN ML: .5; 3 SOLUTION RESPIRATORY (INHALATION) at 15:03

## 2018-11-29 RX ADMIN — IPRATROPIUM BROMIDE AND ALBUTEROL SULFATE SCH ML: .5; 3 SOLUTION RESPIRATORY (INHALATION) at 20:48

## 2018-11-29 RX ADMIN — FUROSEMIDE SCH MG: 10 INJECTION, SOLUTION INTRAMUSCULAR; INTRAVENOUS at 21:25

## 2018-11-29 RX ADMIN — IPRATROPIUM BROMIDE AND ALBUTEROL SULFATE PRN ML: .5; 3 SOLUTION RESPIRATORY (INHALATION) at 11:48

## 2018-11-29 RX ADMIN — HYDROCODONE BITARTRATE AND ACETAMINOPHEN PRN TAB: 10; 325 TABLET ORAL at 16:58

## 2018-11-29 RX ADMIN — PANTOPRAZOLE SODIUM SCH MG: 40 TABLET, DELAYED RELEASE ORAL at 21:25

## 2018-11-30 VITALS — DIASTOLIC BLOOD PRESSURE: 68 MMHG | SYSTOLIC BLOOD PRESSURE: 106 MMHG

## 2018-11-30 VITALS — DIASTOLIC BLOOD PRESSURE: 70 MMHG | SYSTOLIC BLOOD PRESSURE: 99 MMHG

## 2018-11-30 VITALS — DIASTOLIC BLOOD PRESSURE: 69 MMHG | SYSTOLIC BLOOD PRESSURE: 118 MMHG

## 2018-11-30 VITALS — DIASTOLIC BLOOD PRESSURE: 69 MMHG | SYSTOLIC BLOOD PRESSURE: 108 MMHG

## 2018-11-30 VITALS — SYSTOLIC BLOOD PRESSURE: 102 MMHG | DIASTOLIC BLOOD PRESSURE: 60 MMHG

## 2018-11-30 VITALS — DIASTOLIC BLOOD PRESSURE: 77 MMHG | SYSTOLIC BLOOD PRESSURE: 120 MMHG

## 2018-11-30 LAB
ALBUMIN SERPL BCP-MCNC: 3.4 G/DL (ref 3.4–5)
ANION GAP SERPL CALCULATED.3IONS-SCNC: 5 MMOL/L (ref 8–16)
ANISOCYTOSIS BLD QL SMEAR: (no result)
BASOPHILS # BLD AUTO: 0 X10'3 (ref 0–0.2)
BASOPHILS NFR BLD AUTO: 0.3 % (ref 0–1)
BUN SERPL-MCNC: 28 MG/DL (ref 7–18)
BUN/CREAT SERPL: 18.8 (ref 5.4–32)
CALCIUM SERPL-MCNC: 8.5 MG/DL (ref 8.5–10.1)
CHLORIDE SERPL-SCNC: 93 MMOL/L (ref 99–107)
CO2 SERPL-SCNC: 31.3 MMOL/L (ref 24–32)
CREAT SERPL-MCNC: 1.49 MG/DL (ref 0.6–1.1)
EOSINOPHIL # BLD AUTO: 0.1 X10'3 (ref 0–0.9)
EOSINOPHIL NFR BLD AUTO: 1.1 % (ref 0–6)
ERYTHROCYTE [DISTWIDTH] IN BLOOD BY AUTOMATED COUNT: 18 % (ref 11.5–14.5)
GFR SERPL CREATININE-BSD FRML MDRD: 47 ML/MIN
GLUCOSE SERPL-MCNC: 173 MG/DL (ref 70–104)
HCT VFR BLD AUTO: 28.5 % (ref 42–52)
HGB BLD-MCNC: 8.9 G/DL (ref 14–17.9)
LYMPHOCYTES # BLD AUTO: 6.8 X10'3 (ref 1.1–4.8)
LYMPHOCYTES NFR BLD AUTO: 54.7 % (ref 21–51)
LYMPHOCYTES NFR BLD MANUAL: 66 % (ref 21–51)
MCH RBC QN AUTO: 25 PG (ref 27–31)
MCHC RBC AUTO-ENTMCNC: 31.2 % (ref 33–36.5)
MCV RBC AUTO: 80.2 FL (ref 78–98)
MONOCYTES # BLD AUTO: 0.1 X10'3 (ref 0–0.9)
MONOCYTES NFR BLD AUTO: 0.5 % (ref 2–12)
NEUTROPHILS # BLD AUTO: 5.4 X10'3 (ref 1.8–7.7)
NEUTROPHILS NFR BLD AUTO: 43.4 % (ref 42–75)
NEUTS SEG NFR BLD MANUAL: 34 % (ref 42–75)
OVALOCYTES BLD QL SMEAR: (no result)
PLATELET # BLD AUTO: 124 X10'3 (ref 140–440)
PLATELET BLD QL SMEAR: (no result)
PMV BLD AUTO: 9.3 FL (ref 7.4–10.4)
POTASSIUM SERPL-SCNC: 4.4 MMOL/L (ref 3.5–5.1)
RBC # BLD AUTO: 3.55 X10'6 (ref 4.7–6.1)
RBC MORPH BLD: (no result)
SMUDGE CELLS BLD QL SMEAR: (no result)
SODIUM SERPL-SCNC: 129 MMOL/L (ref 135–145)
TOTAL CELLS COUNTED FLD: 100
WBC # BLD AUTO: 12.4 X10'3 (ref 4.5–11)

## 2018-11-30 RX ADMIN — Medication SCH MMU: at 20:54

## 2018-11-30 RX ADMIN — Medication SCH MMU: at 07:21

## 2018-11-30 RX ADMIN — FUROSEMIDE SCH MG: 10 INJECTION, SOLUTION INTRAMUSCULAR; INTRAVENOUS at 20:56

## 2018-11-30 RX ADMIN — HYDROCODONE BITARTRATE AND ACETAMINOPHEN PRN TAB: 10; 325 TABLET ORAL at 17:26

## 2018-11-30 RX ADMIN — BUDESONIDE SCH MG: 0.5 INHALANT RESPIRATORY (INHALATION) at 19:52

## 2018-11-30 RX ADMIN — BUDESONIDE SCH MG: 0.5 INHALANT RESPIRATORY (INHALATION) at 10:56

## 2018-11-30 RX ADMIN — IPRATROPIUM BROMIDE AND ALBUTEROL SULFATE SCH ML: .5; 3 SOLUTION RESPIRATORY (INHALATION) at 23:24

## 2018-11-30 RX ADMIN — PANTOPRAZOLE SODIUM SCH MG: 40 TABLET, DELAYED RELEASE ORAL at 20:54

## 2018-11-30 RX ADMIN — ONDANSETRON PRN MG: 2 INJECTION, SOLUTION INTRAMUSCULAR; INTRAVENOUS at 17:30

## 2018-11-30 RX ADMIN — FUROSEMIDE SCH MG: 10 INJECTION, SOLUTION INTRAMUSCULAR; INTRAVENOUS at 07:32

## 2018-11-30 RX ADMIN — IPRATROPIUM BROMIDE AND ALBUTEROL SULFATE SCH ML: .5; 3 SOLUTION RESPIRATORY (INHALATION) at 19:52

## 2018-11-30 RX ADMIN — HYDROCODONE BITARTRATE AND ACETAMINOPHEN PRN TAB: 10; 325 TABLET ORAL at 07:18

## 2018-11-30 RX ADMIN — IPRATROPIUM BROMIDE AND ALBUTEROL SULFATE SCH ML: .5; 3 SOLUTION RESPIRATORY (INHALATION) at 10:56

## 2018-11-30 RX ADMIN — Medication SCH MG: at 17:26

## 2018-11-30 RX ADMIN — HYDROCODONE BITARTRATE AND ACETAMINOPHEN PRN TAB: 10; 325 TABLET ORAL at 12:26

## 2018-11-30 RX ADMIN — HYDROCODONE BITARTRATE AND ACETAMINOPHEN PRN TAB: 10; 325 TABLET ORAL at 23:05

## 2018-11-30 RX ADMIN — IPRATROPIUM BROMIDE AND ALBUTEROL SULFATE SCH ML: .5; 3 SOLUTION RESPIRATORY (INHALATION) at 15:19

## 2018-11-30 RX ADMIN — Medication SCH MG: at 07:55

## 2018-11-30 RX ADMIN — PANTOPRAZOLE SODIUM SCH MG: 40 TABLET, DELAYED RELEASE ORAL at 07:20

## 2018-11-30 RX ADMIN — IPRATROPIUM BROMIDE AND ALBUTEROL SULFATE SCH ML: .5; 3 SOLUTION RESPIRATORY (INHALATION) at 04:10

## 2018-11-30 RX ADMIN — IPRATROPIUM BROMIDE AND ALBUTEROL SULFATE SCH ML: .5; 3 SOLUTION RESPIRATORY (INHALATION) at 00:04

## 2018-12-01 VITALS — SYSTOLIC BLOOD PRESSURE: 102 MMHG | DIASTOLIC BLOOD PRESSURE: 66 MMHG

## 2018-12-01 VITALS — DIASTOLIC BLOOD PRESSURE: 76 MMHG | SYSTOLIC BLOOD PRESSURE: 112 MMHG

## 2018-12-01 VITALS — DIASTOLIC BLOOD PRESSURE: 76 MMHG | SYSTOLIC BLOOD PRESSURE: 111 MMHG

## 2018-12-01 VITALS — DIASTOLIC BLOOD PRESSURE: 65 MMHG | SYSTOLIC BLOOD PRESSURE: 111 MMHG

## 2018-12-01 VITALS — SYSTOLIC BLOOD PRESSURE: 100 MMHG | DIASTOLIC BLOOD PRESSURE: 62 MMHG

## 2018-12-01 VITALS — DIASTOLIC BLOOD PRESSURE: 61 MMHG | SYSTOLIC BLOOD PRESSURE: 106 MMHG

## 2018-12-01 VITALS — SYSTOLIC BLOOD PRESSURE: 93 MMHG | DIASTOLIC BLOOD PRESSURE: 67 MMHG

## 2018-12-01 VITALS — SYSTOLIC BLOOD PRESSURE: 99 MMHG | DIASTOLIC BLOOD PRESSURE: 67 MMHG

## 2018-12-01 VITALS — SYSTOLIC BLOOD PRESSURE: 95 MMHG | DIASTOLIC BLOOD PRESSURE: 48 MMHG

## 2018-12-01 VITALS — DIASTOLIC BLOOD PRESSURE: 68 MMHG | SYSTOLIC BLOOD PRESSURE: 105 MMHG

## 2018-12-01 VITALS — DIASTOLIC BLOOD PRESSURE: 64 MMHG | SYSTOLIC BLOOD PRESSURE: 102 MMHG

## 2018-12-01 VITALS — SYSTOLIC BLOOD PRESSURE: 155 MMHG | DIASTOLIC BLOOD PRESSURE: 99 MMHG

## 2018-12-01 VITALS — SYSTOLIC BLOOD PRESSURE: 111 MMHG | DIASTOLIC BLOOD PRESSURE: 74 MMHG

## 2018-12-01 LAB
ALBUMIN SERPL BCP-MCNC: 3.5 G/DL (ref 3.4–5)
ALBUMIN SERPL BCP-MCNC: 3.6 G/DL (ref 3.4–5)
ALBUMIN SERPL BCP-MCNC: 3.6 G/DL (ref 3.4–5)
ANION GAP SERPL CALCULATED.3IONS-SCNC: 4 MMOL/L (ref 8–16)
ANION GAP SERPL CALCULATED.3IONS-SCNC: 4 MMOL/L (ref 8–16)
ANION GAP SERPL CALCULATED.3IONS-SCNC: 6 MMOL/L (ref 8–16)
ANISOCYTOSIS BLD QL SMEAR: (no result)
BASOPHILS # BLD AUTO: 0.2 X10'3 (ref 0–0.2)
BASOPHILS NFR BLD AUTO: 0.7 % (ref 0–1)
BUN SERPL-MCNC: 34 MG/DL (ref 7–18)
BUN SERPL-MCNC: 36 MG/DL (ref 7–18)
BUN SERPL-MCNC: 38 MG/DL (ref 7–18)
BUN/CREAT SERPL: 19.2 (ref 5.4–32)
BUN/CREAT SERPL: 21.2 (ref 5.4–32)
BUN/CREAT SERPL: 23.5 (ref 5.4–32)
CALCIUM SERPL-MCNC: 8.5 MG/DL (ref 8.5–10.1)
CALCIUM SERPL-MCNC: 8.6 MG/DL (ref 8.5–10.1)
CALCIUM SERPL-MCNC: 8.8 MG/DL (ref 8.5–10.1)
CHLORIDE SERPL-SCNC: 93 MMOL/L (ref 99–107)
CO2 SERPL-SCNC: 34.1 MMOL/L (ref 24–32)
CO2 SERPL-SCNC: 35 MMOL/L (ref 24–32)
CO2 SERPL-SCNC: 35.5 MMOL/L (ref 24–32)
CREAT SERPL-MCNC: 1.62 MG/DL (ref 0.6–1.1)
CREAT SERPL-MCNC: 1.7 MG/DL (ref 0.6–1.1)
CREAT SERPL-MCNC: 1.77 MG/DL (ref 0.6–1.1)
EOSINOPHIL # BLD AUTO: 0.2 X10'3 (ref 0–0.9)
EOSINOPHIL NFR BLD AUTO: 0.8 % (ref 0–6)
ERYTHROCYTE [DISTWIDTH] IN BLOOD BY AUTOMATED COUNT: 18.6 % (ref 11.5–14.5)
GFR SERPL CREATININE-BSD FRML MDRD: 39 ML/MIN
GFR SERPL CREATININE-BSD FRML MDRD: 40 ML/MIN
GFR SERPL CREATININE-BSD FRML MDRD: 43 ML/MIN
GLUCOSE SERPL-MCNC: 126 MG/DL (ref 70–104)
GLUCOSE SERPL-MCNC: 153 MG/DL (ref 70–104)
GLUCOSE SERPL-MCNC: 154 MG/DL (ref 70–104)
HCT VFR BLD AUTO: 29.1 % (ref 42–52)
HGB BLD-MCNC: 9 G/DL (ref 14–17.9)
LYMPHOCYTES # BLD AUTO: 12.4 X10'3 (ref 1.1–4.8)
LYMPHOCYTES NFR BLD AUTO: 49.7 % (ref 21–51)
LYMPHOCYTES NFR BLD MANUAL: 42 % (ref 21–51)
MAGNESIUM SERPL-MCNC: 1.5 MG/DL (ref 1.5–2.4)
MCH RBC QN AUTO: 24.9 PG (ref 27–31)
MCHC RBC AUTO-ENTMCNC: 30.8 % (ref 33–36.5)
MCV RBC AUTO: 80.8 FL (ref 78–98)
MONOCYTES # BLD AUTO: 0.4 X10'3 (ref 0–0.9)
MONOCYTES NFR BLD AUTO: 1.5 % (ref 2–12)
MONOCYTES NFR BLD MANUAL: 3 % (ref 2–12)
NEUTROPHILS # BLD AUTO: 11.8 X10'3 (ref 1.8–7.7)
NEUTROPHILS NFR BLD AUTO: 47.3 % (ref 42–75)
NEUTS SEG NFR BLD MANUAL: 52 % (ref 42–75)
OVALOCYTES BLD QL SMEAR: (no result)
PHOSPHATE SERPL-MCNC: 3.6 MG/DL (ref 2.3–4.5)
PHOSPHATE SERPL-MCNC: 4 MG/DL (ref 2.3–4.5)
PLATELET # BLD AUTO: 176 X10'3 (ref 140–440)
PLATELET BLD QL SMEAR: NORMAL
PMV BLD AUTO: 9.1 FL (ref 7.4–10.4)
POLYCHROMASIA BLD QL SMEAR: (no result)
POTASSIUM SERPL-SCNC: 4.4 MMOL/L (ref 3.5–5.1)
POTASSIUM SERPL-SCNC: 4.6 MMOL/L (ref 3.5–5.1)
POTASSIUM SERPL-SCNC: 5 MMOL/L (ref 3.5–5.1)
RBC # BLD AUTO: 3.6 X10'6 (ref 4.7–6.1)
RBC MORPH BLD: (no result)
SMUDGE CELLS BLD QL SMEAR: (no result)
SODIUM SERPL-SCNC: 131 MMOL/L (ref 135–145)
SODIUM SERPL-SCNC: 132 MMOL/L (ref 135–145)
SODIUM SERPL-SCNC: 134 MMOL/L (ref 135–145)
TOTAL CELLS COUNTED FLD: 100
VARIANT LYMPHS NFR BLD MANUAL: 3 % (ref 0–0)
WBC # BLD AUTO: 25 X10'3 (ref 4.5–11)

## 2018-12-01 RX ADMIN — HYDROCODONE BITARTRATE AND ACETAMINOPHEN PRN TAB: 10; 325 TABLET ORAL at 09:59

## 2018-12-01 RX ADMIN — FUROSEMIDE SCH MG: 10 INJECTION, SOLUTION INTRAMUSCULAR; INTRAVENOUS at 08:17

## 2018-12-01 RX ADMIN — IPRATROPIUM BROMIDE AND ALBUTEROL SULFATE SCH ML: .5; 3 SOLUTION RESPIRATORY (INHALATION) at 19:21

## 2018-12-01 RX ADMIN — PANTOPRAZOLE SODIUM SCH MG: 40 TABLET, DELAYED RELEASE ORAL at 08:17

## 2018-12-01 RX ADMIN — Medication SCH MMU: at 20:29

## 2018-12-01 RX ADMIN — IPRATROPIUM BROMIDE AND ALBUTEROL SULFATE SCH ML: .5; 3 SOLUTION RESPIRATORY (INHALATION) at 03:23

## 2018-12-01 RX ADMIN — HYDROCODONE BITARTRATE AND ACETAMINOPHEN PRN TAB: 10; 325 TABLET ORAL at 03:22

## 2018-12-01 RX ADMIN — IPRATROPIUM BROMIDE AND ALBUTEROL SULFATE SCH ML: .5; 3 SOLUTION RESPIRATORY (INHALATION) at 11:22

## 2018-12-01 RX ADMIN — HYDROCODONE BITARTRATE AND ACETAMINOPHEN PRN TAB: 10; 325 TABLET ORAL at 17:55

## 2018-12-01 RX ADMIN — BUDESONIDE SCH MG: 0.5 INHALANT RESPIRATORY (INHALATION) at 07:18

## 2018-12-01 RX ADMIN — PANTOPRAZOLE SODIUM SCH MG: 40 TABLET, DELAYED RELEASE ORAL at 20:29

## 2018-12-01 RX ADMIN — FUROSEMIDE SCH MLS/HR: 10 INJECTION, SOLUTION INTRAMUSCULAR; INTRAVENOUS at 23:37

## 2018-12-01 RX ADMIN — BUDESONIDE SCH MG: 0.5 INHALANT RESPIRATORY (INHALATION) at 19:21

## 2018-12-01 RX ADMIN — Medication SCH MG: at 17:52

## 2018-12-01 RX ADMIN — HYDROCODONE BITARTRATE AND ACETAMINOPHEN PRN TAB: 10; 325 TABLET ORAL at 23:43

## 2018-12-01 RX ADMIN — Medication SCH MMU: at 08:17

## 2018-12-01 RX ADMIN — HYDROCODONE BITARTRATE AND ACETAMINOPHEN PRN TAB: 10; 325 TABLET ORAL at 14:02

## 2018-12-01 RX ADMIN — FUROSEMIDE SCH MLS/HR: 10 INJECTION, SOLUTION INTRAMUSCULAR; INTRAVENOUS at 13:23

## 2018-12-01 RX ADMIN — IPRATROPIUM BROMIDE AND ALBUTEROL SULFATE SCH ML: .5; 3 SOLUTION RESPIRATORY (INHALATION) at 14:04

## 2018-12-01 RX ADMIN — IPRATROPIUM BROMIDE AND ALBUTEROL SULFATE SCH ML: .5; 3 SOLUTION RESPIRATORY (INHALATION) at 07:18

## 2018-12-01 RX ADMIN — METHYLPREDNISOLONE SODIUM SUCCINATE SCH MG: 40 INJECTION, POWDER, FOR SOLUTION INTRAMUSCULAR; INTRAVENOUS at 20:30

## 2018-12-01 RX ADMIN — IPRATROPIUM BROMIDE AND ALBUTEROL SULFATE SCH ML: .5; 3 SOLUTION RESPIRATORY (INHALATION) at 23:11

## 2018-12-01 RX ADMIN — Medication SCH MG: at 08:18

## 2018-12-02 VITALS — DIASTOLIC BLOOD PRESSURE: 65 MMHG | SYSTOLIC BLOOD PRESSURE: 100 MMHG

## 2018-12-02 VITALS — DIASTOLIC BLOOD PRESSURE: 73 MMHG | SYSTOLIC BLOOD PRESSURE: 110 MMHG

## 2018-12-02 VITALS — SYSTOLIC BLOOD PRESSURE: 118 MMHG | DIASTOLIC BLOOD PRESSURE: 75 MMHG

## 2018-12-02 VITALS — DIASTOLIC BLOOD PRESSURE: 57 MMHG | SYSTOLIC BLOOD PRESSURE: 96 MMHG

## 2018-12-02 VITALS — DIASTOLIC BLOOD PRESSURE: 62 MMHG | SYSTOLIC BLOOD PRESSURE: 103 MMHG

## 2018-12-02 LAB
ALBUMIN SERPL BCP-MCNC: 3.7 G/DL (ref 3.4–5)
ALBUMIN SERPL BCP-MCNC: 3.8 G/DL (ref 3.4–5)
ALBUMIN SERPL BCP-MCNC: 3.8 G/DL (ref 3.4–5)
ALBUMIN SERPL BCP-MCNC: 3.9 G/DL (ref 3.4–5)
ALBUMIN/GLOB SERPL: 1.2 {RATIO} (ref 1.1–1.5)
ALBUMIN/GLOB SERPL: 1.2 {RATIO} (ref 1.1–1.5)
ALP SERPL-CCNC: 103 IU/L (ref 46–116)
ALP SERPL-CCNC: 108 IU/L (ref 46–116)
ALT SERPL W P-5'-P-CCNC: 28 U/L (ref 12–78)
ALT SERPL W P-5'-P-CCNC: 29 U/L (ref 12–78)
ANION GAP SERPL CALCULATED.3IONS-SCNC: 2 MMOL/L (ref 8–16)
ANION GAP SERPL CALCULATED.3IONS-SCNC: 4 MMOL/L (ref 8–16)
ANION GAP SERPL CALCULATED.3IONS-SCNC: 4 MMOL/L (ref 8–16)
ANION GAP SERPL CALCULATED.3IONS-SCNC: 5 MMOL/L (ref 8–16)
AST SERPL W P-5'-P-CCNC: 13 U/L (ref 10–37)
AST SERPL W P-5'-P-CCNC: 17 U/L (ref 10–37)
BASE EXCESS BLDA CALC-SCNC: 14.9 MMOL/L (ref -2–3)
BASOPHILS # BLD AUTO: 0 X10'3 (ref 0–0.2)
BASOPHILS NFR BLD AUTO: 0.2 % (ref 0–1)
BILIRUB SERPL-MCNC: 0.4 MG/DL (ref 0.1–1)
BILIRUB SERPL-MCNC: 0.5 MG/DL (ref 0.1–1)
BODY TEMPERATURE: 37
BUN SERPL-MCNC: 41 MG/DL (ref 7–18)
BUN SERPL-MCNC: 42 MG/DL (ref 7–18)
BUN SERPL-MCNC: 42 MG/DL (ref 7–18)
BUN SERPL-MCNC: 47 MG/DL (ref 7–18)
BUN/CREAT SERPL: 24.4 (ref 5.4–32)
BUN/CREAT SERPL: 25.1 (ref 5.4–32)
BUN/CREAT SERPL: 25.8 (ref 5.4–32)
BUN/CREAT SERPL: 25.8 (ref 5.4–32)
CALCIUM SERPL-MCNC: 8.7 MG/DL (ref 8.5–10.1)
CALCIUM SERPL-MCNC: 8.8 MG/DL (ref 8.5–10.1)
CALCIUM SERPL-MCNC: 8.9 MG/DL (ref 8.5–10.1)
CALCIUM SERPL-MCNC: 9 MG/DL (ref 8.5–10.1)
CHLORIDE SERPL-SCNC: 90 MMOL/L (ref 99–107)
CHLORIDE SERPL-SCNC: 90 MMOL/L (ref 99–107)
CHLORIDE SERPL-SCNC: 91 MMOL/L (ref 99–107)
CHLORIDE SERPL-SCNC: 92 MMOL/L (ref 99–107)
CO2 SERPL-SCNC: 36.6 MMOL/L (ref 24–32)
CO2 SERPL-SCNC: 39.3 MMOL/L (ref 24–32)
CO2 SERPL-SCNC: 41.4 MMOL/L (ref 24–32)
CO2 SERPL-SCNC: 42.9 MMOL/L (ref 24–32)
COHGB MFR BLDA: 0.4 % (ref 0.5–1.5)
CREAT SERPL-MCNC: 1.63 MG/DL (ref 0.6–1.1)
CREAT SERPL-MCNC: 1.67 MG/DL (ref 0.6–1.1)
CREAT SERPL-MCNC: 1.68 MG/DL (ref 0.6–1.1)
CREAT SERPL-MCNC: 1.82 MG/DL (ref 0.6–1.1)
EOSINOPHIL # BLD AUTO: 0.2 X10'3 (ref 0–0.9)
EOSINOPHIL NFR BLD AUTO: 1 % (ref 0–6)
ERYTHROCYTE [DISTWIDTH] IN BLOOD BY AUTOMATED COUNT: 17.7 % (ref 11.5–14.5)
GFR SERPL CREATININE-BSD FRML MDRD: 37 ML/MIN
GFR SERPL CREATININE-BSD FRML MDRD: 41 ML/MIN
GFR SERPL CREATININE-BSD FRML MDRD: 41 ML/MIN
GFR SERPL CREATININE-BSD FRML MDRD: 42 ML/MIN
GLUCOSE SERPL-MCNC: 117 MG/DL (ref 70–104)
GLUCOSE SERPL-MCNC: 130 MG/DL (ref 70–104)
GLUCOSE SERPL-MCNC: 140 MG/DL (ref 70–104)
GLUCOSE SERPL-MCNC: 148 MG/DL (ref 70–104)
HCO3 BLDA-SCNC: 41.2 MMOL/L (ref 22–26)
HCT VFR BLD AUTO: 27.7 % (ref 42–52)
HGB BLD-MCNC: 8.6 G/DL (ref 14–17.9)
HGB BLDA-MCNC: 10.5 G/DL (ref 14–18)
INHALED O2 FLOW RATE: 5 L/MIN
LYMPHOCYTES # BLD AUTO: 7 X10'3 (ref 1.1–4.8)
LYMPHOCYTES NFR BLD AUTO: 43.6 % (ref 21–51)
MAGNESIUM SERPL-MCNC: 1.5 MG/DL (ref 1.5–2.4)
MCH RBC QN AUTO: 24.9 PG (ref 27–31)
MCHC RBC AUTO-ENTMCNC: 30.9 % (ref 33–36.5)
MCV RBC AUTO: 80.4 FL (ref 78–98)
METHGB MFR BLDA: 0.2 % (ref 0.3–1.12)
MONOCYTES # BLD AUTO: 0.3 X10'3 (ref 0–0.9)
MONOCYTES NFR BLD AUTO: 1.6 % (ref 2–12)
NEUTROPHILS # BLD AUTO: 8.7 X10'3 (ref 1.8–7.7)
NEUTROPHILS NFR BLD AUTO: 53.6 % (ref 42–75)
OXYHGB MFR BLDA: 97.3 % (ref 94–100)
PCO2 TEMP ADJ BLDA: 61.5 MMHG (ref 35–48)
PH TEMP ADJ BLDA: 7.44 [PH] (ref 7.35–7.45)
PHOSPHATE SERPL-MCNC: 3.6 MG/DL (ref 2.3–4.5)
PHOSPHATE SERPL-MCNC: 3.7 MG/DL (ref 2.3–4.5)
PHOSPHATE SERPL-MCNC: 3.9 MG/DL (ref 2.3–4.5)
PHOSPHATE SERPL-MCNC: 4.1 MG/DL (ref 2.3–4.5)
PLATELET # BLD AUTO: 148 X10'3 (ref 140–440)
PMV BLD AUTO: 9.6 FL (ref 7.4–10.4)
PO2 TEMP ADJ BLD: 111.5 MMHG (ref 83–108)
POTASSIUM SERPL-SCNC: 3.8 MMOL/L (ref 3.5–5.1)
POTASSIUM SERPL-SCNC: 3.9 MMOL/L (ref 3.5–5.1)
POTASSIUM SERPL-SCNC: 3.9 MMOL/L (ref 3.5–5.1)
POTASSIUM SERPL-SCNC: 4.3 MMOL/L (ref 3.5–5.1)
PROT SERPL-MCNC: 6.9 G/DL (ref 6.4–8.2)
PROT SERPL-MCNC: 7.1 G/DL (ref 6.4–8.2)
RBC # BLD AUTO: 3.44 X10'6 (ref 4.7–6.1)
SAO2 % BLDA: 97.9 % (ref 95–98)
SODIUM SERPL-SCNC: 134 MMOL/L (ref 135–145)
SODIUM SERPL-SCNC: 134 MMOL/L (ref 135–145)
SODIUM SERPL-SCNC: 135 MMOL/L (ref 135–145)
SODIUM SERPL-SCNC: 135 MMOL/L (ref 135–145)
WBC # BLD AUTO: 16.1 X10'3 (ref 4.5–11)

## 2018-12-02 RX ADMIN — IPRATROPIUM BROMIDE AND ALBUTEROL SULFATE SCH ML: .5; 3 SOLUTION RESPIRATORY (INHALATION) at 19:29

## 2018-12-02 RX ADMIN — HYDROCODONE BITARTRATE AND ACETAMINOPHEN PRN TAB: 10; 325 TABLET ORAL at 16:53

## 2018-12-02 RX ADMIN — IPRATROPIUM BROMIDE AND ALBUTEROL SULFATE SCH ML: .5; 3 SOLUTION RESPIRATORY (INHALATION) at 11:16

## 2018-12-02 RX ADMIN — FUROSEMIDE SCH MLS/HR: 10 INJECTION, SOLUTION INTRAMUSCULAR; INTRAVENOUS at 10:08

## 2018-12-02 RX ADMIN — IPRATROPIUM BROMIDE AND ALBUTEROL SULFATE SCH ML: .5; 3 SOLUTION RESPIRATORY (INHALATION) at 03:03

## 2018-12-02 RX ADMIN — IPRATROPIUM BROMIDE AND ALBUTEROL SULFATE SCH ML: .5; 3 SOLUTION RESPIRATORY (INHALATION) at 07:09

## 2018-12-02 RX ADMIN — METHYLPREDNISOLONE SODIUM SUCCINATE SCH MG: 40 INJECTION, POWDER, FOR SOLUTION INTRAMUSCULAR; INTRAVENOUS at 20:57

## 2018-12-02 RX ADMIN — Medication SCH MMU: at 20:58

## 2018-12-02 RX ADMIN — IPRATROPIUM BROMIDE AND ALBUTEROL SULFATE SCH ML: .5; 3 SOLUTION RESPIRATORY (INHALATION) at 23:30

## 2018-12-02 RX ADMIN — HYDROCODONE BITARTRATE AND ACETAMINOPHEN PRN TAB: 10; 325 TABLET ORAL at 10:12

## 2018-12-02 RX ADMIN — Medication SCH MG: at 16:52

## 2018-12-02 RX ADMIN — PANTOPRAZOLE SODIUM SCH MG: 40 TABLET, DELAYED RELEASE ORAL at 08:27

## 2018-12-02 RX ADMIN — ONDANSETRON PRN MG: 2 INJECTION, SOLUTION INTRAMUSCULAR; INTRAVENOUS at 16:53

## 2018-12-02 RX ADMIN — METHYLPREDNISOLONE SODIUM SUCCINATE SCH MG: 40 INJECTION, POWDER, FOR SOLUTION INTRAMUSCULAR; INTRAVENOUS at 08:27

## 2018-12-02 RX ADMIN — FUROSEMIDE SCH MLS/HR: 10 INJECTION, SOLUTION INTRAMUSCULAR; INTRAVENOUS at 07:30

## 2018-12-02 RX ADMIN — BUDESONIDE SCH MG: 0.5 INHALANT RESPIRATORY (INHALATION) at 07:09

## 2018-12-02 RX ADMIN — Medication SCH MMU: at 08:28

## 2018-12-02 RX ADMIN — IPRATROPIUM BROMIDE AND ALBUTEROL SULFATE SCH ML: .5; 3 SOLUTION RESPIRATORY (INHALATION) at 15:12

## 2018-12-02 RX ADMIN — BUDESONIDE SCH MG: 0.5 INHALANT RESPIRATORY (INHALATION) at 19:29

## 2018-12-02 RX ADMIN — Medication SCH MG: at 08:28

## 2018-12-02 RX ADMIN — PANTOPRAZOLE SODIUM SCH MG: 40 TABLET, DELAYED RELEASE ORAL at 20:57

## 2018-12-03 VITALS — SYSTOLIC BLOOD PRESSURE: 100 MMHG | DIASTOLIC BLOOD PRESSURE: 63 MMHG

## 2018-12-03 VITALS — SYSTOLIC BLOOD PRESSURE: 100 MMHG | DIASTOLIC BLOOD PRESSURE: 64 MMHG

## 2018-12-03 VITALS — DIASTOLIC BLOOD PRESSURE: 63 MMHG | SYSTOLIC BLOOD PRESSURE: 100 MMHG

## 2018-12-03 VITALS — SYSTOLIC BLOOD PRESSURE: 92 MMHG | DIASTOLIC BLOOD PRESSURE: 65 MMHG

## 2018-12-03 VITALS — SYSTOLIC BLOOD PRESSURE: 104 MMHG | DIASTOLIC BLOOD PRESSURE: 60 MMHG

## 2018-12-03 VITALS — DIASTOLIC BLOOD PRESSURE: 64 MMHG | SYSTOLIC BLOOD PRESSURE: 99 MMHG

## 2018-12-03 LAB
ALBUMIN SERPL BCP-MCNC: 3.6 G/DL (ref 3.4–5)
ANION GAP SERPL CALCULATED.3IONS-SCNC: 3 MMOL/L (ref 8–16)
ANISOCYTOSIS BLD QL SMEAR: (no result)
BASOPHILS # BLD AUTO: 0.1 X10'3 (ref 0–0.2)
BASOPHILS # BLD AUTO: 0.2 X10'3 (ref 0–0.2)
BASOPHILS NFR BLD AUTO: 0.4 % (ref 0–1)
BASOPHILS NFR BLD AUTO: 0.6 % (ref 0–1)
BUN SERPL-MCNC: 44 MG/DL (ref 7–18)
BUN/CREAT SERPL: 25 (ref 5.4–32)
CALCIUM SERPL-MCNC: 8.7 MG/DL (ref 8.5–10.1)
CHLORIDE SERPL-SCNC: 91 MMOL/L (ref 99–107)
CO2 SERPL-SCNC: 40.8 MMOL/L (ref 24–32)
CREAT SERPL-MCNC: 1.76 MG/DL (ref 0.6–1.1)
EOSINOPHIL # BLD AUTO: 0.1 X10'3 (ref 0–0.9)
EOSINOPHIL # BLD AUTO: 0.2 X10'3 (ref 0–0.9)
EOSINOPHIL NFR BLD AUTO: 0.5 % (ref 0–6)
EOSINOPHIL NFR BLD AUTO: 0.7 % (ref 0–6)
ERYTHROCYTE [DISTWIDTH] IN BLOOD BY AUTOMATED COUNT: 18.5 % (ref 11.5–14.5)
ERYTHROCYTE [DISTWIDTH] IN BLOOD BY AUTOMATED COUNT: 18.6 % (ref 11.5–14.5)
GFR SERPL CREATININE-BSD FRML MDRD: 39 ML/MIN
GLUCOSE SERPL-MCNC: 159 MG/DL (ref 70–104)
HCT VFR BLD AUTO: 29.4 % (ref 42–52)
HCT VFR BLD AUTO: 30.5 % (ref 42–52)
HGB BLD-MCNC: 8.9 G/DL (ref 14–17.9)
HGB BLD-MCNC: 9.3 G/DL (ref 14–17.9)
HYPOCHROMIA BLD QL SMEAR: (no result)
LYMPHOCYTES # BLD AUTO: 11.1 X10'3 (ref 1.1–4.8)
LYMPHOCYTES # BLD AUTO: 14.5 X10'3 (ref 1.1–4.8)
LYMPHOCYTES NFR BLD AUTO: 51.8 % (ref 21–51)
LYMPHOCYTES NFR BLD AUTO: 53.3 % (ref 21–51)
LYMPHOCYTES NFR BLD MANUAL: 51 % (ref 21–51)
MAGNESIUM SERPL-MCNC: 1.5 MG/DL (ref 1.5–2.4)
MCH RBC QN AUTO: 24.4 PG (ref 27–31)
MCH RBC QN AUTO: 24.6 PG (ref 27–31)
MCHC RBC AUTO-ENTMCNC: 30.4 % (ref 33–36.5)
MCHC RBC AUTO-ENTMCNC: 30.6 % (ref 33–36.5)
MCV RBC AUTO: 80.1 FL (ref 78–98)
MCV RBC AUTO: 80.5 FL (ref 78–98)
MONOCYTES # BLD AUTO: 0.1 X10'3 (ref 0–0.9)
MONOCYTES # BLD AUTO: 0.3 X10'3 (ref 0–0.9)
MONOCYTES NFR BLD AUTO: 0.5 % (ref 2–12)
MONOCYTES NFR BLD AUTO: 1.5 % (ref 2–12)
MONOCYTES NFR BLD MANUAL: 3 % (ref 2–12)
NEUTROPHILS # BLD AUTO: 12.2 X10'3 (ref 1.8–7.7)
NEUTROPHILS # BLD AUTO: 9.8 X10'3 (ref 1.8–7.7)
NEUTROPHILS NFR BLD AUTO: 44.9 % (ref 42–75)
NEUTROPHILS NFR BLD AUTO: 45.8 % (ref 42–75)
NEUTS SEG NFR BLD MANUAL: 46 % (ref 42–75)
PLATELET # BLD AUTO: 178 X10'3 (ref 140–440)
PLATELET # BLD AUTO: 191 X10'3 (ref 140–440)
PLATELET BLD QL SMEAR: NORMAL
PMV BLD AUTO: 8.7 FL (ref 7.4–10.4)
PMV BLD AUTO: 9.2 FL (ref 7.4–10.4)
POTASSIUM SERPL-SCNC: 4.3 MMOL/L (ref 3.5–5.1)
RBC # BLD AUTO: 3.67 X10'6 (ref 4.7–6.1)
RBC # BLD AUTO: 3.78 X10'6 (ref 4.7–6.1)
RBC MORPH BLD: (no result)
SMUDGE CELLS BLD QL SMEAR: (no result)
SODIUM SERPL-SCNC: 135 MMOL/L (ref 135–145)
TOTAL CELLS COUNTED FLD: 100
WBC # BLD AUTO: 21.4 X10'3 (ref 4.5–11)
WBC # BLD AUTO: 27.2 X10'3 (ref 4.5–11)

## 2018-12-03 RX ADMIN — IPRATROPIUM BROMIDE AND ALBUTEROL SULFATE SCH ML: .5; 3 SOLUTION RESPIRATORY (INHALATION) at 12:56

## 2018-12-03 RX ADMIN — BUDESONIDE SCH MG: 0.5 INHALANT RESPIRATORY (INHALATION) at 19:30

## 2018-12-03 RX ADMIN — IPRATROPIUM BROMIDE AND ALBUTEROL SULFATE SCH ML: .5; 3 SOLUTION RESPIRATORY (INHALATION) at 19:30

## 2018-12-03 RX ADMIN — Medication SCH MMU: at 08:08

## 2018-12-03 RX ADMIN — IPRATROPIUM BROMIDE AND ALBUTEROL SULFATE SCH ML: .5; 3 SOLUTION RESPIRATORY (INHALATION) at 03:22

## 2018-12-03 RX ADMIN — HYDROCODONE BITARTRATE AND ACETAMINOPHEN PRN TAB: 10; 325 TABLET ORAL at 12:55

## 2018-12-03 RX ADMIN — HYDROCODONE BITARTRATE AND ACETAMINOPHEN PRN TAB: 10; 325 TABLET ORAL at 04:40

## 2018-12-03 RX ADMIN — Medication SCH MG: at 08:08

## 2018-12-03 RX ADMIN — PANTOPRAZOLE SODIUM SCH MG: 40 TABLET, DELAYED RELEASE ORAL at 20:50

## 2018-12-03 RX ADMIN — SULFAMETHOXAZOLE AND TRIMETHOPRIM SCH TAB: 800; 160 TABLET ORAL at 17:00

## 2018-12-03 RX ADMIN — IPRATROPIUM BROMIDE AND ALBUTEROL SULFATE SCH ML: .5; 3 SOLUTION RESPIRATORY (INHALATION) at 23:15

## 2018-12-03 RX ADMIN — IPRATROPIUM BROMIDE AND ALBUTEROL SULFATE SCH ML: .5; 3 SOLUTION RESPIRATORY (INHALATION) at 16:39

## 2018-12-03 RX ADMIN — IPRATROPIUM BROMIDE AND ALBUTEROL SULFATE SCH ML: .5; 3 SOLUTION RESPIRATORY (INHALATION) at 08:55

## 2018-12-03 RX ADMIN — PANTOPRAZOLE SODIUM SCH MG: 40 TABLET, DELAYED RELEASE ORAL at 08:08

## 2018-12-03 RX ADMIN — Medication SCH MMU: at 20:49

## 2018-12-03 RX ADMIN — HYDROCODONE BITARTRATE AND ACETAMINOPHEN PRN TAB: 10; 325 TABLET ORAL at 18:49

## 2018-12-03 RX ADMIN — BUDESONIDE SCH MG: 0.5 INHALANT RESPIRATORY (INHALATION) at 08:55

## 2018-12-03 RX ADMIN — HYDROCODONE BITARTRATE AND ACETAMINOPHEN PRN TAB: 10; 325 TABLET ORAL at 08:52

## 2018-12-03 RX ADMIN — METHYLPREDNISOLONE SODIUM SUCCINATE SCH MG: 40 INJECTION, POWDER, FOR SOLUTION INTRAMUSCULAR; INTRAVENOUS at 08:38

## 2018-12-04 VITALS — DIASTOLIC BLOOD PRESSURE: 72 MMHG | SYSTOLIC BLOOD PRESSURE: 94 MMHG

## 2018-12-04 VITALS — SYSTOLIC BLOOD PRESSURE: 100 MMHG | DIASTOLIC BLOOD PRESSURE: 62 MMHG

## 2018-12-04 VITALS — DIASTOLIC BLOOD PRESSURE: 55 MMHG | SYSTOLIC BLOOD PRESSURE: 95 MMHG

## 2018-12-04 LAB
ALBUMIN SERPL BCP-MCNC: 3.6 G/DL (ref 3.4–5)
ANION GAP SERPL CALCULATED.3IONS-SCNC: 2 MMOL/L (ref 8–16)
ANISOCYTOSIS BLD QL SMEAR: (no result)
BASOPHILS # BLD AUTO: 0 X10'3 (ref 0–0.2)
BASOPHILS NFR BLD AUTO: 0.2 % (ref 0–1)
BUN SERPL-MCNC: 36 MG/DL (ref 7–18)
BUN/CREAT SERPL: 24 (ref 5.4–32)
CALCIUM SERPL-MCNC: 8.9 MG/DL (ref 8.5–10.1)
CHLORIDE SERPL-SCNC: 91 MMOL/L (ref 99–107)
CO2 SERPL-SCNC: 41.1 MMOL/L (ref 24–32)
CREAT SERPL-MCNC: 1.5 MG/DL (ref 0.6–1.1)
EOSINOPHIL # BLD AUTO: 0.2 X10'3 (ref 0–0.9)
EOSINOPHIL NFR BLD AUTO: 1 % (ref 0–6)
ERYTHROCYTE [DISTWIDTH] IN BLOOD BY AUTOMATED COUNT: 18.4 % (ref 11.5–14.5)
GFR SERPL CREATININE-BSD FRML MDRD: 47 ML/MIN
GLUCOSE SERPL-MCNC: 101 MG/DL (ref 70–104)
HCT VFR BLD AUTO: 30.2 % (ref 42–52)
HGB BLD-MCNC: 9.2 G/DL (ref 14–17.9)
HYPOCHROMIA BLD QL SMEAR: (no result)
LYMPHOCYTES # BLD AUTO: 10.1 X10'3 (ref 1.1–4.8)
LYMPHOCYTES NFR BLD AUTO: 53.7 % (ref 21–51)
MAGNESIUM SERPL-MCNC: 1.6 MG/DL (ref 1.5–2.4)
MCH RBC QN AUTO: 24.5 PG (ref 27–31)
MCHC RBC AUTO-ENTMCNC: 30.6 % (ref 33–36.5)
MCV RBC AUTO: 80.1 FL (ref 78–98)
MONOCYTES # BLD AUTO: 0.6 X10'3 (ref 0–0.9)
MONOCYTES NFR BLD AUTO: 3.1 % (ref 2–12)
NEUTROPHILS # BLD AUTO: 7.9 X10'3 (ref 1.8–7.7)
NEUTROPHILS NFR BLD AUTO: 42 % (ref 42–75)
PLATELET # BLD AUTO: 177 X10'3 (ref 140–440)
PLATELET BLD QL SMEAR: NORMAL
PMV BLD AUTO: 8.8 FL (ref 7.4–10.4)
POTASSIUM SERPL-SCNC: 3.9 MMOL/L (ref 3.5–5.1)
RBC # BLD AUTO: 3.77 X10'6 (ref 4.7–6.1)
RBC MORPH BLD: (no result)
SODIUM SERPL-SCNC: 134 MMOL/L (ref 135–145)
WBC # BLD AUTO: 18.9 X10'3 (ref 4.5–11)

## 2018-12-04 RX ADMIN — BUDESONIDE SCH MG: 0.5 INHALANT RESPIRATORY (INHALATION) at 07:28

## 2018-12-04 RX ADMIN — IPRATROPIUM BROMIDE AND ALBUTEROL SULFATE SCH ML: .5; 3 SOLUTION RESPIRATORY (INHALATION) at 12:02

## 2018-12-04 RX ADMIN — PANTOPRAZOLE SODIUM SCH MG: 40 TABLET, DELAYED RELEASE ORAL at 08:26

## 2018-12-04 RX ADMIN — IPRATROPIUM BROMIDE AND ALBUTEROL SULFATE SCH ML: .5; 3 SOLUTION RESPIRATORY (INHALATION) at 07:27

## 2018-12-04 RX ADMIN — Medication SCH MMU: at 08:28

## 2018-12-04 RX ADMIN — IPRATROPIUM BROMIDE AND ALBUTEROL SULFATE SCH ML: .5; 3 SOLUTION RESPIRATORY (INHALATION) at 03:24

## 2018-12-04 RX ADMIN — HYDROCODONE BITARTRATE AND ACETAMINOPHEN PRN TAB: 10; 325 TABLET ORAL at 10:26

## 2018-12-04 RX ADMIN — SULFAMETHOXAZOLE AND TRIMETHOPRIM SCH TAB: 800; 160 TABLET ORAL at 08:24

## 2019-02-04 ENCOUNTER — HOSPITAL ENCOUNTER (INPATIENT)
Dept: HOSPITAL 94 - ER | Age: 69
LOS: 3 days | Discharge: HOME | End: 2019-02-07
Payer: MEDICARE

## 2019-02-04 DIAGNOSIS — J18.1: ICD-10-CM

## 2019-02-04 DIAGNOSIS — J44.1: ICD-10-CM

## 2019-02-04 DIAGNOSIS — N17.9: ICD-10-CM

## 2019-02-04 DIAGNOSIS — A41.9: Primary | ICD-10-CM

## 2019-02-04 DIAGNOSIS — J96.01: ICD-10-CM

## 2019-02-04 DIAGNOSIS — N18.3: ICD-10-CM

## 2019-02-04 DIAGNOSIS — J44.0: ICD-10-CM

## 2019-02-27 ENCOUNTER — HOSPITAL ENCOUNTER (EMERGENCY)
Dept: HOSPITAL 94 - ER | Age: 69
Discharge: HOME | End: 2019-02-27
Payer: MEDICARE

## 2019-02-27 VITALS — BODY MASS INDEX: 30.7 KG/M2 | HEIGHT: 74 IN | WEIGHT: 239.2 LBS

## 2019-02-27 VITALS — SYSTOLIC BLOOD PRESSURE: 95 MMHG | DIASTOLIC BLOOD PRESSURE: 70 MMHG

## 2019-02-27 DIAGNOSIS — J44.9: ICD-10-CM

## 2019-02-27 DIAGNOSIS — I48.91: ICD-10-CM

## 2019-02-27 DIAGNOSIS — I50.9: ICD-10-CM

## 2019-02-27 DIAGNOSIS — K21.9: ICD-10-CM

## 2019-02-27 DIAGNOSIS — R42: Primary | ICD-10-CM

## 2019-02-27 DIAGNOSIS — I11.0: ICD-10-CM

## 2019-02-27 LAB
ALBUMIN SERPL BCP-MCNC: 3.6 G/DL (ref 3.4–5)
ALBUMIN/GLOB SERPL: 1.1 {RATIO} (ref 1.1–1.5)
ALP SERPL-CCNC: 107 IU/L (ref 46–116)
ALT SERPL W P-5'-P-CCNC: 13 U/L (ref 12–78)
ANION GAP SERPL CALCULATED.3IONS-SCNC: 9 MMOL/L (ref 8–16)
AST SERPL W P-5'-P-CCNC: 13 U/L (ref 10–37)
BASOPHILS # BLD AUTO: 0.1 X10'3 (ref 0–0.2)
BASOPHILS NFR BLD AUTO: 0.5 % (ref 0–1)
BILIRUB SERPL-MCNC: 0.3 MG/DL (ref 0.1–1)
BUN SERPL-MCNC: 17 MG/DL (ref 7–18)
BUN/CREAT SERPL: 10.8 (ref 5.4–32)
CALCIUM SERPL-MCNC: 8.5 MG/DL (ref 8.5–10.1)
CHLORIDE SERPL-SCNC: 97 MMOL/L (ref 99–107)
CLARITY UR: CLEAR
CO2 SERPL-SCNC: 28.3 MMOL/L (ref 24–32)
COLOR UR: YELLOW
CREAT SERPL-MCNC: 1.57 MG/DL (ref 0.6–1.1)
EOSINOPHIL # BLD AUTO: 0.2 X10'3 (ref 0–0.9)
EOSINOPHIL NFR BLD AUTO: 1.2 % (ref 0–6)
ERYTHROCYTE [DISTWIDTH] IN BLOOD BY AUTOMATED COUNT: 17.6 % (ref 11.5–14.5)
GFR SERPL CREATININE-BSD FRML MDRD: 44 ML/MIN
GLUCOSE SERPL-MCNC: 135 MG/DL (ref 70–104)
GLUCOSE UR STRIP-MCNC: NEGATIVE MG/DL
HCT VFR BLD AUTO: 32.3 % (ref 42–52)
HGB BLD-MCNC: 10 G/DL (ref 14–17.9)
HGB UR QL STRIP: NEGATIVE
KETONES UR STRIP-MCNC: NEGATIVE MG/DL
LEUKOCYTE ESTERASE UR QL STRIP: NEGATIVE
LYMPHOCYTES # BLD AUTO: 7.4 X10'3 (ref 1.1–4.8)
LYMPHOCYTES NFR BLD AUTO: 60.7 % (ref 21–51)
MCH RBC QN AUTO: 22.8 PG (ref 27–31)
MCHC RBC AUTO-ENTMCNC: 31 G/DL (ref 33–36.5)
MCV RBC AUTO: 73.6 FL (ref 78–98)
MONOCYTES # BLD AUTO: 0.4 X10'3 (ref 0–0.9)
MONOCYTES NFR BLD AUTO: 3.1 % (ref 2–12)
NEUTROPHILS # BLD AUTO: 4.2 X10'3 (ref 1.8–7.7)
NEUTROPHILS NFR BLD AUTO: 34.5 % (ref 42–75)
NITRITE UR QL STRIP: NEGATIVE
PH UR STRIP: 7.5 [PH] (ref 4.8–8)
PLATELET # BLD AUTO: 139 X10'3 (ref 140–440)
PMV BLD AUTO: 7.8 FL (ref 7.4–10.4)
POTASSIUM SERPL-SCNC: 4.3 MMOL/L (ref 3.5–5.1)
PROT SERPL-MCNC: 6.8 G/DL (ref 6.4–8.2)
PROT UR QL STRIP: NEGATIVE MG/DL
RBC # BLD AUTO: 4.39 X10'6 (ref 4.7–6.1)
SODIUM SERPL-SCNC: 134 MMOL/L (ref 135–145)
SP GR UR STRIP: <=1.005 (ref 1–1.03)
URN COLLECT METHOD CLASS: (no result)
UROBILINOGEN UR STRIP-MCNC: 0.2 E.U/DL (ref 0.2–1)
WBC # BLD AUTO: 12.2 X10'3 (ref 4.5–11)

## 2019-02-27 PROCEDURE — 93005 ELECTROCARDIOGRAM TRACING: CPT

## 2019-02-27 PROCEDURE — 36415 COLL VENOUS BLD VENIPUNCTURE: CPT

## 2019-02-27 PROCEDURE — 99284 EMERGENCY DEPT VISIT MOD MDM: CPT

## 2019-02-27 PROCEDURE — 84484 ASSAY OF TROPONIN QUANT: CPT

## 2019-02-27 PROCEDURE — 80053 COMPREHEN METABOLIC PANEL: CPT

## 2019-02-27 PROCEDURE — 71045 X-RAY EXAM CHEST 1 VIEW: CPT

## 2019-02-27 PROCEDURE — 83880 ASSAY OF NATRIURETIC PEPTIDE: CPT

## 2019-02-27 PROCEDURE — 85025 COMPLETE CBC W/AUTO DIFF WBC: CPT

## 2019-02-27 PROCEDURE — 81003 URINALYSIS AUTO W/O SCOPE: CPT

## 2019-02-27 PROCEDURE — 83605 ASSAY OF LACTIC ACID: CPT

## 2019-02-28 LAB
ANISOCYTOSIS BLD QL SMEAR: (no result)
DACRYOCYTES BLD QL SMEAR: (no result)
HYPOCHROMIA BLD QL SMEAR: (no result)
MICROCYTES BLD QL SMEAR: (no result)
OVALOCYTES BLD QL SMEAR: (no result)
PLATELET BLD QL SMEAR: (no result)
RBC MORPH BLD: (no result)

## 2019-03-18 ENCOUNTER — HOSPITAL ENCOUNTER (EMERGENCY)
Dept: HOSPITAL 94 - ER | Age: 69
Discharge: HOME | End: 2019-03-18
Payer: MEDICARE

## 2019-03-18 VITALS — HEIGHT: 74 IN | WEIGHT: 244.6 LBS | BODY MASS INDEX: 31.39 KG/M2

## 2019-03-18 VITALS — SYSTOLIC BLOOD PRESSURE: 111 MMHG | DIASTOLIC BLOOD PRESSURE: 66 MMHG

## 2019-03-18 DIAGNOSIS — K21.9: ICD-10-CM

## 2019-03-18 DIAGNOSIS — R04.2: Primary | ICD-10-CM

## 2019-03-18 DIAGNOSIS — I48.91: ICD-10-CM

## 2019-03-18 DIAGNOSIS — J44.9: ICD-10-CM

## 2019-03-18 DIAGNOSIS — I50.9: ICD-10-CM

## 2019-03-18 DIAGNOSIS — I11.0: ICD-10-CM

## 2019-03-18 DIAGNOSIS — R06.02: ICD-10-CM

## 2019-03-18 LAB
ALBUMIN SERPL BCP-MCNC: 4.2 G/DL (ref 3.4–5)
ALBUMIN/GLOB SERPL: 1.3 {RATIO} (ref 1.1–1.5)
ALP SERPL-CCNC: 103 IU/L (ref 46–116)
ALT SERPL W P-5'-P-CCNC: 18 U/L (ref 12–78)
ANION GAP SERPL CALCULATED.3IONS-SCNC: 9 MMOL/L (ref 8–16)
ANISOCYTOSIS BLD QL SMEAR: (no result)
AST SERPL W P-5'-P-CCNC: 15 U/L (ref 10–37)
BASOPHILS # BLD AUTO: 0.1 X10'3 (ref 0–0.2)
BASOPHILS NFR BLD AUTO: 0.3 % (ref 0–1)
BILIRUB SERPL-MCNC: 0.4 MG/DL (ref 0.1–1)
BLASTS NFR BLD MANUAL: 6 % (ref 0–0)
BUN SERPL-MCNC: 19 MG/DL (ref 7–18)
BUN/CREAT SERPL: 11.7 (ref 5.4–32)
CALCIUM SERPL-MCNC: 9.1 MG/DL (ref 8.5–10.1)
CHLORIDE SERPL-SCNC: 98 MMOL/L (ref 99–107)
CLARITY UR: CLEAR
CO2 SERPL-SCNC: 27.8 MMOL/L (ref 24–32)
COLOR UR: (no result)
CREAT SERPL-MCNC: 1.63 MG/DL (ref 0.6–1.1)
EOSINOPHIL # BLD AUTO: 0.2 X10'3 (ref 0–0.9)
EOSINOPHIL NFR BLD AUTO: 0.7 % (ref 0–6)
EOSINOPHIL NFR BLD MANUAL: 1 % (ref 0–6)
ERYTHROCYTE [DISTWIDTH] IN BLOOD BY AUTOMATED COUNT: 17.2 % (ref 11.5–14.5)
GFR SERPL CREATININE-BSD FRML MDRD: 42 ML/MIN
GLUCOSE SERPL-MCNC: 134 MG/DL (ref 70–104)
GLUCOSE UR STRIP-MCNC: NEGATIVE MG/DL
HCT VFR BLD AUTO: 34.9 % (ref 42–52)
HGB BLD-MCNC: 10.6 G/DL (ref 14–17.9)
HGB UR QL STRIP: NEGATIVE
KETONES UR STRIP-MCNC: NEGATIVE MG/DL
LEUKOCYTE ESTERASE UR QL STRIP: NEGATIVE
LYMPHOCYTES # BLD AUTO: 18.2 X10'3 (ref 1.1–4.8)
LYMPHOCYTES NFR BLD AUTO: 71.9 % (ref 21–51)
LYMPHOCYTES NFR BLD MANUAL: 71 % (ref 21–51)
MCH RBC QN AUTO: 22.2 PG (ref 27–31)
MCHC RBC AUTO-ENTMCNC: 30.4 G/DL (ref 33–36.5)
MCV RBC AUTO: 73 FL (ref 78–98)
MICROCYTES BLD QL SMEAR: (no result)
MONOCYTES # BLD AUTO: 0.6 X10'3 (ref 0–0.9)
MONOCYTES NFR BLD AUTO: 2.6 % (ref 2–12)
MONOCYTES NFR BLD MANUAL: 1 % (ref 2–12)
NEUTROPHILS # BLD AUTO: 6.2 X10'3 (ref 1.8–7.7)
NEUTROPHILS NFR BLD AUTO: 24.5 % (ref 42–75)
NEUTS SEG NFR BLD MANUAL: 21 % (ref 42–75)
NITRITE UR QL STRIP: NEGATIVE
PH UR STRIP: 6 [PH] (ref 4.8–8)
PLATELET # BLD AUTO: 173 X10'3 (ref 140–440)
PLATELET BLD QL SMEAR: NORMAL
PMV BLD AUTO: 7.5 FL (ref 7.4–10.4)
POTASSIUM SERPL-SCNC: 4.2 MMOL/L (ref 3.5–5.1)
PROT SERPL-MCNC: 7.4 G/DL (ref 6.4–8.2)
PROT UR QL STRIP: NEGATIVE MG/DL
RBC # BLD AUTO: 4.78 X10'6 (ref 4.7–6.1)
RBC MORPH BLD: (no result)
SMUDGE CELLS BLD QL SMEAR: (no result)
SODIUM SERPL-SCNC: 135 MMOL/L (ref 135–145)
SP GR UR STRIP: <=1.005 (ref 1–1.03)
TOTAL CELLS COUNTED FLD: 100
URN COLLECT METHOD CLASS: (no result)
UROBILINOGEN UR STRIP-MCNC: 0.2 E.U/DL (ref 0.2–1)
WBC # BLD AUTO: 25.3 X10'3 (ref 4.5–11)

## 2019-03-18 PROCEDURE — 93005 ELECTROCARDIOGRAM TRACING: CPT

## 2019-03-18 PROCEDURE — 81003 URINALYSIS AUTO W/O SCOPE: CPT

## 2019-03-18 PROCEDURE — 99284 EMERGENCY DEPT VISIT MOD MDM: CPT

## 2019-03-18 PROCEDURE — 80053 COMPREHEN METABOLIC PANEL: CPT

## 2019-03-18 PROCEDURE — 87040 BLOOD CULTURE FOR BACTERIA: CPT

## 2019-03-18 PROCEDURE — 84145 PROCALCITONIN (PCT): CPT

## 2019-03-18 PROCEDURE — 71046 X-RAY EXAM CHEST 2 VIEWS: CPT

## 2019-03-18 PROCEDURE — 85025 COMPLETE CBC W/AUTO DIFF WBC: CPT

## 2019-03-18 PROCEDURE — 83605 ASSAY OF LACTIC ACID: CPT

## 2019-03-18 PROCEDURE — 36415 COLL VENOUS BLD VENIPUNCTURE: CPT

## 2019-04-21 ENCOUNTER — HOSPITAL ENCOUNTER (EMERGENCY)
Dept: HOSPITAL 94 - ER | Age: 69
LOS: 1 days | Discharge: HOME | End: 2019-04-22
Payer: MEDICARE

## 2019-04-21 VITALS — DIASTOLIC BLOOD PRESSURE: 65 MMHG | SYSTOLIC BLOOD PRESSURE: 118 MMHG

## 2019-04-21 VITALS — WEIGHT: 246.92 LBS | HEIGHT: 74 IN | BODY MASS INDEX: 31.69 KG/M2

## 2019-04-21 DIAGNOSIS — K21.9: ICD-10-CM

## 2019-04-21 DIAGNOSIS — R06.02: Primary | ICD-10-CM

## 2019-04-21 DIAGNOSIS — Z79.899: ICD-10-CM

## 2019-04-21 DIAGNOSIS — I11.0: ICD-10-CM

## 2019-04-21 DIAGNOSIS — Z98.890: ICD-10-CM

## 2019-04-21 DIAGNOSIS — J44.9: ICD-10-CM

## 2019-04-21 DIAGNOSIS — I50.9: ICD-10-CM

## 2019-04-21 DIAGNOSIS — I48.91: ICD-10-CM

## 2019-04-21 LAB
ALBUMIN SERPL BCP-MCNC: 3.7 G/DL (ref 3.4–5)
ALBUMIN/GLOB SERPL: 1.2 {RATIO} (ref 1.1–1.5)
ALP SERPL-CCNC: 93 IU/L (ref 46–116)
ALT SERPL W P-5'-P-CCNC: 21 U/L (ref 12–78)
ANION GAP SERPL CALCULATED.3IONS-SCNC: 6 MMOL/L (ref 8–16)
ANISOCYTOSIS BLD QL SMEAR: (no result)
APTT PPP: 28 SECONDS (ref 22–32)
AST SERPL W P-5'-P-CCNC: 15 U/L (ref 10–37)
BASOPHILS # BLD AUTO: 0.1 X10'3 (ref 0–0.2)
BASOPHILS NFR BLD AUTO: 0.5 % (ref 0–1)
BILIRUB SERPL-MCNC: 0.4 MG/DL (ref 0.1–1)
BUN SERPL-MCNC: 19 MG/DL (ref 7–18)
BUN/CREAT SERPL: 12.5 (ref 5.4–32)
CALCIUM SERPL-MCNC: 8.7 MG/DL (ref 8.5–10.1)
CHLORIDE SERPL-SCNC: 96 MMOL/L (ref 99–107)
CO2 SERPL-SCNC: 31.1 MMOL/L (ref 24–32)
CREAT SERPL-MCNC: 1.52 MG/DL (ref 0.6–1.1)
EOSINOPHIL # BLD AUTO: 0.2 X10'3 (ref 0–0.9)
EOSINOPHIL NFR BLD AUTO: 0.8 % (ref 0–6)
EOSINOPHIL NFR BLD MANUAL: 1 % (ref 0–6)
ERYTHROCYTE [DISTWIDTH] IN BLOOD BY AUTOMATED COUNT: 18.6 % (ref 11.5–14.5)
GFR SERPL CREATININE-BSD FRML MDRD: 46 ML/MIN
GLUCOSE SERPL-MCNC: 147 MG/DL (ref 70–104)
HCT VFR BLD AUTO: 29.8 % (ref 42–52)
HGB BLD-MCNC: 9.1 G/DL (ref 14–17.9)
INR PPP: 1 INR
LYMPHOCYTES # BLD AUTO: 18.1 X10'3 (ref 1.1–4.8)
LYMPHOCYTES NFR BLD AUTO: 75.3 % (ref 21–51)
LYMPHOCYTES NFR BLD MANUAL: 71 % (ref 21–51)
MCH RBC QN AUTO: 21.9 PG (ref 27–31)
MCHC RBC AUTO-ENTMCNC: 30.6 G/DL (ref 33–36.5)
MCV RBC AUTO: 71.5 FL (ref 78–98)
MICROCYTES BLD QL SMEAR: (no result)
MONOCYTES # BLD AUTO: 0.7 X10'3 (ref 0–0.9)
MONOCYTES NFR BLD AUTO: 2.8 % (ref 2–12)
MONOCYTES NFR BLD MANUAL: 2 % (ref 2–12)
NEUTROPHILS # BLD AUTO: 5 X10'3 (ref 1.8–7.7)
NEUTROPHILS NFR BLD AUTO: 20.6 % (ref 42–75)
NEUTS SEG NFR BLD MANUAL: 26 % (ref 42–75)
OVALOCYTES BLD QL SMEAR: (no result)
PLATELET # BLD AUTO: 130 X10'3 (ref 140–440)
PLATELET BLD QL SMEAR: (no result)
PMV BLD AUTO: 7.1 FL (ref 7.4–10.4)
POTASSIUM SERPL-SCNC: 3.7 MMOL/L (ref 3.5–5.1)
PROT SERPL-MCNC: 6.7 G/DL (ref 6.4–8.2)
RBC # BLD AUTO: 4.17 X10'6 (ref 4.7–6.1)
RBC MORPH BLD: (no result)
SMUDGE CELLS BLD QL SMEAR: (no result)
SODIUM SERPL-SCNC: 133 MMOL/L (ref 135–145)
TOTAL CELLS COUNTED FLD: 100
WBC # BLD AUTO: 24.1 X10'3 (ref 4.5–11)

## 2019-04-21 PROCEDURE — 85025 COMPLETE CBC W/AUTO DIFF WBC: CPT

## 2019-04-21 PROCEDURE — 83880 ASSAY OF NATRIURETIC PEPTIDE: CPT

## 2019-04-21 PROCEDURE — 36415 COLL VENOUS BLD VENIPUNCTURE: CPT

## 2019-04-21 PROCEDURE — 80053 COMPREHEN METABOLIC PANEL: CPT

## 2019-04-21 PROCEDURE — 87040 BLOOD CULTURE FOR BACTERIA: CPT

## 2019-04-21 PROCEDURE — 85730 THROMBOPLASTIN TIME PARTIAL: CPT

## 2019-04-21 PROCEDURE — 93005 ELECTROCARDIOGRAM TRACING: CPT

## 2019-04-21 PROCEDURE — 84484 ASSAY OF TROPONIN QUANT: CPT

## 2019-04-21 PROCEDURE — 99284 EMERGENCY DEPT VISIT MOD MDM: CPT

## 2019-04-21 PROCEDURE — 71045 X-RAY EXAM CHEST 1 VIEW: CPT

## 2019-04-21 PROCEDURE — 71250 CT THORAX DX C-: CPT

## 2019-04-21 PROCEDURE — 85610 PROTHROMBIN TIME: CPT

## 2019-04-21 PROCEDURE — 83605 ASSAY OF LACTIC ACID: CPT

## 2019-07-03 ENCOUNTER — HOSPITAL ENCOUNTER (INPATIENT)
Dept: HOSPITAL 94 - ER | Age: 69
LOS: 7 days | Discharge: HOME | DRG: 840 | End: 2019-07-10
Attending: INTERNAL MEDICINE | Admitting: INTERNAL MEDICINE
Payer: MEDICARE

## 2019-07-03 VITALS — DIASTOLIC BLOOD PRESSURE: 61 MMHG | SYSTOLIC BLOOD PRESSURE: 99 MMHG

## 2019-07-03 VITALS — BODY MASS INDEX: 34.15 KG/M2 | HEIGHT: 74 IN | WEIGHT: 266.1 LBS

## 2019-07-03 VITALS — DIASTOLIC BLOOD PRESSURE: 50 MMHG | SYSTOLIC BLOOD PRESSURE: 98 MMHG

## 2019-07-03 VITALS — SYSTOLIC BLOOD PRESSURE: 111 MMHG | DIASTOLIC BLOOD PRESSURE: 64 MMHG

## 2019-07-03 VITALS — DIASTOLIC BLOOD PRESSURE: 57 MMHG | SYSTOLIC BLOOD PRESSURE: 104 MMHG

## 2019-07-03 DIAGNOSIS — Z90.49: ICD-10-CM

## 2019-07-03 DIAGNOSIS — Z79.01: ICD-10-CM

## 2019-07-03 DIAGNOSIS — R16.1: ICD-10-CM

## 2019-07-03 DIAGNOSIS — Z87.891: ICD-10-CM

## 2019-07-03 DIAGNOSIS — R59.0: ICD-10-CM

## 2019-07-03 DIAGNOSIS — E87.6: ICD-10-CM

## 2019-07-03 DIAGNOSIS — C85.95: Primary | ICD-10-CM

## 2019-07-03 DIAGNOSIS — Z99.81: ICD-10-CM

## 2019-07-03 DIAGNOSIS — I13.0: ICD-10-CM

## 2019-07-03 DIAGNOSIS — I43: ICD-10-CM

## 2019-07-03 DIAGNOSIS — D64.9: ICD-10-CM

## 2019-07-03 DIAGNOSIS — I08.1: ICD-10-CM

## 2019-07-03 DIAGNOSIS — N18.9: ICD-10-CM

## 2019-07-03 DIAGNOSIS — K21.9: ICD-10-CM

## 2019-07-03 DIAGNOSIS — Z85.6: ICD-10-CM

## 2019-07-03 DIAGNOSIS — K57.30: ICD-10-CM

## 2019-07-03 DIAGNOSIS — I47.2: ICD-10-CM

## 2019-07-03 DIAGNOSIS — Z79.899: ICD-10-CM

## 2019-07-03 DIAGNOSIS — I48.0: ICD-10-CM

## 2019-07-03 DIAGNOSIS — I50.23: ICD-10-CM

## 2019-07-03 DIAGNOSIS — N28.1: ICD-10-CM

## 2019-07-03 DIAGNOSIS — R91.8: ICD-10-CM

## 2019-07-03 DIAGNOSIS — I27.20: ICD-10-CM

## 2019-07-03 DIAGNOSIS — J96.10: ICD-10-CM

## 2019-07-03 DIAGNOSIS — J44.9: ICD-10-CM

## 2019-07-03 DIAGNOSIS — I25.10: ICD-10-CM

## 2019-07-03 DIAGNOSIS — N17.9: ICD-10-CM

## 2019-07-03 DIAGNOSIS — Z92.21: ICD-10-CM

## 2019-07-03 DIAGNOSIS — M19.90: ICD-10-CM

## 2019-07-03 DIAGNOSIS — R31.9: ICD-10-CM

## 2019-07-03 LAB
ALBUMIN SERPL BCP-MCNC: 3.6 G/DL (ref 3.4–5)
ALBUMIN/GLOB SERPL: 1.2 {RATIO} (ref 1.1–1.5)
ALP SERPL-CCNC: 98 IU/L (ref 46–116)
ALT SERPL W P-5'-P-CCNC: 24 U/L (ref 12–78)
ANION GAP SERPL CALCULATED.3IONS-SCNC: 3 MMOL/L (ref 8–16)
ANISOCYTOSIS BLD QL SMEAR: (no result)
AST SERPL W P-5'-P-CCNC: 24 U/L (ref 10–37)
BASOPHILS # BLD AUTO: (no result) X10'3 (ref 0–0.2)
BASOPHILS NFR BLD AUTO: (no result) % (ref 0–1)
BILIRUB SERPL-MCNC: 0.6 MG/DL (ref 0.1–1)
BUN SERPL-MCNC: 22 MG/DL (ref 7–18)
BUN/CREAT SERPL: 11.1 (ref 5.4–32)
CALCIUM SERPL-MCNC: 8.9 MG/DL (ref 8.5–10.1)
CHLORIDE SERPL-SCNC: 99 MMOL/L (ref 99–107)
CO2 SERPL-SCNC: 32.9 MMOL/L (ref 24–32)
CREAT SERPL-MCNC: 1.98 MG/DL (ref 0.6–1.1)
EOSINOPHIL # BLD AUTO: (no result) X10'3 (ref 0–0.9)
EOSINOPHIL NFR BLD AUTO: (no result) % (ref 0–6)
ERYTHROCYTE [DISTWIDTH] IN BLOOD BY AUTOMATED COUNT: 18.5 % (ref 11.5–14.5)
GFR SERPL CREATININE-BSD FRML MDRD: 34 ML/MIN
GLUCOSE SERPL-MCNC: 117 MG/DL (ref 70–104)
HCT VFR BLD AUTO: 32 % (ref 42–52)
HGB BLD-MCNC: 9.5 G/DL (ref 14–17.9)
HYPOCHROMIA BLD QL SMEAR: (no result)
LYMPHOCYTES # BLD AUTO: (no result) X10'3 (ref 1.1–4.8)
LYMPHOCYTES NFR BLD AUTO: (no result) % (ref 21–51)
LYMPHOCYTES NFR BLD MANUAL: 90 % (ref 21–51)
MAGNESIUM SERPL-MCNC: 2 MG/DL (ref 1.5–2.4)
MCH RBC QN AUTO: 21.5 PG (ref 27–31)
MCHC RBC AUTO-ENTMCNC: 29.7 G/DL (ref 33–36.5)
MCV RBC AUTO: 72.4 FL (ref 78–98)
MICROCYTES BLD QL SMEAR: (no result)
MONOCYTES # BLD AUTO: (no result) X10'3 (ref 0–0.9)
MONOCYTES NFR BLD AUTO: (no result) % (ref 2–12)
MONOCYTES NFR BLD MANUAL: 1 % (ref 2–12)
NEUTROPHILS # BLD AUTO: (no result) X10'3 (ref 1.8–7.7)
NEUTROPHILS NFR BLD AUTO: (no result) % (ref 42–75)
NEUTS SEG NFR BLD MANUAL: 9 % (ref 42–75)
OVALOCYTES BLD QL SMEAR: (no result)
PLATELET # BLD AUTO: 166 X10'3 (ref 140–440)
PLATELET BLD QL SMEAR: NORMAL
PMV BLD AUTO: 6.8 FL (ref 7.4–10.4)
POLYCHROMASIA BLD QL SMEAR: (no result)
POTASSIUM SERPL-SCNC: 3.8 MMOL/L (ref 3.5–5.1)
POTASSIUM SERPL-SCNC: 3.9 MMOL/L (ref 3.5–5.1)
PROT SERPL-MCNC: 6.6 G/DL (ref 6.4–8.2)
RBC # BLD AUTO: 4.42 X10'6 (ref 4.7–6.1)
RBC MORPH BLD: (no result)
SMUDGE CELLS BLD QL SMEAR: (no result)
SODIUM SERPL-SCNC: 135 MMOL/L (ref 135–145)
TOTAL CELLS COUNTED FLD: 100
WBC # BLD AUTO: 66.2 X10'3 (ref 4.5–11)

## 2019-07-03 PROCEDURE — 80048 BASIC METABOLIC PNL TOTAL CA: CPT

## 2019-07-03 PROCEDURE — 84100 ASSAY OF PHOSPHORUS: CPT

## 2019-07-03 PROCEDURE — 85610 PROTHROMBIN TIME: CPT

## 2019-07-03 PROCEDURE — 93306 TTE W/DOPPLER COMPLETE: CPT

## 2019-07-03 PROCEDURE — 87081 CULTURE SCREEN ONLY: CPT

## 2019-07-03 PROCEDURE — 83605 ASSAY OF LACTIC ACID: CPT

## 2019-07-03 PROCEDURE — 93005 ELECTROCARDIOGRAM TRACING: CPT

## 2019-07-03 PROCEDURE — 74176 CT ABD & PELVIS W/O CONTRAST: CPT

## 2019-07-03 PROCEDURE — 94760 N-INVAS EAR/PLS OXIMETRY 1: CPT

## 2019-07-03 PROCEDURE — 83880 ASSAY OF NATRIURETIC PEPTIDE: CPT

## 2019-07-03 PROCEDURE — 85025 COMPLETE CBC W/AUTO DIFF WBC: CPT

## 2019-07-03 PROCEDURE — 97161 PT EVAL LOW COMPLEX 20 MIN: CPT

## 2019-07-03 PROCEDURE — 83735 ASSAY OF MAGNESIUM: CPT

## 2019-07-03 PROCEDURE — 97116 GAIT TRAINING THERAPY: CPT

## 2019-07-03 PROCEDURE — 36415 COLL VENOUS BLD VENIPUNCTURE: CPT

## 2019-07-03 PROCEDURE — 71045 X-RAY EXAM CHEST 1 VIEW: CPT

## 2019-07-03 PROCEDURE — 80076 HEPATIC FUNCTION PANEL: CPT

## 2019-07-03 PROCEDURE — 84132 ASSAY OF SERUM POTASSIUM: CPT

## 2019-07-03 PROCEDURE — 99285 EMERGENCY DEPT VISIT HI MDM: CPT

## 2019-07-03 PROCEDURE — 96374 THER/PROPH/DIAG INJ IV PUSH: CPT

## 2019-07-03 PROCEDURE — 84484 ASSAY OF TROPONIN QUANT: CPT

## 2019-07-03 PROCEDURE — 80053 COMPREHEN METABOLIC PANEL: CPT

## 2019-07-03 PROCEDURE — 94640 AIRWAY INHALATION TREATMENT: CPT

## 2019-07-03 RX ADMIN — ALBUTEROL SULFATE SCH MG: 2.5 SOLUTION RESPIRATORY (INHALATION) at 20:50

## 2019-07-03 RX ADMIN — BUDESONIDE SCH MG: 0.5 INHALANT RESPIRATORY (INHALATION) at 20:49

## 2019-07-03 NOTE — NUR
Problems reprioritized. Patient report given, questions answered & plan of care reviewed 
with Cassandra SWIFT.

## 2019-07-03 NOTE — NUR
Patient in room INGRID 346. I have received report from JENIFFER Escalera  and had the opportunity to 
ask questions and assume patient care.

-------------------------------------------------------------------------------

Addendum: 07/04/19 at 0102 by Mari Gonzalez RN

-------------------------------------------------------------------------------

Amended: Links added.

## 2019-07-04 VITALS — DIASTOLIC BLOOD PRESSURE: 78 MMHG | SYSTOLIC BLOOD PRESSURE: 93 MMHG

## 2019-07-04 VITALS — SYSTOLIC BLOOD PRESSURE: 94 MMHG | DIASTOLIC BLOOD PRESSURE: 51 MMHG

## 2019-07-04 VITALS — SYSTOLIC BLOOD PRESSURE: 106 MMHG | DIASTOLIC BLOOD PRESSURE: 63 MMHG

## 2019-07-04 VITALS — DIASTOLIC BLOOD PRESSURE: 57 MMHG | SYSTOLIC BLOOD PRESSURE: 104 MMHG

## 2019-07-04 VITALS — SYSTOLIC BLOOD PRESSURE: 115 MMHG | DIASTOLIC BLOOD PRESSURE: 61 MMHG

## 2019-07-04 VITALS — SYSTOLIC BLOOD PRESSURE: 111 MMHG | DIASTOLIC BLOOD PRESSURE: 64 MMHG

## 2019-07-04 VITALS — DIASTOLIC BLOOD PRESSURE: 49 MMHG | SYSTOLIC BLOOD PRESSURE: 123 MMHG

## 2019-07-04 VITALS — DIASTOLIC BLOOD PRESSURE: 60 MMHG | SYSTOLIC BLOOD PRESSURE: 90 MMHG

## 2019-07-04 VITALS — SYSTOLIC BLOOD PRESSURE: 123 MMHG | DIASTOLIC BLOOD PRESSURE: 49 MMHG

## 2019-07-04 VITALS — DIASTOLIC BLOOD PRESSURE: 63 MMHG | SYSTOLIC BLOOD PRESSURE: 106 MMHG

## 2019-07-04 VITALS — DIASTOLIC BLOOD PRESSURE: 44 MMHG | SYSTOLIC BLOOD PRESSURE: 108 MMHG

## 2019-07-04 VITALS — SYSTOLIC BLOOD PRESSURE: 99 MMHG | DIASTOLIC BLOOD PRESSURE: 61 MMHG

## 2019-07-04 VITALS — SYSTOLIC BLOOD PRESSURE: 92 MMHG | DIASTOLIC BLOOD PRESSURE: 46 MMHG

## 2019-07-04 VITALS — SYSTOLIC BLOOD PRESSURE: 94 MMHG | DIASTOLIC BLOOD PRESSURE: 42 MMHG

## 2019-07-04 VITALS — DIASTOLIC BLOOD PRESSURE: 56 MMHG | SYSTOLIC BLOOD PRESSURE: 107 MMHG

## 2019-07-04 VITALS — SYSTOLIC BLOOD PRESSURE: 92 MMHG | DIASTOLIC BLOOD PRESSURE: 44 MMHG

## 2019-07-04 VITALS — DIASTOLIC BLOOD PRESSURE: 60 MMHG | SYSTOLIC BLOOD PRESSURE: 106 MMHG

## 2019-07-04 VITALS — DIASTOLIC BLOOD PRESSURE: 104 MMHG | SYSTOLIC BLOOD PRESSURE: 129 MMHG

## 2019-07-04 VITALS — SYSTOLIC BLOOD PRESSURE: 111 MMHG | DIASTOLIC BLOOD PRESSURE: 49 MMHG

## 2019-07-04 VITALS — SYSTOLIC BLOOD PRESSURE: 107 MMHG | DIASTOLIC BLOOD PRESSURE: 67 MMHG

## 2019-07-04 LAB
ALBUMIN SERPL BCP-MCNC: 3.4 G/DL (ref 3.4–5)
ANION GAP SERPL CALCULATED.3IONS-SCNC: 0 MMOL/L (ref 8–16)
ANISOCYTOSIS BLD QL SMEAR: (no result)
BASOPHILS # BLD AUTO: (no result) X10'3 (ref 0–0.2)
BASOPHILS NFR BLD AUTO: (no result) % (ref 0–1)
BUN SERPL-MCNC: 23 MG/DL (ref 7–18)
BUN/CREAT SERPL: 12.2 (ref 5.4–32)
CALCIUM SERPL-MCNC: 8.6 MG/DL (ref 8.5–10.1)
CHLORIDE SERPL-SCNC: 99 MMOL/L (ref 99–107)
CO2 SERPL-SCNC: 34.6 MMOL/L (ref 24–32)
CREAT SERPL-MCNC: 1.89 MG/DL (ref 0.6–1.1)
EOSINOPHIL # BLD AUTO: (no result) X10'3 (ref 0–0.9)
EOSINOPHIL NFR BLD AUTO: (no result) % (ref 0–6)
EOSINOPHIL NFR BLD MANUAL: 1 % (ref 0–6)
ERYTHROCYTE [DISTWIDTH] IN BLOOD BY AUTOMATED COUNT: 18.7 % (ref 11.5–14.5)
GFR SERPL CREATININE-BSD FRML MDRD: 36 ML/MIN
GLUCOSE SERPL-MCNC: 89 MG/DL (ref 70–104)
HCT VFR BLD AUTO: 30 % (ref 42–52)
HGB BLD-MCNC: 8.8 G/DL (ref 14–17.9)
HYPOCHROMIA BLD QL SMEAR: (no result)
LYMPHOCYTES # BLD AUTO: (no result) X10'3 (ref 1.1–4.8)
LYMPHOCYTES NFR BLD AUTO: (no result) % (ref 21–51)
LYMPHOCYTES NFR BLD MANUAL: 84 % (ref 21–51)
MAGNESIUM SERPL-MCNC: 2 MG/DL (ref 1.5–2.4)
MCH RBC QN AUTO: 21.5 PG (ref 27–31)
MCHC RBC AUTO-ENTMCNC: 29.4 G/DL (ref 33–36.5)
MCV RBC AUTO: 73 FL (ref 78–98)
MICROCYTES BLD QL SMEAR: (no result)
MONOCYTES # BLD AUTO: (no result) X10'3 (ref 0–0.9)
MONOCYTES NFR BLD AUTO: (no result) % (ref 2–12)
MONOCYTES NFR BLD MANUAL: 3 % (ref 2–12)
NEUTROPHILS # BLD AUTO: (no result) X10'3 (ref 1.8–7.7)
NEUTROPHILS NFR BLD AUTO: (no result) % (ref 42–75)
NEUTS SEG NFR BLD MANUAL: 8 % (ref 42–75)
OVALOCYTES BLD QL SMEAR: (no result)
PLATELET # BLD AUTO: 133 X10'3 (ref 140–440)
PLATELET BLD QL SMEAR: (no result)
PMV BLD AUTO: 7.3 FL (ref 7.4–10.4)
POLYCHROMASIA BLD QL SMEAR: (no result)
POTASSIUM SERPL-SCNC: 3.5 MMOL/L (ref 3.5–5.1)
RBC # BLD AUTO: 4.11 X10'6 (ref 4.7–6.1)
RBC MORPH BLD: (no result)
SMUDGE CELLS BLD QL SMEAR: (no result)
SODIUM SERPL-SCNC: 134 MMOL/L (ref 135–145)
STOMATOCYTES BLD QL SMEAR: (no result)
TOTAL CELLS COUNTED FLD: 100
VARIANT LYMPHS NFR BLD MANUAL: 4 % (ref 0–0)
WBC # BLD AUTO: 54.4 X10'3 (ref 4.5–11)

## 2019-07-04 RX ADMIN — ALBUTEROL SULFATE SCH MG: 2.5 SOLUTION RESPIRATORY (INHALATION) at 11:21

## 2019-07-04 RX ADMIN — BUDESONIDE SCH MG: 0.5 INHALANT RESPIRATORY (INHALATION) at 19:11

## 2019-07-04 RX ADMIN — HYDROCODONE BITARTRATE AND ACETAMINOPHEN PRN TAB: 5; 325 TABLET ORAL at 19:38

## 2019-07-04 RX ADMIN — BUDESONIDE SCH MG: 0.5 INHALANT RESPIRATORY (INHALATION) at 09:00

## 2019-07-04 RX ADMIN — POTASSIUM CHLORIDE PRN MEQ: 1500 TABLET, EXTENDED RELEASE ORAL at 10:44

## 2019-07-04 RX ADMIN — HYDROCODONE BITARTRATE AND ACETAMINOPHEN PRN TAB: 5; 325 TABLET ORAL at 03:40

## 2019-07-04 RX ADMIN — POTASSIUM CHLORIDE PRN MEQ: 1500 TABLET, EXTENDED RELEASE ORAL at 19:36

## 2019-07-04 RX ADMIN — HYDROCODONE BITARTRATE AND ACETAMINOPHEN PRN TAB: 5; 325 TABLET ORAL at 12:06

## 2019-07-04 RX ADMIN — DOBUTAMINE IN DEXTROSE SCH MLS/HR: 200 INJECTION, SOLUTION INTRAVENOUS at 12:10

## 2019-07-04 RX ADMIN — ALBUTEROL SULFATE SCH MG: 2.5 SOLUTION RESPIRATORY (INHALATION) at 19:11

## 2019-07-04 RX ADMIN — ONDANSETRON PRN MG: 2 INJECTION, SOLUTION INTRAMUSCULAR; INTRAVENOUS at 19:36

## 2019-07-04 RX ADMIN — POTASSIUM CHLORIDE PRN MEQ: 1500 TABLET, EXTENDED RELEASE ORAL at 14:53

## 2019-07-04 RX ADMIN — ALBUTEROL SULFATE SCH MG: 2.5 SOLUTION RESPIRATORY (INHALATION) at 15:12

## 2019-07-04 NOTE — NUR
Patient in room PCU 3013. I have received report from  Steven SWIFT and had the opportunity to 
ask questions and assume patient care.

## 2019-07-04 NOTE — NUR
Per Dr. Gonzalez, patient's K needs to be above 4.0. Orders received to replace pt per 
protocol if K less than 4.0. Pt's potassium 3.5 this am. 20 MeQ given per MD orders.

## 2019-07-04 NOTE — NUR
Patient in room Surgical 352-A. I have received report from  JENFIFER Trujillo  and had the 
opportunity to ask questions and assume patient care.

## 2019-07-04 NOTE — NUR
Problems reprioritized. Patient report given, questions answered & plan of care reviewed 
with Michelle SWIFT.

## 2019-07-04 NOTE — NUR
Problems reprioritized. Patient report given, questions answered & plan of care reviewed 
with JENIFFER LACKEY. 

-------------------------------------------------------------------------------

Addendum: 07/04/19 at 0650 by Mari Gonzalez RN

-------------------------------------------------------------------------------

Amended: Links added.

## 2019-07-05 VITALS — DIASTOLIC BLOOD PRESSURE: 53 MMHG | SYSTOLIC BLOOD PRESSURE: 103 MMHG

## 2019-07-05 VITALS — DIASTOLIC BLOOD PRESSURE: 64 MMHG | SYSTOLIC BLOOD PRESSURE: 112 MMHG

## 2019-07-05 VITALS — SYSTOLIC BLOOD PRESSURE: 95 MMHG | DIASTOLIC BLOOD PRESSURE: 56 MMHG

## 2019-07-05 VITALS — SYSTOLIC BLOOD PRESSURE: 112 MMHG | DIASTOLIC BLOOD PRESSURE: 79 MMHG

## 2019-07-05 VITALS — SYSTOLIC BLOOD PRESSURE: 103 MMHG | DIASTOLIC BLOOD PRESSURE: 54 MMHG

## 2019-07-05 VITALS — SYSTOLIC BLOOD PRESSURE: 103 MMHG | DIASTOLIC BLOOD PRESSURE: 53 MMHG

## 2019-07-05 VITALS — DIASTOLIC BLOOD PRESSURE: 46 MMHG | SYSTOLIC BLOOD PRESSURE: 117 MMHG

## 2019-07-05 VITALS — DIASTOLIC BLOOD PRESSURE: 69 MMHG | SYSTOLIC BLOOD PRESSURE: 111 MMHG

## 2019-07-05 VITALS — DIASTOLIC BLOOD PRESSURE: 61 MMHG | SYSTOLIC BLOOD PRESSURE: 99 MMHG

## 2019-07-05 VITALS — DIASTOLIC BLOOD PRESSURE: 61 MMHG | SYSTOLIC BLOOD PRESSURE: 119 MMHG

## 2019-07-05 VITALS — SYSTOLIC BLOOD PRESSURE: 92 MMHG | DIASTOLIC BLOOD PRESSURE: 56 MMHG

## 2019-07-05 VITALS — SYSTOLIC BLOOD PRESSURE: 112 MMHG | DIASTOLIC BLOOD PRESSURE: 64 MMHG

## 2019-07-05 VITALS — DIASTOLIC BLOOD PRESSURE: 53 MMHG | SYSTOLIC BLOOD PRESSURE: 105 MMHG

## 2019-07-05 VITALS — DIASTOLIC BLOOD PRESSURE: 79 MMHG | SYSTOLIC BLOOD PRESSURE: 112 MMHG

## 2019-07-05 VITALS — DIASTOLIC BLOOD PRESSURE: 67 MMHG | SYSTOLIC BLOOD PRESSURE: 106 MMHG

## 2019-07-05 VITALS — SYSTOLIC BLOOD PRESSURE: 75 MMHG

## 2019-07-05 LAB
ALBUMIN SERPL BCP-MCNC: 3.2 G/DL (ref 3.4–5)
ALBUMIN SERPL BCP-MCNC: 3.4 G/DL (ref 3.4–5)
ANION GAP SERPL CALCULATED.3IONS-SCNC: 0 MMOL/L (ref 8–16)
ANION GAP SERPL CALCULATED.3IONS-SCNC: 2 MMOL/L (ref 8–16)
ANISOCYTOSIS BLD QL SMEAR: (no result)
ANISOCYTOSIS BLD QL SMEAR: (no result)
BASOPHILS # BLD AUTO: 0 X10'3 (ref 0–0.2)
BASOPHILS # BLD AUTO: 0.1 X10'3 (ref 0–0.2)
BASOPHILS NFR BLD AUTO: 0 % (ref 0–1)
BASOPHILS NFR BLD AUTO: 0.2 % (ref 0–1)
BUN SERPL-MCNC: 17 MG/DL (ref 7–18)
BUN SERPL-MCNC: 18 MG/DL (ref 7–18)
BUN/CREAT SERPL: 10.5 (ref 5.4–32)
BUN/CREAT SERPL: 11.6 (ref 5.4–32)
CALCIUM SERPL-MCNC: 8.3 MG/DL (ref 8.5–10.1)
CALCIUM SERPL-MCNC: 8.6 MG/DL (ref 8.5–10.1)
CHLORIDE SERPL-SCNC: 103 MMOL/L (ref 99–107)
CHLORIDE SERPL-SCNC: 97 MMOL/L (ref 99–107)
CO2 SERPL-SCNC: 31.7 MMOL/L (ref 24–32)
CO2 SERPL-SCNC: 33.9 MMOL/L (ref 24–32)
CREAT SERPL-MCNC: 1.46 MG/DL (ref 0.6–1.1)
CREAT SERPL-MCNC: 1.71 MG/DL (ref 0.6–1.1)
EOSINOPHIL # BLD AUTO: 0.2 X10'3 (ref 0–0.9)
EOSINOPHIL # BLD AUTO: 0.3 X10'3 (ref 0–0.9)
EOSINOPHIL NFR BLD AUTO: 0.5 % (ref 0–6)
EOSINOPHIL NFR BLD AUTO: 0.5 % (ref 0–6)
EOSINOPHIL NFR BLD MANUAL: 1 % (ref 0–6)
ERYTHROCYTE [DISTWIDTH] IN BLOOD BY AUTOMATED COUNT: 19 % (ref 11.5–14.5)
ERYTHROCYTE [DISTWIDTH] IN BLOOD BY AUTOMATED COUNT: 19.1 % (ref 11.5–14.5)
GFR SERPL CREATININE-BSD FRML MDRD: 40 ML/MIN
GFR SERPL CREATININE-BSD FRML MDRD: 48 ML/MIN
GLUCOSE SERPL-MCNC: 114 MG/DL (ref 70–104)
GLUCOSE SERPL-MCNC: 82 MG/DL (ref 70–104)
HCT VFR BLD AUTO: 27.8 % (ref 42–52)
HCT VFR BLD AUTO: 31.3 % (ref 42–52)
HGB BLD-MCNC: 8.1 G/DL (ref 14–17.9)
HGB BLD-MCNC: 9 G/DL (ref 14–17.9)
HYPOCHROMIA BLD QL SMEAR: (no result)
HYPOCHROMIA BLD QL SMEAR: (no result)
LYMPHOCYTES # BLD AUTO: 32.9 X10'3 (ref 1.1–4.8)
LYMPHOCYTES # BLD AUTO: 48.9 X10'3 (ref 1.1–4.8)
LYMPHOCYTES NFR BLD AUTO: 85.7 % (ref 21–51)
LYMPHOCYTES NFR BLD AUTO: 86.8 % (ref 21–51)
LYMPHOCYTES NFR BLD MANUAL: 77 % (ref 21–51)
LYMPHOCYTES NFR BLD MANUAL: 84 % (ref 21–51)
MAGNESIUM SERPL-MCNC: 2 MG/DL (ref 1.5–2.4)
MAGNESIUM SERPL-MCNC: 2.5 MG/DL (ref 1.5–2.4)
MCH RBC QN AUTO: 21.1 PG (ref 27–31)
MCH RBC QN AUTO: 21.3 PG (ref 27–31)
MCHC RBC AUTO-ENTMCNC: 28.7 G/DL (ref 33–36.5)
MCHC RBC AUTO-ENTMCNC: 29.1 G/DL (ref 33–36.5)
MCV RBC AUTO: 73.4 FL (ref 78–98)
MCV RBC AUTO: 73.5 FL (ref 78–98)
MICROCYTES BLD QL SMEAR: (no result)
MICROCYTES BLD QL SMEAR: (no result)
MONOCYTES # BLD AUTO: 0.6 X10'3 (ref 0–0.9)
MONOCYTES # BLD AUTO: 1.1 X10'3 (ref 0–0.9)
MONOCYTES NFR BLD AUTO: 1.5 % (ref 2–12)
MONOCYTES NFR BLD AUTO: 2 % (ref 2–12)
MONOCYTES NFR BLD MANUAL: 3 % (ref 2–12)
MONOCYTES NFR BLD MANUAL: 3 % (ref 2–12)
NEUTROPHILS # BLD AUTO: 4.2 X10'3 (ref 1.8–7.7)
NEUTROPHILS # BLD AUTO: 6.7 X10'3 (ref 1.8–7.7)
NEUTROPHILS NFR BLD AUTO: 11 % (ref 42–75)
NEUTROPHILS NFR BLD AUTO: 11.8 % (ref 42–75)
NEUTS SEG NFR BLD MANUAL: 11 % (ref 42–75)
NEUTS SEG NFR BLD MANUAL: 20 % (ref 42–75)
OVALOCYTES BLD QL SMEAR: (no result)
OVALOCYTES BLD QL SMEAR: (no result)
PHOSPHATE SERPL-MCNC: 3.7 MG/DL (ref 2.3–4.5)
PLATELET # BLD AUTO: 110 X10'3 (ref 140–440)
PLATELET # BLD AUTO: 141 X10'3 (ref 140–440)
PLATELET BLD QL SMEAR: (no result)
PLATELET BLD QL SMEAR: NORMAL
PMV BLD AUTO: 7 FL (ref 7.4–10.4)
PMV BLD AUTO: 7.4 FL (ref 7.4–10.4)
POLYCHROMASIA BLD QL SMEAR: (no result)
POLYCHROMASIA BLD QL SMEAR: (no result)
POTASSIUM SERPL-SCNC: 3.9 MMOL/L (ref 3.5–5.1)
POTASSIUM SERPL-SCNC: 4.7 MMOL/L (ref 3.5–5.1)
RBC # BLD AUTO: 3.78 X10'6 (ref 4.7–6.1)
RBC # BLD AUTO: 4.26 X10'6 (ref 4.7–6.1)
RBC MORPH BLD: (no result)
RBC MORPH BLD: (no result)
SMUDGE CELLS BLD QL SMEAR: (no result)
SMUDGE CELLS BLD QL SMEAR: (no result)
SODIUM SERPL-SCNC: 131 MMOL/L (ref 135–145)
SODIUM SERPL-SCNC: 137 MMOL/L (ref 135–145)
STOMATOCYTES BLD QL SMEAR: (no result)
TOTAL CELLS COUNTED FLD: 100
TOTAL CELLS COUNTED FLD: 100
TROPONIN I SERPL-MCNC: < 0.04 NG/ML (ref 0–0.05)
VARIANT LYMPHS NFR BLD MANUAL: 1 % (ref 0–0)
WBC # BLD AUTO: 37.9 X10'3 (ref 4.5–11)
WBC # BLD AUTO: 57.1 X10'3 (ref 4.5–11)

## 2019-07-05 RX ADMIN — ALBUTEROL SULFATE SCH MG: 2.5 SOLUTION RESPIRATORY (INHALATION) at 18:56

## 2019-07-05 RX ADMIN — HYDROCODONE BITARTRATE AND ACETAMINOPHEN PRN TAB: 5; 325 TABLET ORAL at 17:25

## 2019-07-05 RX ADMIN — HYDROCODONE BITARTRATE AND ACETAMINOPHEN PRN TAB: 5; 325 TABLET ORAL at 04:25

## 2019-07-05 RX ADMIN — HYDROCODONE BITARTRATE AND ACETAMINOPHEN PRN TAB: 5; 325 TABLET ORAL at 21:07

## 2019-07-05 RX ADMIN — BUDESONIDE SCH MG: 0.5 INHALANT RESPIRATORY (INHALATION) at 18:56

## 2019-07-05 RX ADMIN — BUDESONIDE SCH MG: 0.5 INHALANT RESPIRATORY (INHALATION) at 07:10

## 2019-07-05 RX ADMIN — FLUTICASONE PROPIONATE PRN SPR: 50 SPRAY, METERED NASAL at 13:14

## 2019-07-05 RX ADMIN — ALBUTEROL SULFATE SCH MG: 2.5 SOLUTION RESPIRATORY (INHALATION) at 07:10

## 2019-07-05 RX ADMIN — POTASSIUM CHLORIDE PRN MEQ: 1500 TABLET, EXTENDED RELEASE ORAL at 08:49

## 2019-07-05 RX ADMIN — ONDANSETRON PRN MG: 2 INJECTION, SOLUTION INTRAMUSCULAR; INTRAVENOUS at 18:23

## 2019-07-05 RX ADMIN — ONDANSETRON PRN MG: 2 INJECTION, SOLUTION INTRAMUSCULAR; INTRAVENOUS at 04:24

## 2019-07-05 RX ADMIN — ONDANSETRON PRN MG: 2 INJECTION, SOLUTION INTRAMUSCULAR; INTRAVENOUS at 12:04

## 2019-07-05 RX ADMIN — ALBUTEROL SULFATE SCH MG: 2.5 SOLUTION RESPIRATORY (INHALATION) at 11:00

## 2019-07-05 RX ADMIN — DOBUTAMINE IN DEXTROSE SCH MLS/HR: 200 INJECTION, SOLUTION INTRAVENOUS at 09:53

## 2019-07-05 RX ADMIN — ALBUTEROL SULFATE SCH MG: 2.5 SOLUTION RESPIRATORY (INHALATION) at 15:01

## 2019-07-05 RX ADMIN — HYDROCODONE BITARTRATE AND ACETAMINOPHEN PRN TAB: 5; 325 TABLET ORAL at 12:04

## 2019-07-05 RX ADMIN — POTASSIUM CHLORIDE SCH MEQ: 1500 TABLET, EXTENDED RELEASE ORAL at 17:24

## 2019-07-05 NOTE — NUR
Problems reprioritized. Patient report given, questions answered & plan of care reviewed 
with Bro SWIFT.

## 2019-07-05 NOTE — NUR
per Dr. Bhardwaj orders; 

-Lasix 20mg IV BID

-Lasix 20mg IV once now

-Potassium 20meq BID PO

-Amiodarone 150mg IV Once

-Mag 2Gm IV once.



Orders entered.

## 2019-07-05 NOTE — NUR
Per Dr. Bhardwaj orders to keep potassium at 4.0 or higher.  Pt's morning Potassium 3.9, 
beginning replacement.

## 2019-07-05 NOTE — NUR
Patient in room PCU 3013. I have received report from Bro SWIFT  and had the opportunity to 
ask questions and assume patient care.

## 2019-07-05 NOTE — NUR
PAGER ID:  [phone] 

MESSAGE:  RE: Fermin Alex, Room: [facility]. At 0900 Pt had 12 beat run of v-tach. -[facility] #[id number]



Dr. Behl paged concerning Pts 12 beat run of V-tach

## 2019-07-06 VITALS — SYSTOLIC BLOOD PRESSURE: 142 MMHG | DIASTOLIC BLOOD PRESSURE: 89 MMHG

## 2019-07-06 VITALS — DIASTOLIC BLOOD PRESSURE: 58 MMHG | SYSTOLIC BLOOD PRESSURE: 109 MMHG

## 2019-07-06 VITALS — DIASTOLIC BLOOD PRESSURE: 57 MMHG | SYSTOLIC BLOOD PRESSURE: 109 MMHG

## 2019-07-06 VITALS — SYSTOLIC BLOOD PRESSURE: 93 MMHG | DIASTOLIC BLOOD PRESSURE: 56 MMHG

## 2019-07-06 VITALS — DIASTOLIC BLOOD PRESSURE: 79 MMHG | SYSTOLIC BLOOD PRESSURE: 142 MMHG

## 2019-07-06 VITALS — DIASTOLIC BLOOD PRESSURE: 61 MMHG | SYSTOLIC BLOOD PRESSURE: 101 MMHG

## 2019-07-06 VITALS — SYSTOLIC BLOOD PRESSURE: 94 MMHG | DIASTOLIC BLOOD PRESSURE: 60 MMHG

## 2019-07-06 VITALS — DIASTOLIC BLOOD PRESSURE: 71 MMHG | SYSTOLIC BLOOD PRESSURE: 125 MMHG

## 2019-07-06 VITALS — SYSTOLIC BLOOD PRESSURE: 100 MMHG | DIASTOLIC BLOOD PRESSURE: 38 MMHG

## 2019-07-06 VITALS — DIASTOLIC BLOOD PRESSURE: 72 MMHG | SYSTOLIC BLOOD PRESSURE: 146 MMHG

## 2019-07-06 VITALS — DIASTOLIC BLOOD PRESSURE: 38 MMHG | SYSTOLIC BLOOD PRESSURE: 100 MMHG

## 2019-07-06 VITALS — SYSTOLIC BLOOD PRESSURE: 138 MMHG | DIASTOLIC BLOOD PRESSURE: 76 MMHG

## 2019-07-06 VITALS — SYSTOLIC BLOOD PRESSURE: 90 MMHG | DIASTOLIC BLOOD PRESSURE: 74 MMHG

## 2019-07-06 LAB
ALBUMIN SERPL BCP-MCNC: 3.3 G/DL (ref 3.4–5)
ALBUMIN SERPL BCP-MCNC: 3.5 G/DL (ref 3.4–5)
ALBUMIN/GLOB SERPL: 1.2 {RATIO} (ref 1.1–1.5)
ALP SERPL-CCNC: 84 IU/L (ref 46–116)
ALT SERPL W P-5'-P-CCNC: 20 U/L (ref 12–78)
ANION GAP SERPL CALCULATED.3IONS-SCNC: 1 MMOL/L (ref 8–16)
ANISOCYTOSIS BLD QL SMEAR: (no result)
AST SERPL W P-5'-P-CCNC: 18 U/L (ref 10–37)
BASOPHILS # BLD AUTO: 0 X10'3 (ref 0–0.2)
BASOPHILS NFR BLD AUTO: 0.1 % (ref 0–1)
BILIRUB DIRECT SERPL-MCNC: 0.1 MG/DL (ref 0–0.3)
BILIRUB SERPL-MCNC: 0.5 MG/DL (ref 0.1–1)
BUN SERPL-MCNC: 18 MG/DL (ref 7–18)
BUN/CREAT SERPL: 10.1 (ref 5.4–32)
CALCIUM SERPL-MCNC: 8.9 MG/DL (ref 8.5–10.1)
CHLORIDE SERPL-SCNC: 98 MMOL/L (ref 99–107)
CO2 SERPL-SCNC: 33.7 MMOL/L (ref 24–32)
CREAT SERPL-MCNC: 1.79 MG/DL (ref 0.6–1.1)
EOSINOPHIL # BLD AUTO: 0.3 X10'3 (ref 0–0.9)
EOSINOPHIL NFR BLD AUTO: 0.5 % (ref 0–6)
ERYTHROCYTE [DISTWIDTH] IN BLOOD BY AUTOMATED COUNT: 18.7 % (ref 11.5–14.5)
GFR SERPL CREATININE-BSD FRML MDRD: 38 ML/MIN
GLUCOSE SERPL-MCNC: 84 MG/DL (ref 70–104)
HCT VFR BLD AUTO: 30.7 % (ref 42–52)
HGB BLD-MCNC: 8.9 G/DL (ref 14–17.9)
HYPOCHROMIA BLD QL SMEAR: (no result)
LYMPHOCYTES # BLD AUTO: 48.7 X10'3 (ref 1.1–4.8)
LYMPHOCYTES NFR BLD AUTO: 87.3 % (ref 21–51)
LYMPHOCYTES NFR BLD MANUAL: 94 % (ref 21–51)
MAGNESIUM SERPL-MCNC: 2.4 MG/DL (ref 1.5–2.4)
MCH RBC QN AUTO: 21.5 PG (ref 27–31)
MCHC RBC AUTO-ENTMCNC: 29 G/DL (ref 33–36.5)
MCV RBC AUTO: 74.1 FL (ref 78–98)
MICROCYTES BLD QL SMEAR: (no result)
MONOCYTES # BLD AUTO: 0.9 X10'3 (ref 0–0.9)
MONOCYTES NFR BLD AUTO: 1.7 % (ref 2–12)
MONOCYTES NFR BLD MANUAL: 1 % (ref 2–12)
NEUTROPHILS # BLD AUTO: 5.8 X10'3 (ref 1.8–7.7)
NEUTROPHILS NFR BLD AUTO: 10.4 % (ref 42–75)
NEUTS SEG NFR BLD MANUAL: 5 % (ref 42–75)
PLATELET # BLD AUTO: 133 X10'3 (ref 140–440)
PLATELET BLD QL SMEAR: (no result)
PMV BLD AUTO: 7.5 FL (ref 7.4–10.4)
POLYCHROMASIA BLD QL SMEAR: (no result)
POTASSIUM SERPL-SCNC: 4.9 MMOL/L (ref 3.5–5.1)
PROT SERPL-MCNC: 6.4 G/DL (ref 6.4–8.2)
RBC # BLD AUTO: 4.15 X10'6 (ref 4.7–6.1)
RBC MORPH BLD: (no result)
SMUDGE CELLS BLD QL SMEAR: (no result)
SODIUM SERPL-SCNC: 133 MMOL/L (ref 135–145)
STOMATOCYTES BLD QL SMEAR: (no result)
TOTAL CELLS COUNTED FLD: 100
WBC # BLD AUTO: 55.8 X10'3 (ref 4.5–11)

## 2019-07-06 RX ADMIN — DOBUTAMINE IN DEXTROSE SCH MLS/HR: 200 INJECTION, SOLUTION INTRAVENOUS at 10:35

## 2019-07-06 RX ADMIN — HYDROCODONE BITARTRATE AND ACETAMINOPHEN PRN TAB: 5; 325 TABLET ORAL at 03:02

## 2019-07-06 RX ADMIN — HYDROCODONE BITARTRATE AND ACETAMINOPHEN PRN TAB: 5; 325 TABLET ORAL at 11:52

## 2019-07-06 RX ADMIN — ALBUTEROL SULFATE SCH MG: 2.5 SOLUTION RESPIRATORY (INHALATION) at 15:23

## 2019-07-06 RX ADMIN — HYDROCODONE BITARTRATE AND ACETAMINOPHEN PRN TAB: 5; 325 TABLET ORAL at 20:22

## 2019-07-06 RX ADMIN — ONDANSETRON PRN MG: 2 INJECTION, SOLUTION INTRAMUSCULAR; INTRAVENOUS at 16:16

## 2019-07-06 RX ADMIN — FUROSEMIDE SCH MLS/HR: 10 INJECTION, SOLUTION INTRAMUSCULAR; INTRAVENOUS at 23:54

## 2019-07-06 RX ADMIN — POTASSIUM CHLORIDE SCH MEQ: 1500 TABLET, EXTENDED RELEASE ORAL at 09:18

## 2019-07-06 RX ADMIN — BUDESONIDE SCH MG: 0.5 INHALANT RESPIRATORY (INHALATION) at 19:29

## 2019-07-06 RX ADMIN — DOBUTAMINE IN DEXTROSE SCH MLS/HR: 200 INJECTION, SOLUTION INTRAVENOUS at 03:01

## 2019-07-06 RX ADMIN — BUDESONIDE SCH MG: 0.5 INHALANT RESPIRATORY (INHALATION) at 07:30

## 2019-07-06 RX ADMIN — ALBUTEROL SULFATE SCH MG: 2.5 SOLUTION RESPIRATORY (INHALATION) at 10:44

## 2019-07-06 RX ADMIN — ALBUTEROL SULFATE SCH MG: 2.5 SOLUTION RESPIRATORY (INHALATION) at 07:30

## 2019-07-06 RX ADMIN — POTASSIUM CHLORIDE SCH MEQ: 1500 TABLET, EXTENDED RELEASE ORAL at 17:25

## 2019-07-06 RX ADMIN — ALBUTEROL SULFATE SCH MG: 2.5 SOLUTION RESPIRATORY (INHALATION) at 19:29

## 2019-07-06 RX ADMIN — FUROSEMIDE SCH MLS/HR: 10 INJECTION, SOLUTION INTRAMUSCULAR; INTRAVENOUS at 12:31

## 2019-07-06 RX ADMIN — HYDROCODONE BITARTRATE AND ACETAMINOPHEN PRN TAB: 5; 325 TABLET ORAL at 16:16

## 2019-07-06 NOTE — NUR
Emre Lisa  1961  57 y.o. male     Patient presents today for post op recheck of his left foot.     05/02/18  Chief Complaint   Patient presents with   • Left Foot - Post-op Follow-up           History of Present Illness    Patient presents to clinic today for follow-up.  He denies pain.  He is currently ambulating in a surgical shoe. His PICC line has been removed and he is taking oral antibiotics.    Past Medical History:   Diagnosis Date   • Arthritis    • Atrial fibrillation    • Diabetes mellitus    • Diabetic foot ulcer associated with type 2 diabetes mellitus     left great toe   • Hallux malleus of left foot    • Hammer toe    • Hypertension    • MI (myocardial infarction)    • Neuropathy in diabetes    • Onychomycosis          Past Surgical History:   Procedure Laterality Date   • AMPUTATION DIGIT Right 3/5/2017    Procedure: RIGHT AMPUTATION TRANSMETATRASAL , RECESSION GASTROCNEMOUS;  Surgeon: Marco A Thompson DPM;  Location: [facility];  Service:    • CARDIAC DEFIBRILLATOR PLACEMENT  2010, 2016   • CARPAL TUNNEL RELEASE  2015    elbow and wrist left arm   • FOOT IRRIGATION, DEBRIDEMENT AND REPAIR Left 3/7/2018    Procedure: FOOT IRRIGATION, DEBRIDEMENT AND REPAIR;  Surgeon: Marco A Thompson DPM;  Location: [facility] OR;  Service:    • FOOT IRRIGATION, DEBRIDEMENT AND REPAIR Left 3/10/2018    Procedure: FOOT IRRIGATION, DEBRIDEMENT AND REPAIR;  Surgeon: Marco A Thompson DPM;  Location: [facility] OR;  Service: Podiatry   • FOOT SURGERY     • FOOT WOUND CLOSURE Right 3/2/2017    Procedure: INCISION AND DRAINAGE, RIGHT FOOT;  Surgeon: Tung Rosenthal DPM;  Location: [facility] OR;  Service:    • HAMMER TOE REPAIR Left 2/15/2018    Procedure: HAMMERTOE CORRECTION LEFT SECOND, THIRD AND FOURTH TOES AND HALLUX INTERPHALANGEAL JOINT ARTHRODESIS LEFT FOOT (Micro Asnis, 4.0 & 5.0 Asnis)      (c-arm);  Surgeon: Marco A Thompson DPM;  Location: [facility] OR;  Service:    • HARDWARE REMOVAL Left 3/5/2018    Procedure:  Problems reprioritized. Patient report given, questions answered & plan of care reviewed 
with Bro SWIFT. ANKLE/FOOT HARDWARE REMOVAL;  Surgeon: Marco A Thompson DPM;  Location: [facility];  Service:    • INCISION AND DRAINAGE LEG Left 3/5/2018    Procedure: INCISION AND DRAINAGE LOWER EXTREMITY;  Surgeon: Marco A Thompson DPM;  Location: [facility];  Service:    • TOE AMPUTATION Right 2016   • TONSILECTOMY, ADENOIDECTOMY, BILATERAL MYRINGOTOMY AND TUBES      age 24         Family History   Problem Relation Age of Onset   • Diabetes Father    • Stroke Father    • No Known Problems Maternal Grandmother    • No Known Problems Maternal Grandfather    • No Known Problems Paternal Grandmother    • No Known Problems Paternal Grandfather    • No Known Problems Son    • No Known Problems Daughter    • No Known Problems Daughter          Social History     Social History   • Marital status:      Spouse name: N/A   • Number of children: N/A   • Years of education: N/A     Occupational History   • Not on file.     Social History Main Topics   • Smoking status: Never Smoker   • Smokeless tobacco: Never Used   • Alcohol use No   • Drug use: No   • Sexual activity: Defer     Other Topics Concern   • Not on file     Social History Narrative   • No narrative on file         Current Outpatient Prescriptions   Medication Sig Dispense Refill   • aspirin 81 MG chewable tablet Chew 81 mg Every Night.     • Bacitracin Zinc (MAGIC BUTT OINTMENT) Apply 1 each topically 2 (Two) Times a Day. 120 g 1   • Cholecalciferol (VITAMIN D3) 5000 UNITS capsule capsule Take 5,000 Units by mouth Every Night.     • Cyanocobalamin (VITAMIN B 12 PO) Take 1,000 mcg by mouth Every Night.     • doxycycline (VIBRAMYCIN) 100 MG capsule Take 1 capsule by mouth 2 (Two) Times a Day. 28 capsule 0   • dronedarone (MULTAQ) 400 MG tablet Take 400 mg by mouth Every Night.     • fluticasone (FLONASE) 50 MCG/ACT nasal spray 2 sprays into each nostril As Needed for Rhinitis.     • glimepiride (AMARYL) 2 MG tablet Take 4 mg by mouth 2 (Two) Times a Day.     •  "HYDROcodone-acetaminophen (NORCO) 7.5-325 MG per tablet Take 1 tablet by mouth Every 6 (Six) Hours As Needed for Moderate Pain . 30 tablet 0   • magnesium oxide (MAGOX) 400 (241.3 Mg) MG tablet tablet Take 400 mg by mouth 2 (Two) Times a Day.     • metFORMIN (GLUCOPHAGE) 1000 MG tablet Take 1,000 mg by mouth 2 (Two) Times a Day With Meals. Patient states he only uses when needed     • metoprolol tartrate (LOPRESSOR) 25 MG tablet Take 12.5 mg by mouth Every Night.     • ONE TOUCH ULTRA TEST test strip USE TO TEST BLOOD SUGAR THREE TIMES A DAY  1   • valsartan (DIOVAN) 160 MG tablet Take 40 mg by mouth Every Night. 1/2 tablet daily      • vitamin C (ASCORBIC ACID) 500 MG tablet Take 500 mg by mouth Every Night.     • vitamin E 200 UNIT capsule Take 200 Units by mouth Every Night.       No current facility-administered medications for this visit.            OBJECTIVE    Pulse 87   Ht 193 cm (76\")   Wt 127 kg (280 lb)   SpO2 98%   BMI 34.08 kg/m²     Review of Systems   Constitutional: Negative for chills and fever.   Cardiovascular: Negative for chest pain.   Gastrointestinal: Negative for constipation, diarrhea, nausea and vomiting.         Constitutional: well developed, well nourished    HEENT: Normocephalic and atraumatic, normal hearing    Respiratory: Non labored respirations noted    LLE: all incision sites are healed.  No drainage noted.  No surrounding erythema noted.  Hallux is slightly abducted.  Remaining digits are rectus.  Negative homans.       Psychiatric: A&O x 3 with normal mood and affect. NAD.       Procedures        ASSESSMENT AND PLAN    Emre was seen today for post-op follow-up.    Diagnoses and all orders for this visit:    Subacute osteomyelitis of left foot    Infected hardware in left lower extremity, subsequent encounter      - Continue  oral antibiotics until course is complete  - Ok to shower. Do not scrub or soak the foot  - transition to regular shoe gear  - RTC in 4 " weeks          This document has been electronically signed by Marco A Thompson DPM on May 2, 2018 8:32 AM

## 2019-07-06 NOTE — NUR
Size 16 Lithuanian jackson catheter placed in Pt.  Pt tolerated well, 10CC of water filled in 
balloon tip.  350ml of urine out with initial placement.

## 2019-07-06 NOTE — NUR
Patient in room PCU 3010. I have received report from Nilton SWIFT and had the opportunity to 
ask questions and assume patient care.

## 2019-07-06 NOTE — NUR
Patient in room PCU 3010. I have received report from Bro SWIFT and had the opportunity to 
ask questions and assume patient care.

## 2019-07-06 NOTE — NUR
PAGER ID:  [phone] 

MESSAGE:  RE; Fermin Alex, Room: 3010. Pt's last three BP's have been 94/50, 85/58 and 
94/50. Pt is asymptomatic. Increase Dobutamine to 5mcg? -[facility] #[id number]



-Dr. Spencer paged concerning Pts BP's

## 2019-07-07 VITALS — DIASTOLIC BLOOD PRESSURE: 79 MMHG | SYSTOLIC BLOOD PRESSURE: 103 MMHG

## 2019-07-07 VITALS — DIASTOLIC BLOOD PRESSURE: 45 MMHG | SYSTOLIC BLOOD PRESSURE: 90 MMHG

## 2019-07-07 VITALS — SYSTOLIC BLOOD PRESSURE: 87 MMHG | DIASTOLIC BLOOD PRESSURE: 46 MMHG

## 2019-07-07 VITALS — SYSTOLIC BLOOD PRESSURE: 90 MMHG | DIASTOLIC BLOOD PRESSURE: 45 MMHG

## 2019-07-07 VITALS — SYSTOLIC BLOOD PRESSURE: 103 MMHG | DIASTOLIC BLOOD PRESSURE: 79 MMHG

## 2019-07-07 VITALS — DIASTOLIC BLOOD PRESSURE: 52 MMHG | SYSTOLIC BLOOD PRESSURE: 114 MMHG

## 2019-07-07 VITALS — DIASTOLIC BLOOD PRESSURE: 55 MMHG | SYSTOLIC BLOOD PRESSURE: 97 MMHG

## 2019-07-07 VITALS — DIASTOLIC BLOOD PRESSURE: 54 MMHG | SYSTOLIC BLOOD PRESSURE: 108 MMHG

## 2019-07-07 VITALS — DIASTOLIC BLOOD PRESSURE: 89 MMHG | SYSTOLIC BLOOD PRESSURE: 105 MMHG

## 2019-07-07 VITALS — SYSTOLIC BLOOD PRESSURE: 119 MMHG | DIASTOLIC BLOOD PRESSURE: 58 MMHG

## 2019-07-07 VITALS — DIASTOLIC BLOOD PRESSURE: 75 MMHG | SYSTOLIC BLOOD PRESSURE: 107 MMHG

## 2019-07-07 VITALS — SYSTOLIC BLOOD PRESSURE: 108 MMHG | DIASTOLIC BLOOD PRESSURE: 59 MMHG

## 2019-07-07 LAB
ALBUMIN SERPL BCP-MCNC: 3.4 G/DL (ref 3.4–5)
ANION GAP SERPL CALCULATED.3IONS-SCNC: 4 MMOL/L (ref 8–16)
ANISOCYTOSIS BLD QL SMEAR: (no result)
BASOPHILS # BLD AUTO: 0 X10'3 (ref 0–0.2)
BASOPHILS NFR BLD AUTO: 0.1 % (ref 0–1)
BUN SERPL-MCNC: 19 MG/DL (ref 7–18)
BUN/CREAT SERPL: 11.7 (ref 5.4–32)
CALCIUM SERPL-MCNC: 8.1 MG/DL (ref 8.5–10.1)
CHLORIDE SERPL-SCNC: 96 MMOL/L (ref 99–107)
CO2 SERPL-SCNC: 34.1 MMOL/L (ref 24–32)
CREAT SERPL-MCNC: 1.63 MG/DL (ref 0.6–1.1)
EOSINOPHIL # BLD AUTO: 0.3 X10'3 (ref 0–0.9)
EOSINOPHIL NFR BLD AUTO: 0.5 % (ref 0–6)
EOSINOPHIL NFR BLD MANUAL: 1 % (ref 0–6)
ERYTHROCYTE [DISTWIDTH] IN BLOOD BY AUTOMATED COUNT: 18.6 % (ref 11.5–14.5)
GFR SERPL CREATININE-BSD FRML MDRD: 42 ML/MIN
GLUCOSE SERPL-MCNC: 86 MG/DL (ref 70–104)
HCT VFR BLD AUTO: 31.4 % (ref 42–52)
HGB BLD-MCNC: 9.2 G/DL (ref 14–17.9)
HYPOCHROMIA BLD QL SMEAR: (no result)
LYMPHOCYTES # BLD AUTO: 51.8 X10'3 (ref 1.1–4.8)
LYMPHOCYTES NFR BLD AUTO: 90 % (ref 21–51)
LYMPHOCYTES NFR BLD MANUAL: 80 % (ref 21–51)
MAGNESIUM SERPL-MCNC: 1.9 MG/DL (ref 1.5–2.4)
MCH RBC QN AUTO: 21.8 PG (ref 27–31)
MCHC RBC AUTO-ENTMCNC: 29.3 G/DL (ref 33–36.5)
MCV RBC AUTO: 74.3 FL (ref 78–98)
MICROCYTES BLD QL SMEAR: (no result)
MONOCYTES # BLD AUTO: 0.9 X10'3 (ref 0–0.9)
MONOCYTES NFR BLD AUTO: 1.6 % (ref 2–12)
MONOCYTES NFR BLD MANUAL: 5 % (ref 2–12)
NEUTROPHILS # BLD AUTO: 4.5 X10'3 (ref 1.8–7.7)
NEUTROPHILS NFR BLD AUTO: 7.8 % (ref 42–75)
NEUTS SEG NFR BLD MANUAL: 14 % (ref 42–75)
PLATELET # BLD AUTO: 122 X10'3 (ref 140–440)
PLATELET BLD QL SMEAR: (no result)
PMV BLD AUTO: 7.4 FL (ref 7.4–10.4)
POLYCHROMASIA BLD QL SMEAR: (no result)
POTASSIUM SERPL-SCNC: 4.1 MMOL/L (ref 3.5–5.1)
RBC # BLD AUTO: 4.23 X10'6 (ref 4.7–6.1)
RBC MORPH BLD: (no result)
SMUDGE CELLS BLD QL SMEAR: (no result)
SODIUM SERPL-SCNC: 134 MMOL/L (ref 135–145)
TOTAL CELLS COUNTED FLD: 100
WBC # BLD AUTO: 57.5 X10'3 (ref 4.5–11)

## 2019-07-07 RX ADMIN — FUROSEMIDE SCH MLS/HR: 10 INJECTION, SOLUTION INTRAMUSCULAR; INTRAVENOUS at 19:09

## 2019-07-07 RX ADMIN — ALBUTEROL SULFATE SCH MG: 2.5 SOLUTION RESPIRATORY (INHALATION) at 20:03

## 2019-07-07 RX ADMIN — ALBUTEROL SULFATE SCH MG: 2.5 SOLUTION RESPIRATORY (INHALATION) at 11:56

## 2019-07-07 RX ADMIN — POTASSIUM CHLORIDE SCH MEQ: 1500 TABLET, EXTENDED RELEASE ORAL at 17:44

## 2019-07-07 RX ADMIN — HYDROCODONE BITARTRATE AND ACETAMINOPHEN PRN TAB: 5; 325 TABLET ORAL at 19:06

## 2019-07-07 RX ADMIN — BUDESONIDE SCH MG: 0.5 INHALANT RESPIRATORY (INHALATION) at 20:03

## 2019-07-07 RX ADMIN — DOBUTAMINE IN DEXTROSE SCH MLS/HR: 200 INJECTION, SOLUTION INTRAVENOUS at 10:59

## 2019-07-07 RX ADMIN — HYDROCODONE BITARTRATE AND ACETAMINOPHEN PRN TAB: 5; 325 TABLET ORAL at 07:17

## 2019-07-07 RX ADMIN — FUROSEMIDE SCH MLS/HR: 10 INJECTION, SOLUTION INTRAMUSCULAR; INTRAVENOUS at 08:07

## 2019-07-07 RX ADMIN — ONDANSETRON PRN MG: 2 INJECTION, SOLUTION INTRAMUSCULAR; INTRAVENOUS at 17:36

## 2019-07-07 RX ADMIN — ALBUTEROL SULFATE SCH MG: 2.5 SOLUTION RESPIRATORY (INHALATION) at 15:42

## 2019-07-07 RX ADMIN — HYDROCODONE BITARTRATE AND ACETAMINOPHEN PRN TAB: 5; 325 TABLET ORAL at 14:19

## 2019-07-07 RX ADMIN — BUDESONIDE SCH MG: 0.5 INHALANT RESPIRATORY (INHALATION) at 06:59

## 2019-07-07 RX ADMIN — FLUTICASONE PROPIONATE PRN SPR: 50 SPRAY, METERED NASAL at 21:39

## 2019-07-07 RX ADMIN — HYDROCODONE BITARTRATE AND ACETAMINOPHEN PRN TAB: 5; 325 TABLET ORAL at 02:30

## 2019-07-07 RX ADMIN — POTASSIUM CHLORIDE SCH MEQ: 1500 TABLET, EXTENDED RELEASE ORAL at 08:10

## 2019-07-07 RX ADMIN — ALBUTEROL SULFATE SCH MG: 2.5 SOLUTION RESPIRATORY (INHALATION) at 06:59

## 2019-07-07 NOTE — NUR
Patient in room PCU 3010. I have received report from  Dominique and had the opportunity to 
ask questions and assume patient care.

## 2019-07-07 NOTE — NUR
Page to Dr Spencer re: Room 3010 Fermin Alex mg 1.9, Lasix in 1 IV and Dobutamine in other 
not compatible. Can I get PO mg order? Dominique [phone]

## 2019-07-07 NOTE — NUR
Had to waste slow mag pills.  I pulled it at scheduled time at 0000 and was last given 5 
hours ago and it's a Q8 medication.

## 2019-07-08 VITALS — SYSTOLIC BLOOD PRESSURE: 120 MMHG | DIASTOLIC BLOOD PRESSURE: 66 MMHG

## 2019-07-08 VITALS — DIASTOLIC BLOOD PRESSURE: 74 MMHG | SYSTOLIC BLOOD PRESSURE: 113 MMHG

## 2019-07-08 VITALS — SYSTOLIC BLOOD PRESSURE: 96 MMHG | DIASTOLIC BLOOD PRESSURE: 56 MMHG

## 2019-07-08 VITALS — SYSTOLIC BLOOD PRESSURE: 91 MMHG | DIASTOLIC BLOOD PRESSURE: 76 MMHG

## 2019-07-08 VITALS — SYSTOLIC BLOOD PRESSURE: 104 MMHG | DIASTOLIC BLOOD PRESSURE: 59 MMHG

## 2019-07-08 VITALS — SYSTOLIC BLOOD PRESSURE: 91 MMHG | DIASTOLIC BLOOD PRESSURE: 60 MMHG

## 2019-07-08 VITALS — DIASTOLIC BLOOD PRESSURE: 56 MMHG | SYSTOLIC BLOOD PRESSURE: 96 MMHG

## 2019-07-08 VITALS — DIASTOLIC BLOOD PRESSURE: 55 MMHG | SYSTOLIC BLOOD PRESSURE: 109 MMHG

## 2019-07-08 VITALS — DIASTOLIC BLOOD PRESSURE: 59 MMHG | SYSTOLIC BLOOD PRESSURE: 90 MMHG

## 2019-07-08 LAB
ALBUMIN SERPL BCP-MCNC: 3.3 G/DL (ref 3.4–5)
ANION GAP SERPL CALCULATED.3IONS-SCNC: 4 MMOL/L (ref 8–16)
ANISOCYTOSIS BLD QL SMEAR: (no result)
BASOPHILS # BLD AUTO: 0.1 X10'3 (ref 0–0.2)
BASOPHILS NFR BLD AUTO: 0.1 % (ref 0–1)
BUN SERPL-MCNC: 20 MG/DL (ref 7–18)
BUN/CREAT SERPL: 11.6 (ref 5.4–32)
CALCIUM SERPL-MCNC: 7.9 MG/DL (ref 8.5–10.1)
CHLORIDE SERPL-SCNC: 94 MMOL/L (ref 99–107)
CO2 SERPL-SCNC: 33.7 MMOL/L (ref 24–32)
CREAT SERPL-MCNC: 1.72 MG/DL (ref 0.6–1.1)
EOSINOPHIL # BLD AUTO: 0.3 X10'3 (ref 0–0.9)
EOSINOPHIL NFR BLD AUTO: 0.5 % (ref 0–6)
ERYTHROCYTE [DISTWIDTH] IN BLOOD BY AUTOMATED COUNT: 18.8 % (ref 11.5–14.5)
GFR SERPL CREATININE-BSD FRML MDRD: 40 ML/MIN
GLUCOSE SERPL-MCNC: 120 MG/DL (ref 70–104)
HCT VFR BLD AUTO: 30.1 % (ref 42–52)
HGB BLD-MCNC: 8.9 G/DL (ref 14–17.9)
HYPOCHROMIA BLD QL SMEAR: (no result)
LYMPHOCYTES # BLD AUTO: 46.8 X10'3 (ref 1.1–4.8)
LYMPHOCYTES NFR BLD AUTO: 89.3 % (ref 21–51)
LYMPHOCYTES NFR BLD MANUAL: 93 % (ref 21–51)
MACROCYTES BLD QL SMEAR: (no result)
MAGNESIUM SERPL-MCNC: 2.1 MG/DL (ref 1.5–2.4)
MCH RBC QN AUTO: 21.9 PG (ref 27–31)
MCHC RBC AUTO-ENTMCNC: 29.7 G/DL (ref 33–36.5)
MCV RBC AUTO: 73.5 FL (ref 78–98)
MICROCYTES BLD QL SMEAR: (no result)
MONOCYTES # BLD AUTO: 0.7 X10'3 (ref 0–0.9)
MONOCYTES NFR BLD AUTO: 1.3 % (ref 2–12)
MONOCYTES NFR BLD MANUAL: 1 % (ref 2–12)
NEUTROPHILS # BLD AUTO: 4.6 X10'3 (ref 1.8–7.7)
NEUTROPHILS NFR BLD AUTO: 8.8 % (ref 42–75)
NEUTS SEG NFR BLD MANUAL: 6 % (ref 42–75)
PLATELET # BLD AUTO: 120 X10'3 (ref 140–440)
PLATELET BLD QL SMEAR: (no result)
PMV BLD AUTO: 7.3 FL (ref 7.4–10.4)
POTASSIUM SERPL-SCNC: 3.9 MMOL/L (ref 3.5–5.1)
RBC # BLD AUTO: 4.09 X10'6 (ref 4.7–6.1)
RBC MORPH BLD: (no result)
SMUDGE CELLS BLD QL SMEAR: (no result)
SODIUM SERPL-SCNC: 132 MMOL/L (ref 135–145)
TOTAL CELLS COUNTED FLD: 100
WBC # BLD AUTO: 52.5 X10'3 (ref 4.5–11)

## 2019-07-08 RX ADMIN — DOBUTAMINE IN DEXTROSE SCH MLS/HR: 200 INJECTION, SOLUTION INTRAVENOUS at 10:03

## 2019-07-08 RX ADMIN — ALBUTEROL SULFATE SCH MG: 2.5 SOLUTION RESPIRATORY (INHALATION) at 15:40

## 2019-07-08 RX ADMIN — HYDROCODONE BITARTRATE AND ACETAMINOPHEN PRN TAB: 5; 325 TABLET ORAL at 14:25

## 2019-07-08 RX ADMIN — HYDROCODONE BITARTRATE AND ACETAMINOPHEN PRN TAB: 5; 325 TABLET ORAL at 05:02

## 2019-07-08 RX ADMIN — POTASSIUM CHLORIDE SCH MEQ: 1500 TABLET, EXTENDED RELEASE ORAL at 08:30

## 2019-07-08 RX ADMIN — ALBUTEROL SULFATE SCH MG: 2.5 SOLUTION RESPIRATORY (INHALATION) at 11:23

## 2019-07-08 RX ADMIN — BUDESONIDE SCH MG: 0.5 INHALANT RESPIRATORY (INHALATION) at 08:18

## 2019-07-08 RX ADMIN — ALBUTEROL SULFATE SCH MG: 2.5 SOLUTION RESPIRATORY (INHALATION) at 08:16

## 2019-07-08 RX ADMIN — ONDANSETRON PRN MG: 2 INJECTION, SOLUTION INTRAMUSCULAR; INTRAVENOUS at 19:01

## 2019-07-08 RX ADMIN — ALBUTEROL SULFATE SCH MG: 2.5 SOLUTION RESPIRATORY (INHALATION) at 20:01

## 2019-07-08 RX ADMIN — HYDROCODONE BITARTRATE AND ACETAMINOPHEN PRN TAB: 5; 325 TABLET ORAL at 19:01

## 2019-07-08 RX ADMIN — FUROSEMIDE SCH MLS/HR: 10 INJECTION, SOLUTION INTRAMUSCULAR; INTRAVENOUS at 05:38

## 2019-07-08 RX ADMIN — ONDANSETRON PRN MG: 2 INJECTION, SOLUTION INTRAMUSCULAR; INTRAVENOUS at 07:31

## 2019-07-08 RX ADMIN — POTASSIUM CHLORIDE SCH MEQ: 1500 TABLET, EXTENDED RELEASE ORAL at 17:00

## 2019-07-08 RX ADMIN — FUROSEMIDE SCH MLS/HR: 10 INJECTION, SOLUTION INTRAMUSCULAR; INTRAVENOUS at 14:02

## 2019-07-08 RX ADMIN — BUDESONIDE SCH MG: 0.5 INHALANT RESPIRATORY (INHALATION) at 19:59

## 2019-07-08 NOTE — NUR
Patient in room PCU 3010. I have received report from Chidi SWIFT and had the opportunity to 
ask questions and assume patient care.

## 2019-07-08 NOTE — NUR
I have reviewed and agree with all medications administered and interventions performed by 
P Student(ANNA PERRIN)

## 2019-07-08 NOTE — NUR
Problems reprioritized. Patient report given, questions answered & plan of care reviewed 
with Chidi SWIFT.

## 2019-07-08 NOTE — NUR
Initial: 

Patient eating well, % average PO intake heart healthy diet. 

Admitted with CHF, anemia h/o CAD, CKD, COPD. There are no nutrition 

problems at this time. Will continue to follow. 



Recommend: 

1. continue heart healthy diet

2. wt per rx

-------------------------------------------------------------------------------

Addendum: 07/08/19 at 1202 by Hue Ramos RD

-------------------------------------------------------------------------------

Amended: Links added.

## 2019-07-08 NOTE — NUR
Patient in room PCU 3010. I have received report from  Evangelina SWIFT  and had the opportunity to 
ask questions and assume patient care.

## 2019-07-09 VITALS — DIASTOLIC BLOOD PRESSURE: 63 MMHG | SYSTOLIC BLOOD PRESSURE: 108 MMHG

## 2019-07-09 VITALS — DIASTOLIC BLOOD PRESSURE: 59 MMHG | SYSTOLIC BLOOD PRESSURE: 99 MMHG

## 2019-07-09 VITALS — SYSTOLIC BLOOD PRESSURE: 108 MMHG | DIASTOLIC BLOOD PRESSURE: 59 MMHG

## 2019-07-09 VITALS — DIASTOLIC BLOOD PRESSURE: 67 MMHG | SYSTOLIC BLOOD PRESSURE: 116 MMHG

## 2019-07-09 VITALS — SYSTOLIC BLOOD PRESSURE: 98 MMHG | DIASTOLIC BLOOD PRESSURE: 61 MMHG

## 2019-07-09 VITALS — DIASTOLIC BLOOD PRESSURE: 56 MMHG | SYSTOLIC BLOOD PRESSURE: 99 MMHG

## 2019-07-09 VITALS — DIASTOLIC BLOOD PRESSURE: 57 MMHG | SYSTOLIC BLOOD PRESSURE: 108 MMHG

## 2019-07-09 VITALS — SYSTOLIC BLOOD PRESSURE: 119 MMHG | DIASTOLIC BLOOD PRESSURE: 74 MMHG

## 2019-07-09 VITALS — DIASTOLIC BLOOD PRESSURE: 58 MMHG | SYSTOLIC BLOOD PRESSURE: 94 MMHG

## 2019-07-09 VITALS — SYSTOLIC BLOOD PRESSURE: 101 MMHG | DIASTOLIC BLOOD PRESSURE: 56 MMHG

## 2019-07-09 VITALS — SYSTOLIC BLOOD PRESSURE: 103 MMHG | DIASTOLIC BLOOD PRESSURE: 66 MMHG

## 2019-07-09 VITALS — DIASTOLIC BLOOD PRESSURE: 65 MMHG | SYSTOLIC BLOOD PRESSURE: 107 MMHG

## 2019-07-09 LAB
ALBUMIN SERPL BCP-MCNC: 3.2 G/DL (ref 3.4–5)
ANION GAP SERPL CALCULATED.3IONS-SCNC: 4 MMOL/L (ref 8–16)
BUN SERPL-MCNC: 22 MG/DL (ref 7–18)
BUN/CREAT SERPL: 11.2 (ref 5.4–32)
CALCIUM SERPL-MCNC: 8 MG/DL (ref 8.5–10.1)
CHLORIDE SERPL-SCNC: 95 MMOL/L (ref 99–107)
CO2 SERPL-SCNC: 34.9 MMOL/L (ref 24–32)
CREAT SERPL-MCNC: 1.96 MG/DL (ref 0.6–1.1)
GFR SERPL CREATININE-BSD FRML MDRD: 34 ML/MIN
GLUCOSE SERPL-MCNC: 86 MG/DL (ref 70–104)
MAGNESIUM SERPL-MCNC: 2.2 MG/DL (ref 1.5–2.4)
POTASSIUM SERPL-SCNC: 4.7 MMOL/L (ref 3.5–5.1)
SODIUM SERPL-SCNC: 134 MMOL/L (ref 135–145)

## 2019-07-09 RX ADMIN — ALBUTEROL SULFATE SCH MG: 2.5 SOLUTION RESPIRATORY (INHALATION) at 15:54

## 2019-07-09 RX ADMIN — POTASSIUM CHLORIDE SCH MEQ: 1500 TABLET, EXTENDED RELEASE ORAL at 17:27

## 2019-07-09 RX ADMIN — FUROSEMIDE SCH MLS/HR: 10 INJECTION, SOLUTION INTRAMUSCULAR; INTRAVENOUS at 00:42

## 2019-07-09 RX ADMIN — FUROSEMIDE SCH MLS/HR: 10 INJECTION, SOLUTION INTRAMUSCULAR; INTRAVENOUS at 21:55

## 2019-07-09 RX ADMIN — ONDANSETRON PRN MG: 2 INJECTION, SOLUTION INTRAMUSCULAR; INTRAVENOUS at 07:34

## 2019-07-09 RX ADMIN — ALBUTEROL SULFATE SCH MG: 2.5 SOLUTION RESPIRATORY (INHALATION) at 07:14

## 2019-07-09 RX ADMIN — POTASSIUM CHLORIDE SCH MEQ: 1500 TABLET, EXTENDED RELEASE ORAL at 07:40

## 2019-07-09 RX ADMIN — ALBUTEROL SULFATE SCH MG: 2.5 SOLUTION RESPIRATORY (INHALATION) at 19:53

## 2019-07-09 RX ADMIN — BUDESONIDE SCH MG: 0.5 INHALANT RESPIRATORY (INHALATION) at 19:52

## 2019-07-09 RX ADMIN — FUROSEMIDE SCH MLS/HR: 10 INJECTION, SOLUTION INTRAMUSCULAR; INTRAVENOUS at 11:00

## 2019-07-09 RX ADMIN — ONDANSETRON PRN MG: 2 INJECTION, SOLUTION INTRAMUSCULAR; INTRAVENOUS at 16:27

## 2019-07-09 RX ADMIN — FLUTICASONE PROPIONATE PRN SPR: 50 SPRAY, METERED NASAL at 10:17

## 2019-07-09 RX ADMIN — HYDROCODONE BITARTRATE AND ACETAMINOPHEN PRN TAB: 5; 325 TABLET ORAL at 07:47

## 2019-07-09 RX ADMIN — HYDROCODONE BITARTRATE AND ACETAMINOPHEN PRN TAB: 5; 325 TABLET ORAL at 20:50

## 2019-07-09 RX ADMIN — ALBUTEROL SULFATE SCH MG: 2.5 SOLUTION RESPIRATORY (INHALATION) at 10:49

## 2019-07-09 RX ADMIN — BUDESONIDE SCH MG: 0.5 INHALANT RESPIRATORY (INHALATION) at 07:14

## 2019-07-09 RX ADMIN — HYDROCODONE BITARTRATE AND ACETAMINOPHEN PRN TAB: 5; 325 TABLET ORAL at 16:01

## 2019-07-09 NOTE — NUR
VS stable throughout the day. Gave pts Coreg and Lisinopril 1hr apart this AM and SBP 
remained >100. 

Norco administered x2 for chronic pain DJD and OA with positive result. 

Zofran administered x2 for intermittent nausea with positive result.

Good urine output via jackson. Pt remains on Lasix gtt @10 ml/hr. 

Walked around the unit with nursing staff x2 this shift. Goal for pt to walk 3-4x/day per 
Dr. Spencer. Pt ambulated well without assistance. 

Plan for possible d/c tomorrow per MD.

## 2019-07-09 NOTE — NUR
Problems reprioritized. Patient report given, questions answered & plan of care reviewed 
with Nanda SWIFT.

## 2019-07-09 NOTE — NUR
I have reviewed and agree with all medications administered and interventions performed by 
[facility] Student Katerine Montgomery

-------------------------------------------------------------------------------

Addendum: 07/09/19 at 0913 by Koko Mccoy RT

-------------------------------------------------------------------------------

Amended: Links added.

## 2019-07-09 NOTE — NUR
Patient in room PCU 3010. I have received report from JENIFFER Garrido and had the opportunity to 
ask questions and assume patient care.

## 2019-07-09 NOTE — NUR
Patient in room PCU 3010. I have received report from Nanda SWIFT and had the opportunity 
to ask questions and assume patient care.

## 2019-07-10 VITALS — SYSTOLIC BLOOD PRESSURE: 92 MMHG | DIASTOLIC BLOOD PRESSURE: 59 MMHG

## 2019-07-10 VITALS — SYSTOLIC BLOOD PRESSURE: 104 MMHG | DIASTOLIC BLOOD PRESSURE: 63 MMHG

## 2019-07-10 VITALS — SYSTOLIC BLOOD PRESSURE: 99 MMHG | DIASTOLIC BLOOD PRESSURE: 63 MMHG

## 2019-07-10 VITALS — SYSTOLIC BLOOD PRESSURE: 103 MMHG | DIASTOLIC BLOOD PRESSURE: 68 MMHG

## 2019-07-10 VITALS — SYSTOLIC BLOOD PRESSURE: 93 MMHG | DIASTOLIC BLOOD PRESSURE: 61 MMHG

## 2019-07-10 LAB
ALBUMIN SERPL BCP-MCNC: 3.3 G/DL (ref 3.4–5)
ANION GAP SERPL CALCULATED.3IONS-SCNC: 3 MMOL/L (ref 8–16)
BUN SERPL-MCNC: 31 MG/DL (ref 7–18)
BUN/CREAT SERPL: 14.5 (ref 5.4–32)
CALCIUM SERPL-MCNC: 10.3 MG/DL (ref 8.5–10.1)
CHLORIDE SERPL-SCNC: 95 MMOL/L (ref 99–107)
CO2 SERPL-SCNC: 35.1 MMOL/L (ref 24–32)
CREAT SERPL-MCNC: 2.14 MG/DL (ref 0.6–1.1)
GFR SERPL CREATININE-BSD FRML MDRD: 31 ML/MIN
GLUCOSE SERPL-MCNC: 119 MG/DL (ref 70–104)
MAGNESIUM SERPL-MCNC: 2.2 MG/DL (ref 1.5–2.4)
POTASSIUM SERPL-SCNC: 5 MMOL/L (ref 3.5–5.1)
SODIUM SERPL-SCNC: 133 MMOL/L (ref 135–145)

## 2019-07-10 RX ADMIN — ALBUTEROL SULFATE SCH MG: 2.5 SOLUTION RESPIRATORY (INHALATION) at 07:02

## 2019-07-10 RX ADMIN — FUROSEMIDE SCH MLS/HR: 10 INJECTION, SOLUTION INTRAMUSCULAR; INTRAVENOUS at 07:13

## 2019-07-10 RX ADMIN — HYDROCODONE BITARTRATE AND ACETAMINOPHEN PRN TAB: 5; 325 TABLET ORAL at 02:48

## 2019-07-10 RX ADMIN — POTASSIUM CHLORIDE SCH MEQ: 1500 TABLET, EXTENDED RELEASE ORAL at 09:07

## 2019-07-10 RX ADMIN — ONDANSETRON PRN MG: 2 INJECTION, SOLUTION INTRAMUSCULAR; INTRAVENOUS at 07:12

## 2019-07-10 RX ADMIN — BUDESONIDE SCH MG: 0.5 INHALANT RESPIRATORY (INHALATION) at 07:02

## 2019-07-10 RX ADMIN — ALBUTEROL SULFATE SCH MG: 2.5 SOLUTION RESPIRATORY (INHALATION) at 11:09

## 2019-07-10 NOTE — NUR
Patient Discharged Home.



Patient discharged home via private vehicle as the patient was picked up by his wife. IV 
catheters removed prior to discharge, catheters intact. Tele leads removed from patient 
prior to discharge. New prescriptions provided to patient and [facility] Discharge instructions provided to patient and 
discussed with patient via RN. All belongings sent home with patient. All questions and 
concerns addressed with patient prior to discharge. Patient escorted out of facility in 
wheelchair via nurse's aide.

## 2019-07-10 NOTE — NUR
Paged Dr. Spencer



PAGER ID:  [phone] 

MESSAGE:  Chidi SWIFT [phone] Fermin Alex: Pt [facility]. Thank you.

## 2019-07-10 NOTE — NUR
Patient in room PCU 3010. I have received report from  Radhika SWIFT  and had the opportunity to 
ask questions and assume patient care.

## 2019-09-14 ENCOUNTER — HOSPITAL ENCOUNTER (INPATIENT)
Dept: HOSPITAL 94 - ER | Age: 69
LOS: 10 days | DRG: 291 | End: 2019-09-24
Attending: HOSPITALIST | Admitting: FAMILY MEDICINE
Payer: MEDICARE

## 2019-09-14 VITALS — SYSTOLIC BLOOD PRESSURE: 98 MMHG | DIASTOLIC BLOOD PRESSURE: 57 MMHG

## 2019-09-14 VITALS — BODY MASS INDEX: 29.2 KG/M2 | WEIGHT: 227.52 LBS | HEIGHT: 74 IN

## 2019-09-14 VITALS — SYSTOLIC BLOOD PRESSURE: 135 MMHG | DIASTOLIC BLOOD PRESSURE: 76 MMHG

## 2019-09-14 DIAGNOSIS — K57.90: ICD-10-CM

## 2019-09-14 DIAGNOSIS — I27.20: ICD-10-CM

## 2019-09-14 DIAGNOSIS — E87.3: ICD-10-CM

## 2019-09-14 DIAGNOSIS — I48.0: ICD-10-CM

## 2019-09-14 DIAGNOSIS — D64.9: ICD-10-CM

## 2019-09-14 DIAGNOSIS — I25.10: ICD-10-CM

## 2019-09-14 DIAGNOSIS — I50.23: ICD-10-CM

## 2019-09-14 DIAGNOSIS — N17.9: ICD-10-CM

## 2019-09-14 DIAGNOSIS — C91.10: ICD-10-CM

## 2019-09-14 DIAGNOSIS — G89.29: ICD-10-CM

## 2019-09-14 DIAGNOSIS — Z66: ICD-10-CM

## 2019-09-14 DIAGNOSIS — J18.1: ICD-10-CM

## 2019-09-14 DIAGNOSIS — I13.0: Primary | ICD-10-CM

## 2019-09-14 DIAGNOSIS — N18.2: ICD-10-CM

## 2019-09-14 DIAGNOSIS — Z99.81: ICD-10-CM

## 2019-09-14 DIAGNOSIS — G93.41: ICD-10-CM

## 2019-09-14 DIAGNOSIS — I42.9: ICD-10-CM

## 2019-09-14 DIAGNOSIS — Z51.5: ICD-10-CM

## 2019-09-14 DIAGNOSIS — J96.22: ICD-10-CM

## 2019-09-14 DIAGNOSIS — M54.9: ICD-10-CM

## 2019-09-14 DIAGNOSIS — I08.1: ICD-10-CM

## 2019-09-14 DIAGNOSIS — E87.6: ICD-10-CM

## 2019-09-14 DIAGNOSIS — J44.1: ICD-10-CM

## 2019-09-14 DIAGNOSIS — I47.1: ICD-10-CM

## 2019-09-14 DIAGNOSIS — R59.0: ICD-10-CM

## 2019-09-14 DIAGNOSIS — J44.0: ICD-10-CM

## 2019-09-14 DIAGNOSIS — M54.2: ICD-10-CM

## 2019-09-14 DIAGNOSIS — J96.21: ICD-10-CM

## 2019-09-14 DIAGNOSIS — K21.9: ICD-10-CM

## 2019-09-14 LAB
ALBUMIN SERPL BCP-MCNC: 3.8 G/DL (ref 3.4–5)
ALBUMIN/GLOB SERPL: 1.3 {RATIO} (ref 1.1–1.5)
ALP SERPL-CCNC: 91 IU/L (ref 46–116)
ALT SERPL W P-5'-P-CCNC: 22 U/L (ref 12–78)
ANION GAP SERPL CALCULATED.3IONS-SCNC: 2 MMOL/L (ref 8–16)
ANISOCYTOSIS BLD QL SMEAR: (no result)
AST SERPL W P-5'-P-CCNC: 26 U/L (ref 10–37)
BASE EXCESS BLDA CALC-SCNC: 18.9 MMOL/L (ref -2–3)
BASOPHILS # BLD AUTO: 0.4 X10'3 (ref 0–0.2)
BASOPHILS NFR BLD AUTO: 0.3 % (ref 0–1)
BILIRUB SERPL-MCNC: 1.4 MG/DL (ref 0.1–1)
BODY TEMPERATURE: 37
BUN SERPL-MCNC: 87 MG/DL (ref 7–18)
BUN/CREAT SERPL: 32.5 (ref 5.4–32)
CALCIUM SERPL-MCNC: 9.1 MG/DL (ref 8.5–10.1)
CHLORIDE SERPL-SCNC: 90 MMOL/L (ref 99–107)
CO2 SERPL-SCNC: 43.6 MMOL/L (ref 24–32)
COHGB MFR BLDA: 0.9 % (ref 0.5–1.5)
CREAT SERPL-MCNC: 2.68 MG/DL (ref 0.6–1.1)
EOSINOPHIL # BLD AUTO: 0.5 X10'3 (ref 0–0.9)
EOSINOPHIL NFR BLD AUTO: 0.4 % (ref 0–6)
ERYTHROCYTE [DISTWIDTH] IN BLOOD BY AUTOMATED COUNT: 19.8 % (ref 11.5–14.5)
GFR SERPL CREATININE-BSD FRML MDRD: 24 ML/MIN
GLUCOSE SERPL-MCNC: 121 MG/DL (ref 70–104)
HCO3 BLDA-SCNC: 45.2 MMOL/L (ref 22–26)
HCT VFR BLD AUTO: 30 % (ref 42–52)
HGB BLD-MCNC: 8.2 G/DL (ref 14–17.9)
HGB BLDA-MCNC: 9.2 G/DL (ref 14–17.9)
LG PLATELETS BLD QL SMEAR: (no result)
LYMPHOCYTES # BLD AUTO: 112 X10'3 (ref 1.1–4.8)
LYMPHOCYTES NFR BLD AUTO: 89.7 % (ref 21–51)
LYMPHOCYTES NFR BLD MANUAL: 88 % (ref 21–51)
MCH RBC QN AUTO: 24 PG (ref 27–31)
MCHC RBC AUTO-ENTMCNC: 27.4 G/DL (ref 33–36.5)
MCV RBC AUTO: 87.8 FL (ref 78–98)
METHGB MFR BLDA: 0.2 % (ref 0.3–1.12)
MONOCYTES # BLD AUTO: 1.9 X10'3 (ref 0–0.9)
MONOCYTES NFR BLD AUTO: 1.5 % (ref 2–12)
MONOCYTES NFR BLD MANUAL: 2 % (ref 2–12)
NEUTROPHILS # BLD AUTO: 10.1 X10'3 (ref 1.8–7.7)
NEUTROPHILS NFR BLD AUTO: 8.1 % (ref 42–75)
NEUTS SEG NFR BLD MANUAL: 10 % (ref 42–75)
O2/TOTAL GAS SETTING VFR VENT: 90 MMHG/%
OXYHGB MFR BLDA: 86.8 % (ref 94–100)
PCO2 TEMP ADJ BLDA: 64.6 MMHG (ref 35–45)
PH TEMP ADJ BLDA: 7.46 [PH] (ref 7.35–7.45)
PLATELET # BLD AUTO: 151 X10'3 (ref 140–440)
PLATELET BLD QL SMEAR: NORMAL
PMV BLD AUTO: 8.5 FL (ref 7.4–10.4)
PO2 TEMP ADJ BLD: 55.2 MMHG (ref 83–108)
POTASSIUM SERPL-SCNC: 3.2 MMOL/L (ref 3.5–5.1)
PROT SERPL-MCNC: 6.7 G/DL (ref 6.4–8.2)
RBC # BLD AUTO: 3.42 X10'6 (ref 4.7–6.1)
RBC MORPH BLD: (no result)
SAO2 % BLDA: 87.8 % (ref 95–98)
SMUDGE CELLS BLD QL SMEAR: (no result)
SODIUM SERPL-SCNC: 136 MMOL/L (ref 135–145)
TOTAL CELLS COUNTED FLD: 100
WBC # BLD AUTO: 124.7 X10'3 (ref 4.5–11)

## 2019-09-14 PROCEDURE — 97535 SELF CARE MNGMENT TRAINING: CPT

## 2019-09-14 PROCEDURE — 86901 BLOOD TYPING SEROLOGIC RH(D): CPT

## 2019-09-14 PROCEDURE — 84484 ASSAY OF TROPONIN QUANT: CPT

## 2019-09-14 PROCEDURE — 88108 CYTOPATH CONCENTRATE TECH: CPT

## 2019-09-14 PROCEDURE — 71045 X-RAY EXAM CHEST 1 VIEW: CPT

## 2019-09-14 PROCEDURE — 97162 PT EVAL MOD COMPLEX 30 MIN: CPT

## 2019-09-14 PROCEDURE — 36600 WITHDRAWAL OF ARTERIAL BLOOD: CPT

## 2019-09-14 PROCEDURE — 85610 PROTHROMBIN TIME: CPT

## 2019-09-14 PROCEDURE — 99291 CRITICAL CARE FIRST HOUR: CPT

## 2019-09-14 PROCEDURE — 85027 COMPLETE CBC AUTOMATED: CPT

## 2019-09-14 PROCEDURE — 83605 ASSAY OF LACTIC ACID: CPT

## 2019-09-14 PROCEDURE — 76937 US GUIDE VASCULAR ACCESS: CPT

## 2019-09-14 PROCEDURE — 94640 AIRWAY INHALATION TREATMENT: CPT

## 2019-09-14 PROCEDURE — 87070 CULTURE OTHR SPECIMN AEROBIC: CPT

## 2019-09-14 PROCEDURE — 97116 GAIT TRAINING THERAPY: CPT

## 2019-09-14 PROCEDURE — 82803 BLOOD GASES ANY COMBINATION: CPT

## 2019-09-14 PROCEDURE — 97530 THERAPEUTIC ACTIVITIES: CPT

## 2019-09-14 PROCEDURE — 84157 ASSAY OF PROTEIN OTHER: CPT

## 2019-09-14 PROCEDURE — 97110 THERAPEUTIC EXERCISES: CPT

## 2019-09-14 PROCEDURE — 89051 BODY FLUID CELL COUNT: CPT

## 2019-09-14 PROCEDURE — 87077 CULTURE AEROBIC IDENTIFY: CPT

## 2019-09-14 PROCEDURE — 84132 ASSAY OF SERUM POTASSIUM: CPT

## 2019-09-14 PROCEDURE — 93005 ELECTROCARDIOGRAM TRACING: CPT

## 2019-09-14 PROCEDURE — 83735 ASSAY OF MAGNESIUM: CPT

## 2019-09-14 PROCEDURE — 94660 CPAP INITIATION&MGMT: CPT

## 2019-09-14 PROCEDURE — 86885 COOMBS TEST INDIRECT QUAL: CPT

## 2019-09-14 PROCEDURE — 96365 THER/PROPH/DIAG IV INF INIT: CPT

## 2019-09-14 PROCEDURE — 83880 ASSAY OF NATRIURETIC PEPTIDE: CPT

## 2019-09-14 PROCEDURE — 88305 TISSUE EXAM BY PATHOLOGIST: CPT

## 2019-09-14 PROCEDURE — 85018 HEMOGLOBIN: CPT

## 2019-09-14 PROCEDURE — 5A09357 ASSISTANCE WITH RESPIRATORY VENTILATION, LESS THAN 24 CONSECUTIVE HOURS, CONTINUOUS POSITIVE AIRWAY PRESSURE: ICD-10-PCS | Performed by: FAMILY MEDICINE

## 2019-09-14 PROCEDURE — 85025 COMPLETE CBC W/AUTO DIFF WBC: CPT

## 2019-09-14 PROCEDURE — 87186 SC STD MICRODIL/AGAR DIL: CPT

## 2019-09-14 PROCEDURE — 96366 THER/PROPH/DIAG IV INF ADDON: CPT

## 2019-09-14 PROCEDURE — 88360 TUMOR IMMUNOHISTOCHEM/MANUAL: CPT

## 2019-09-14 PROCEDURE — 88341 IMHCHEM/IMCYTCHM EA ADD ANTB: CPT

## 2019-09-14 PROCEDURE — 94760 N-INVAS EAR/PLS OXIMETRY 1: CPT

## 2019-09-14 PROCEDURE — 87040 BLOOD CULTURE FOR BACTERIA: CPT

## 2019-09-14 PROCEDURE — 97112 NEUROMUSCULAR REEDUCATION: CPT

## 2019-09-14 PROCEDURE — 36415 COLL VENOUS BLD VENIPUNCTURE: CPT

## 2019-09-14 PROCEDURE — 86900 BLOOD TYPING SEROLOGIC ABO: CPT

## 2019-09-14 PROCEDURE — 80048 BASIC METABOLIC PNL TOTAL CA: CPT

## 2019-09-14 PROCEDURE — 96367 TX/PROPH/DG ADDL SEQ IV INF: CPT

## 2019-09-14 PROCEDURE — 84100 ASSAY OF PHOSPHORUS: CPT

## 2019-09-14 PROCEDURE — 32555 ASPIRATE PLEURA W/ IMAGING: CPT

## 2019-09-14 PROCEDURE — 80053 COMPREHEN METABOLIC PANEL: CPT

## 2019-09-14 PROCEDURE — 88342 IMHCHEM/IMCYTCHM 1ST ANTB: CPT

## 2019-09-14 PROCEDURE — 87081 CULTURE SCREEN ONLY: CPT

## 2019-09-14 RX ADMIN — BUDESONIDE SCH MG: 0.5 INHALANT RESPIRATORY (INHALATION) at 20:04

## 2019-09-14 RX ADMIN — ALBUTEROL SULFATE SCH MG: 2.5 SOLUTION RESPIRATORY (INHALATION) at 20:05

## 2019-09-14 RX ADMIN — HYDROCODONE BITARTRATE AND ACETAMINOPHEN SCH TAB: 10; 325 TABLET ORAL at 20:18

## 2019-09-14 RX ADMIN — PANTOPRAZOLE SODIUM SCH MG: 40 TABLET, DELAYED RELEASE ORAL at 20:19

## 2019-09-14 RX ADMIN — IPRATROPIUM BROMIDE AND ALBUTEROL SULFATE SCH ML: .5; 3 SOLUTION RESPIRATORY (INHALATION) at 23:19

## 2019-09-14 RX ADMIN — TAZOBACTAM SODIUM AND PIPERACILLIN SODIUM SCH MLS/HR: 375; 3 INJECTION, SOLUTION INTRAVENOUS at 19:58

## 2019-09-14 RX ADMIN — DOCUSATE SODIUM SCH MG: 100 CAPSULE, LIQUID FILLED ORAL at 20:19

## 2019-09-14 RX ADMIN — IPRATROPIUM BROMIDE AND ALBUTEROL SULFATE SCH ML: .5; 3 SOLUTION RESPIRATORY (INHALATION) at 20:04

## 2019-09-14 NOTE — NUR
Pt has been transferred from ED to [facility] 3894 via janice w/ 1 rn @2575, pt tranferred to bed, 
mobile 7 in place, IV Vanco and IVF running, all needs met at this time, will continue to 
monitor.

-------------------------------------------------------------------------------

Addendum: 09/14/19 at 1750 by Puneet Yeager RN

-------------------------------------------------------------------------------

/63 90 24 96% on max flow rate, Paged RT for Bipap per md orders

## 2019-09-15 VITALS — DIASTOLIC BLOOD PRESSURE: 59 MMHG | SYSTOLIC BLOOD PRESSURE: 98 MMHG

## 2019-09-15 VITALS — DIASTOLIC BLOOD PRESSURE: 48 MMHG | SYSTOLIC BLOOD PRESSURE: 90 MMHG

## 2019-09-15 VITALS — DIASTOLIC BLOOD PRESSURE: 58 MMHG | SYSTOLIC BLOOD PRESSURE: 100 MMHG

## 2019-09-15 VITALS — SYSTOLIC BLOOD PRESSURE: 96 MMHG | DIASTOLIC BLOOD PRESSURE: 53 MMHG

## 2019-09-15 VITALS — SYSTOLIC BLOOD PRESSURE: 107 MMHG | DIASTOLIC BLOOD PRESSURE: 51 MMHG

## 2019-09-15 VITALS — SYSTOLIC BLOOD PRESSURE: 106 MMHG | DIASTOLIC BLOOD PRESSURE: 59 MMHG

## 2019-09-15 VITALS — DIASTOLIC BLOOD PRESSURE: 53 MMHG | SYSTOLIC BLOOD PRESSURE: 103 MMHG

## 2019-09-15 VITALS — DIASTOLIC BLOOD PRESSURE: 54 MMHG | SYSTOLIC BLOOD PRESSURE: 105 MMHG

## 2019-09-15 LAB
ALBUMIN SERPL BCP-MCNC: 3.3 G/DL (ref 3.4–5)
ALBUMIN/GLOB SERPL: 1.1 {RATIO} (ref 1.1–1.5)
ALP SERPL-CCNC: 78 IU/L (ref 46–116)
ALT SERPL W P-5'-P-CCNC: 17 U/L (ref 12–78)
ANION GAP SERPL CALCULATED.3IONS-SCNC: 3 MMOL/L (ref 8–16)
ANISOCYTOSIS BLD QL SMEAR: (no result)
AST SERPL W P-5'-P-CCNC: 26 U/L (ref 10–37)
BASOPHILS # BLD AUTO: 0.1 X10'3 (ref 0–0.2)
BASOPHILS NFR BLD AUTO: 0.2 % (ref 0–1)
BILIRUB SERPL-MCNC: 1.7 MG/DL (ref 0.1–1)
BLASTS NFR BLD MANUAL: 6 % (ref 0–0)
BUN SERPL-MCNC: 77 MG/DL (ref 7–18)
BUN/CREAT SERPL: 32.4 (ref 5.4–32)
CALCIUM SERPL-MCNC: 8.7 MG/DL (ref 8.5–10.1)
CHLORIDE SERPL-SCNC: 92 MMOL/L (ref 99–107)
CO2 SERPL-SCNC: 45.3 MMOL/L (ref 24–32)
CREAT SERPL-MCNC: 2.38 MG/DL (ref 0.6–1.1)
EOSINOPHIL # BLD AUTO: 0.2 X10'3 (ref 0–0.9)
EOSINOPHIL NFR BLD AUTO: 0.2 % (ref 0–6)
ERYTHROCYTE [DISTWIDTH] IN BLOOD BY AUTOMATED COUNT: 20 % (ref 11.5–14.5)
GFR SERPL CREATININE-BSD FRML MDRD: 27 ML/MIN
GLUCOSE SERPL-MCNC: 110 MG/DL (ref 70–104)
HCT VFR BLD AUTO: 25.9 % (ref 42–52)
HGB BLD-MCNC: 7.4 G/DL (ref 14–17.9)
LG PLATELETS BLD QL SMEAR: (no result)
LYMPHOCYTES # BLD AUTO: 74.7 X10'3 (ref 1.1–4.8)
LYMPHOCYTES NFR BLD AUTO: 89.1 % (ref 21–51)
LYMPHOCYTES NFR BLD MANUAL: 88 % (ref 21–51)
MAGNESIUM SERPL-MCNC: 1.7 MG/DL (ref 1.5–2.4)
MCH RBC QN AUTO: 24.6 PG (ref 27–31)
MCHC RBC AUTO-ENTMCNC: 28.5 G/DL (ref 33–36.5)
MCV RBC AUTO: 86.6 FL (ref 78–98)
MONOCYTES # BLD AUTO: 1.5 X10'3 (ref 0–0.9)
MONOCYTES NFR BLD AUTO: 1.8 % (ref 2–12)
MONOCYTES NFR BLD MANUAL: 3 % (ref 2–12)
NEUTROPHILS # BLD AUTO: 7.3 X10'3 (ref 1.8–7.7)
NEUTROPHILS NFR BLD AUTO: 8.7 % (ref 42–75)
NEUTS SEG NFR BLD MANUAL: 3 % (ref 42–75)
PHOSPHATE SERPL-MCNC: 4.1 MG/DL (ref 2.3–4.5)
PLATELET # BLD AUTO: 113 X10'3 (ref 140–440)
PLATELET BLD QL SMEAR: (no result)
PMV BLD AUTO: 8.4 FL (ref 7.4–10.4)
POTASSIUM SERPL-SCNC: 3.8 MMOL/L (ref 3.5–5.1)
PROT SERPL-MCNC: 6.2 G/DL (ref 6.4–8.2)
RBC # BLD AUTO: 2.99 X10'6 (ref 4.7–6.1)
RBC MORPH BLD: (no result)
SMUDGE CELLS BLD QL SMEAR: (no result)
SODIUM SERPL-SCNC: 140 MMOL/L (ref 135–145)
TOTAL CELLS COUNTED FLD: 100
WBC # BLD AUTO: 83.9 X10'3 (ref 4.5–11)

## 2019-09-15 PROCEDURE — 5A09357 ASSISTANCE WITH RESPIRATORY VENTILATION, LESS THAN 24 CONSECUTIVE HOURS, CONTINUOUS POSITIVE AIRWAY PRESSURE: ICD-10-PCS | Performed by: FAMILY MEDICINE

## 2019-09-15 RX ADMIN — ALBUTEROL SULFATE SCH MG: 2.5 SOLUTION RESPIRATORY (INHALATION) at 19:26

## 2019-09-15 RX ADMIN — IPRATROPIUM BROMIDE AND ALBUTEROL SULFATE SCH ML: .5; 3 SOLUTION RESPIRATORY (INHALATION) at 23:47

## 2019-09-15 RX ADMIN — TAZOBACTAM SODIUM AND PIPERACILLIN SODIUM SCH MLS/HR: 375; 3 INJECTION, SOLUTION INTRAVENOUS at 16:10

## 2019-09-15 RX ADMIN — IPRATROPIUM BROMIDE AND ALBUTEROL SULFATE SCH ML: .5; 3 SOLUTION RESPIRATORY (INHALATION) at 06:55

## 2019-09-15 RX ADMIN — PANTOPRAZOLE SODIUM SCH MG: 40 TABLET, DELAYED RELEASE ORAL at 19:58

## 2019-09-15 RX ADMIN — HYDROCODONE BITARTRATE AND ACETAMINOPHEN SCH TAB: 10; 325 TABLET ORAL at 07:24

## 2019-09-15 RX ADMIN — HYDROCODONE BITARTRATE AND ACETAMINOPHEN SCH TAB: 10; 325 TABLET ORAL at 02:00

## 2019-09-15 RX ADMIN — PANTOPRAZOLE SODIUM SCH MG: 40 TABLET, DELAYED RELEASE ORAL at 07:23

## 2019-09-15 RX ADMIN — IPRATROPIUM BROMIDE AND ALBUTEROL SULFATE SCH ML: .5; 3 SOLUTION RESPIRATORY (INHALATION) at 19:26

## 2019-09-15 RX ADMIN — IPRATROPIUM BROMIDE AND ALBUTEROL SULFATE SCH ML: .5; 3 SOLUTION RESPIRATORY (INHALATION) at 14:24

## 2019-09-15 RX ADMIN — TAZOBACTAM SODIUM AND PIPERACILLIN SODIUM SCH MLS/HR: 375; 3 INJECTION, SOLUTION INTRAVENOUS at 07:23

## 2019-09-15 RX ADMIN — ONDANSETRON PRN MG: 2 INJECTION, SOLUTION INTRAMUSCULAR; INTRAVENOUS at 13:36

## 2019-09-15 RX ADMIN — TAZOBACTAM SODIUM AND PIPERACILLIN SODIUM SCH MLS/HR: 375; 3 INJECTION, SOLUTION INTRAVENOUS at 00:19

## 2019-09-15 RX ADMIN — HYDROCODONE BITARTRATE AND ACETAMINOPHEN SCH TAB: 10; 325 TABLET ORAL at 13:36

## 2019-09-15 RX ADMIN — IPRATROPIUM BROMIDE AND ALBUTEROL SULFATE SCH ML: .5; 3 SOLUTION RESPIRATORY (INHALATION) at 10:58

## 2019-09-15 RX ADMIN — HYDROCODONE BITARTRATE AND ACETAMINOPHEN SCH TAB: 10; 325 TABLET ORAL at 20:00

## 2019-09-15 RX ADMIN — DOCUSATE SODIUM SCH MG: 100 CAPSULE, LIQUID FILLED ORAL at 21:01

## 2019-09-15 RX ADMIN — BUDESONIDE SCH MG: 0.5 INHALANT RESPIRATORY (INHALATION) at 06:55

## 2019-09-15 RX ADMIN — ALBUTEROL SULFATE SCH MG: 2.5 SOLUTION RESPIRATORY (INHALATION) at 03:37

## 2019-09-15 RX ADMIN — FLUTICASONE PROPIONATE SCH SPR: 50 SPRAY, METERED NASAL at 09:25

## 2019-09-15 RX ADMIN — Medication SCH MMU: at 19:58

## 2019-09-15 RX ADMIN — BUDESONIDE SCH MG: 0.5 INHALANT RESPIRATORY (INHALATION) at 19:26

## 2019-09-15 NOTE — NUR
Problems reprioritized. Patient report given, questions answered & plan of care reviewed 
with JENIFFER Salamanca.  Patient stable at transfer of care.

## 2019-09-15 NOTE — NUR
Student Medication Administration:

For this medication-pass time frame, all medication were reviewed, dispensed, administered 
and documented per hospital policy by Margaret GOMEZ. 



Student documentation:

I have reviewed and agree with all interventions, assessments performed and documented by 
Margaret GOMEZ.

## 2019-09-15 NOTE — NUR
Problems reprioritized. Patient report given, questions answered & plan of care reviewed 
with Lydia SWIFT & Alice RN.

## 2019-09-15 NOTE — NUR
New orders from Dr. Gonzalez:



I/R for Thoracentesis for Right pleural effusion

Replace Potassium to 4.0.  Replace magnesium to 2.0

Give 40 mg IV lasix for SPB > 90.  Give 3.125 PO coreg for SBP > 100.

## 2019-09-15 NOTE — NUR
Orientee documentation:

I have reviewed and agree with all interventions, assessments performed and documented by 
JENIFFER Jenkins.



Orientee Medication Administration:

For this medication-pass time frame, all medication were reviewed, dispensed, administered 
and documented per hospital policy by Alice SWIFT.

## 2019-09-15 NOTE — NUR
Patient in room PCU 3010. I have received report from Kelly SWIFT  and had the opportunity to 
ask questions and assume patient care.  Patient awake in bed and in no acute distress.  All 
immediate needs met at this time.

## 2019-09-15 NOTE — NUR
Problems reprioritized. Patient report given, questions answered & plan of care reviewed 
with Jadyn SWIFT .

## 2019-09-16 VITALS — DIASTOLIC BLOOD PRESSURE: 57 MMHG | SYSTOLIC BLOOD PRESSURE: 93 MMHG

## 2019-09-16 VITALS — DIASTOLIC BLOOD PRESSURE: 60 MMHG | SYSTOLIC BLOOD PRESSURE: 96 MMHG

## 2019-09-16 VITALS — SYSTOLIC BLOOD PRESSURE: 97 MMHG | DIASTOLIC BLOOD PRESSURE: 52 MMHG

## 2019-09-16 VITALS — SYSTOLIC BLOOD PRESSURE: 103 MMHG | DIASTOLIC BLOOD PRESSURE: 53 MMHG

## 2019-09-16 VITALS — SYSTOLIC BLOOD PRESSURE: 114 MMHG | DIASTOLIC BLOOD PRESSURE: 63 MMHG

## 2019-09-16 VITALS — DIASTOLIC BLOOD PRESSURE: 50 MMHG | SYSTOLIC BLOOD PRESSURE: 73 MMHG

## 2019-09-16 VITALS — DIASTOLIC BLOOD PRESSURE: 54 MMHG | SYSTOLIC BLOOD PRESSURE: 108 MMHG

## 2019-09-16 VITALS — SYSTOLIC BLOOD PRESSURE: 91 MMHG | DIASTOLIC BLOOD PRESSURE: 56 MMHG

## 2019-09-16 VITALS — DIASTOLIC BLOOD PRESSURE: 50 MMHG | SYSTOLIC BLOOD PRESSURE: 84 MMHG

## 2019-09-16 LAB
ALBUMIN SERPL BCP-MCNC: 3.2 G/DL (ref 3.4–5)
ALBUMIN/GLOB SERPL: 1 {RATIO} (ref 1.1–1.5)
ALP SERPL-CCNC: 73 IU/L (ref 46–116)
ALT SERPL W P-5'-P-CCNC: 16 U/L (ref 12–78)
ANION GAP SERPL CALCULATED.3IONS-SCNC: 7 MMOL/L (ref 8–16)
ANISOCYTOSIS BLD QL SMEAR: (no result)
AST SERPL W P-5'-P-CCNC: 17 U/L (ref 10–37)
BASOPHILS # BLD AUTO: 0 X10'3 (ref 0–0.2)
BASOPHILS NFR BLD AUTO: 0 % (ref 0–1)
BILIRUB SERPL-MCNC: 1.5 MG/DL (ref 0.1–1)
BLASTS NFR BLD MANUAL: 4 % (ref 0–0)
BUN SERPL-MCNC: 77 MG/DL (ref 7–18)
BUN/CREAT SERPL: 32.2 (ref 5.4–32)
CALCIUM SERPL-MCNC: 8.9 MG/DL (ref 8.5–10.1)
CHLORIDE SERPL-SCNC: 92 MMOL/L (ref 99–107)
CO2 SERPL-SCNC: 44 MMOL/L (ref 24–32)
CREAT SERPL-MCNC: 2.39 MG/DL (ref 0.6–1.1)
EOSINOPHIL # BLD AUTO: 0.1 X10'3 (ref 0–0.9)
EOSINOPHIL NFR BLD AUTO: 0.1 % (ref 0–6)
EOSINOPHIL NFR BLD MANUAL: 1 % (ref 0–6)
ERYTHROCYTE [DISTWIDTH] IN BLOOD BY AUTOMATED COUNT: 20 % (ref 11.5–14.5)
GFR SERPL CREATININE-BSD FRML MDRD: 27 ML/MIN
GLUCOSE SERPL-MCNC: 82 MG/DL (ref 70–104)
HCT VFR BLD AUTO: 25.1 % (ref 42–52)
HGB BLD-MCNC: 7 G/DL (ref 14–17.9)
LYMPHOCYTES # BLD AUTO: 60.4 X10'3 (ref 1.1–4.8)
LYMPHOCYTES NFR BLD AUTO: 88.3 % (ref 21–51)
LYMPHOCYTES NFR BLD MANUAL: 92 % (ref 21–51)
MAGNESIUM SERPL-MCNC: 1.5 MG/DL (ref 1.5–2.4)
MCH RBC QN AUTO: 24.6 PG (ref 27–31)
MCHC RBC AUTO-ENTMCNC: 28 G/DL (ref 33–36.5)
MCV RBC AUTO: 87.9 FL (ref 78–98)
MONOCYTES # BLD AUTO: 0.9 X10'3 (ref 0–0.9)
MONOCYTES NFR BLD AUTO: 1.4 % (ref 2–12)
NEUTROPHILS # BLD AUTO: 7 X10'3 (ref 1.8–7.7)
NEUTROPHILS NFR BLD AUTO: 10.2 % (ref 42–75)
NEUTS SEG NFR BLD MANUAL: 3 % (ref 42–75)
PHOSPHATE SERPL-MCNC: 3.8 MG/DL (ref 2.3–4.5)
PLATELET # BLD AUTO: 112 X10'3 (ref 140–440)
PLATELET BLD QL SMEAR: (no result)
PMV BLD AUTO: 8.4 FL (ref 7.4–10.4)
POTASSIUM SERPL-SCNC: 2.7 MMOL/L (ref 3.5–5.1)
PROT SERPL-MCNC: 6.4 G/DL (ref 6.4–8.2)
RBC # BLD AUTO: 2.85 X10'6 (ref 4.7–6.1)
RBC MORPH BLD: (no result)
SMUDGE CELLS BLD QL SMEAR: (no result)
SODIUM SERPL-SCNC: 143 MMOL/L (ref 135–145)
STOMATOCYTES BLD QL SMEAR: (no result)
TOTAL CELLS COUNTED FLD: 100
WBC # BLD AUTO: 68.3 X10'3 (ref 4.5–11)

## 2019-09-16 PROCEDURE — 5A09357 ASSISTANCE WITH RESPIRATORY VENTILATION, LESS THAN 24 CONSECUTIVE HOURS, CONTINUOUS POSITIVE AIRWAY PRESSURE: ICD-10-PCS | Performed by: FAMILY MEDICINE

## 2019-09-16 RX ADMIN — POTASSIUM CHLORIDE PRN MEQ: 1500 TABLET, EXTENDED RELEASE ORAL at 15:31

## 2019-09-16 RX ADMIN — IPRATROPIUM BROMIDE AND ALBUTEROL SULFATE SCH ML: .5; 3 SOLUTION RESPIRATORY (INHALATION) at 22:52

## 2019-09-16 RX ADMIN — HYDROCODONE BITARTRATE AND ACETAMINOPHEN SCH TAB: 10; 325 TABLET ORAL at 13:09

## 2019-09-16 RX ADMIN — PANTOPRAZOLE SODIUM SCH MG: 40 TABLET, DELAYED RELEASE ORAL at 07:35

## 2019-09-16 RX ADMIN — IPRATROPIUM BROMIDE AND ALBUTEROL SULFATE SCH ML: .5; 3 SOLUTION RESPIRATORY (INHALATION) at 10:59

## 2019-09-16 RX ADMIN — TAZOBACTAM SODIUM AND PIPERACILLIN SODIUM SCH MLS/HR: 375; 3 INJECTION, SOLUTION INTRAVENOUS at 15:32

## 2019-09-16 RX ADMIN — IPRATROPIUM BROMIDE AND ALBUTEROL SULFATE SCH ML: .5; 3 SOLUTION RESPIRATORY (INHALATION) at 06:47

## 2019-09-16 RX ADMIN — ALBUTEROL SULFATE SCH MG: 2.5 SOLUTION RESPIRATORY (INHALATION) at 19:07

## 2019-09-16 RX ADMIN — HYDROCODONE BITARTRATE AND ACETAMINOPHEN SCH TAB: 10; 325 TABLET ORAL at 20:33

## 2019-09-16 RX ADMIN — HYDROCODONE BITARTRATE AND ACETAMINOPHEN SCH TAB: 10; 325 TABLET ORAL at 08:16

## 2019-09-16 RX ADMIN — BUDESONIDE SCH MG: 0.5 INHALANT RESPIRATORY (INHALATION) at 06:47

## 2019-09-16 RX ADMIN — ALBUTEROL SULFATE SCH MG: 2.5 SOLUTION RESPIRATORY (INHALATION) at 03:02

## 2019-09-16 RX ADMIN — POTASSIUM CHLORIDE PRN MEQ: 1500 TABLET, EXTENDED RELEASE ORAL at 11:31

## 2019-09-16 RX ADMIN — HYDROCODONE BITARTRATE AND ACETAMINOPHEN SCH TAB: 10; 325 TABLET ORAL at 02:00

## 2019-09-16 RX ADMIN — IPRATROPIUM BROMIDE AND ALBUTEROL SULFATE SCH ML: .5; 3 SOLUTION RESPIRATORY (INHALATION) at 15:55

## 2019-09-16 RX ADMIN — FLUTICASONE PROPIONATE SCH SPR: 50 SPRAY, METERED NASAL at 07:31

## 2019-09-16 RX ADMIN — TAZOBACTAM SODIUM AND PIPERACILLIN SODIUM SCH MLS/HR: 375; 3 INJECTION, SOLUTION INTRAVENOUS at 07:33

## 2019-09-16 RX ADMIN — POTASSIUM CHLORIDE PRN MEQ: 1500 TABLET, EXTENDED RELEASE ORAL at 07:34

## 2019-09-16 RX ADMIN — DOCUSATE SODIUM SCH MG: 100 CAPSULE, LIQUID FILLED ORAL at 20:27

## 2019-09-16 RX ADMIN — PANTOPRAZOLE SODIUM SCH MG: 40 TABLET, DELAYED RELEASE ORAL at 20:27

## 2019-09-16 RX ADMIN — BUDESONIDE SCH MG: 0.5 INHALANT RESPIRATORY (INHALATION) at 19:06

## 2019-09-16 RX ADMIN — Medication SCH MMU: at 07:33

## 2019-09-16 RX ADMIN — IPRATROPIUM BROMIDE AND ALBUTEROL SULFATE SCH ML: .5; 3 SOLUTION RESPIRATORY (INHALATION) at 19:05

## 2019-09-16 RX ADMIN — Medication SCH MMU: at 20:27

## 2019-09-16 RX ADMIN — HYDROCODONE BITARTRATE AND ACETAMINOPHEN SCH TAB: 10; 325 TABLET ORAL at 07:32

## 2019-09-16 RX ADMIN — TAZOBACTAM SODIUM AND PIPERACILLIN SODIUM SCH MLS/HR: 375; 3 INJECTION, SOLUTION INTRAVENOUS at 01:27

## 2019-09-16 NOTE — NUR
Paged Dr. Garcia with critical labs:



PAGER ID:  [phone] 

MESSAGE:  RE: Fermin Alex 8090. Critical labs: WBC 68.3, Hgb 7.0, Hct 25.1, Platelets 
112. Thank you. Lydia [phone]

## 2019-09-16 NOTE — NUR
Problems reprioritized. Patient report given, questions answered & plan of care reviewed 
with Jadyn SWIFT. Patient stable at transfer of care.

## 2019-09-16 NOTE — NUR
Paged Dr. Garcia:



PAGER ID:  [phone] 

MESSAGE:  Fermin Alex 4071. Critical labs: Potassium 2.7, CO2 44.0. Thank you. Lydia [phone]

## 2019-09-16 NOTE — NUR
Problems reprioritized. Patient report given, questions answered & plan of care reviewed 
with Lydia SWIFT and Alice RN.

## 2019-09-16 NOTE — NUR
Patient in room PCU 3010. I have received report from Jadyn SWIFT  and had the opportunity to 
ask questions and assume patient care.  Patient asleep in bed.  Bi-pap on 55% FiO2.  All 
immediate needs met at this time.

## 2019-09-16 NOTE — NUR
Patient in room PCU 3010. I have received report from Jadyn SWIFT and had the opportunity to 
ask questions and assume patient care.

## 2019-09-16 NOTE — NUR
Administered 10mg PO Norco. Two patient identifier was used, "[employer] didn't save in 
computer.

## 2019-09-16 NOTE — NUR
Orientee documentation:

I have reviewed and agree with all interventions, assessments performed and documented by 
JENIFFER Jenkins.



Orientee Medication Administration:

For this medication-pass time frame, all medication were reviewed, dispensed, administered 
and documented per hospital policy by JENIFFER Jenkins.

## 2019-09-16 NOTE — NUR
Patient in room PCU 3010. I have received report from Lydia RN and Alice RN and had the 
opportunity to ask questions and assume patient care. Patient is resting, O2 sat. 95% on 
High flow NC.

## 2019-09-17 VITALS — SYSTOLIC BLOOD PRESSURE: 110 MMHG | DIASTOLIC BLOOD PRESSURE: 49 MMHG

## 2019-09-17 VITALS — SYSTOLIC BLOOD PRESSURE: 95 MMHG | DIASTOLIC BLOOD PRESSURE: 71 MMHG

## 2019-09-17 VITALS — DIASTOLIC BLOOD PRESSURE: 67 MMHG | SYSTOLIC BLOOD PRESSURE: 117 MMHG

## 2019-09-17 VITALS — DIASTOLIC BLOOD PRESSURE: 109 MMHG | SYSTOLIC BLOOD PRESSURE: 143 MMHG

## 2019-09-17 VITALS — DIASTOLIC BLOOD PRESSURE: 58 MMHG | SYSTOLIC BLOOD PRESSURE: 101 MMHG

## 2019-09-17 VITALS — DIASTOLIC BLOOD PRESSURE: 106 MMHG | SYSTOLIC BLOOD PRESSURE: 119 MMHG

## 2019-09-17 VITALS — DIASTOLIC BLOOD PRESSURE: 57 MMHG | SYSTOLIC BLOOD PRESSURE: 97 MMHG

## 2019-09-17 VITALS — DIASTOLIC BLOOD PRESSURE: 56 MMHG | SYSTOLIC BLOOD PRESSURE: 101 MMHG

## 2019-09-17 VITALS — SYSTOLIC BLOOD PRESSURE: 144 MMHG | DIASTOLIC BLOOD PRESSURE: 58 MMHG

## 2019-09-17 VITALS — SYSTOLIC BLOOD PRESSURE: 104 MMHG | DIASTOLIC BLOOD PRESSURE: 75 MMHG

## 2019-09-17 VITALS — SYSTOLIC BLOOD PRESSURE: 117 MMHG | DIASTOLIC BLOOD PRESSURE: 67 MMHG

## 2019-09-17 VITALS — DIASTOLIC BLOOD PRESSURE: 60 MMHG | SYSTOLIC BLOOD PRESSURE: 113 MMHG

## 2019-09-17 VITALS — DIASTOLIC BLOOD PRESSURE: 71 MMHG | SYSTOLIC BLOOD PRESSURE: 95 MMHG

## 2019-09-17 VITALS — SYSTOLIC BLOOD PRESSURE: 97 MMHG | DIASTOLIC BLOOD PRESSURE: 64 MMHG

## 2019-09-17 LAB
ALBUMIN SERPL BCP-MCNC: 3.2 G/DL (ref 3.4–5)
ALBUMIN/GLOB SERPL: 0.9 {RATIO} (ref 1.1–1.5)
ALP SERPL-CCNC: 80 IU/L (ref 46–116)
ALT SERPL W P-5'-P-CCNC: 15 U/L (ref 12–78)
ANION GAP SERPL CALCULATED.3IONS-SCNC: 6 MMOL/L (ref 8–16)
ANISOCYTOSIS BLD QL SMEAR: (no result)
APPEARANCE SPUN FLD: (no result)
AST SERPL W P-5'-P-CCNC: 16 U/L (ref 10–37)
BASOPHILS # BLD AUTO: 0.1 X10'3 (ref 0–0.2)
BASOPHILS NFR BLD AUTO: 0.1 % (ref 0–1)
BILIRUB SERPL-MCNC: 1.5 MG/DL (ref 0.1–1)
BLASTS NFR BLD MANUAL: 1 % (ref 0–0)
BUN SERPL-MCNC: 79 MG/DL (ref 7–18)
BUN/CREAT SERPL: 28.9 (ref 5.4–32)
CALCIUM SERPL-MCNC: 8.8 MG/DL (ref 8.5–10.1)
CHLORIDE SERPL-SCNC: 93 MMOL/L (ref 99–107)
CO2 SERPL-SCNC: 43.3 MMOL/L (ref 24–32)
COLOR FLD: (no result)
CREAT SERPL-MCNC: 2.73 MG/DL (ref 0.6–1.1)
EOSINOPHIL # BLD AUTO: 0.1 X10'3 (ref 0–0.9)
EOSINOPHIL NFR BLD AUTO: 0.1 % (ref 0–6)
EOSINOPHIL NFR FLD MANUAL: 1 %
ERYTHROCYTE [DISTWIDTH] IN BLOOD BY AUTOMATED COUNT: 19.7 % (ref 11.5–14.5)
GFR SERPL CREATININE-BSD FRML MDRD: 23 ML/MIN
GLUCOSE SERPL-MCNC: 93 MG/DL (ref 70–104)
HCT VFR BLD AUTO: 27.1 % (ref 42–52)
HGB BLD-MCNC: 7.5 G/DL (ref 14–17.9)
LG PLATELETS BLD QL SMEAR: (no result)
LYMPHOCYTES # BLD AUTO: 70.1 X10'3 (ref 1.1–4.8)
LYMPHOCYTES NFR BLD AUTO: 88 % (ref 21–51)
LYMPHOCYTES NFR BLD MANUAL: 90 % (ref 21–51)
LYMPHOCYTES NFR FLD MANUAL: 33 %
MAGNESIUM SERPL-MCNC: 1.7 MG/DL (ref 1.5–2.4)
MCH RBC QN AUTO: 24.8 PG (ref 27–31)
MCHC RBC AUTO-ENTMCNC: 27.7 G/DL (ref 33–36.5)
MCV RBC AUTO: 89.6 FL (ref 78–98)
MONOCYTES # BLD AUTO: 1.2 X10'3 (ref 0–0.9)
MONOCYTES NFR BLD AUTO: 1.5 % (ref 2–12)
MONOCYTES NFR BLD MANUAL: 2 % (ref 2–12)
MONOCYTES NFR FLD MANUAL: 2 %
NEUTROPHILS # BLD AUTO: 8.2 X10'3 (ref 1.8–7.7)
NEUTROPHILS NFR BLD AUTO: 10.3 % (ref 42–75)
NEUTROPHILS NFR FLD MANUAL: 64 %
NEUTS SEG NFR BLD MANUAL: 7 % (ref 42–75)
PHOSPHATE SERPL-MCNC: 4.6 MG/DL (ref 2.3–4.5)
PLATELET # BLD AUTO: 128 X10'3 (ref 140–440)
PLATELET BLD QL SMEAR: (no result)
PMV BLD AUTO: 8.8 FL (ref 7.4–10.4)
POTASSIUM SERPL-SCNC: 3.6 MMOL/L (ref 3.5–5.1)
PROT FLD-MCNC: 4 G/DL
PROT SERPL-MCNC: 6.6 G/DL (ref 6.4–8.2)
RBC # BLD AUTO: 3.03 X10'6 (ref 4.7–6.1)
RBC # FLD MANUAL: (no result) /CU MM
RBC MORPH BLD: (no result)
SMUDGE CELLS BLD QL SMEAR: (no result)
SODIUM SERPL-SCNC: 142 MMOL/L (ref 135–145)
SPECIMEN VOL FLD: 50 ML
STOMATOCYTES BLD QL SMEAR: (no result)
TOTAL CELLS COUNTED FLD: 100
WBC # BLD AUTO: 79.7 X10'3 (ref 4.5–11)
WBC # FLD MANUAL: 6150 /CU MM (ref 0–1000)

## 2019-09-17 PROCEDURE — 0W993ZZ DRAINAGE OF RIGHT PLEURAL CAVITY, PERCUTANEOUS APPROACH: ICD-10-PCS | Performed by: RADIOLOGY

## 2019-09-17 PROCEDURE — 5A09357 ASSISTANCE WITH RESPIRATORY VENTILATION, LESS THAN 24 CONSECUTIVE HOURS, CONTINUOUS POSITIVE AIRWAY PRESSURE: ICD-10-PCS | Performed by: FAMILY MEDICINE

## 2019-09-17 RX ADMIN — PANTOPRAZOLE SODIUM SCH MG: 40 TABLET, DELAYED RELEASE ORAL at 08:00

## 2019-09-17 RX ADMIN — IPRATROPIUM BROMIDE AND ALBUTEROL SULFATE SCH ML: .5; 3 SOLUTION RESPIRATORY (INHALATION) at 06:52

## 2019-09-17 RX ADMIN — HYDROCODONE BITARTRATE AND ACETAMINOPHEN SCH TAB: 10; 325 TABLET ORAL at 08:00

## 2019-09-17 RX ADMIN — HYDROCODONE BITARTRATE AND ACETAMINOPHEN SCH TAB: 10; 325 TABLET ORAL at 20:15

## 2019-09-17 RX ADMIN — CEFTRIAXONE SCH MLS/HR: 1 INJECTION, SOLUTION INTRAVENOUS at 09:23

## 2019-09-17 RX ADMIN — IPRATROPIUM BROMIDE AND ALBUTEROL SULFATE SCH ML: .5; 3 SOLUTION RESPIRATORY (INHALATION) at 18:44

## 2019-09-17 RX ADMIN — FLUTICASONE PROPIONATE SCH SPR: 50 SPRAY, METERED NASAL at 07:59

## 2019-09-17 RX ADMIN — HYDROCODONE BITARTRATE AND ACETAMINOPHEN SCH TAB: 10; 325 TABLET ORAL at 15:22

## 2019-09-17 RX ADMIN — IPRATROPIUM BROMIDE AND ALBUTEROL SULFATE SCH ML: .5; 3 SOLUTION RESPIRATORY (INHALATION) at 14:36

## 2019-09-17 RX ADMIN — BUDESONIDE SCH MG: 0.5 INHALANT RESPIRATORY (INHALATION) at 18:43

## 2019-09-17 RX ADMIN — PANTOPRAZOLE SODIUM SCH MG: 40 TABLET, DELAYED RELEASE ORAL at 20:11

## 2019-09-17 RX ADMIN — Medication SCH MMU: at 07:59

## 2019-09-17 RX ADMIN — Medication SCH MMU: at 20:10

## 2019-09-17 RX ADMIN — TAZOBACTAM SODIUM AND PIPERACILLIN SODIUM SCH MLS/HR: 375; 3 INJECTION, SOLUTION INTRAVENOUS at 00:32

## 2019-09-17 RX ADMIN — BUDESONIDE SCH MG: 0.5 INHALANT RESPIRATORY (INHALATION) at 06:53

## 2019-09-17 RX ADMIN — IPRATROPIUM BROMIDE AND ALBUTEROL SULFATE SCH ML: .5; 3 SOLUTION RESPIRATORY (INHALATION) at 23:27

## 2019-09-17 RX ADMIN — HYDROCODONE BITARTRATE AND ACETAMINOPHEN SCH TAB: 10; 325 TABLET ORAL at 02:00

## 2019-09-17 RX ADMIN — ALBUTEROL SULFATE SCH MG: 2.5 SOLUTION RESPIRATORY (INHALATION) at 03:35

## 2019-09-17 RX ADMIN — DOCUSATE SODIUM SCH MG: 100 CAPSULE, LIQUID FILLED ORAL at 20:12

## 2019-09-17 RX ADMIN — IPRATROPIUM BROMIDE AND ALBUTEROL SULFATE SCH ML: .5; 3 SOLUTION RESPIRATORY (INHALATION) at 10:35

## 2019-09-17 RX ADMIN — TAZOBACTAM SODIUM AND PIPERACILLIN SODIUM SCH MLS/HR: 375; 3 INJECTION, SOLUTION INTRAVENOUS at 07:58

## 2019-09-17 NOTE — NUR
Patient in room PCU 3010. I have received report from JENIFFER Salamanca and had the opportunity to 
ask questions and assume patient care.

## 2019-09-17 NOTE — NUR
Received critical CO2 (43.3). Let Dr. Spencer know about the lab result, instructed to pass 
it on to daytime nurse/ hospitalist.

## 2019-09-17 NOTE — NUR
Problems reprioritized. Patient report given, questions answered & plan of care reviewed 
with Jadyn SWIFT.

## 2019-09-17 NOTE — NUR
Problems reprioritized. Patient report given, questions answered & plan of care reviewed 
with JENIFFER Salamanca.

## 2019-09-17 NOTE — NUR
Problems reprioritized. Patient report given, questions answered & plan of care reviewed 
with Nilton SWIFT and Milady SWIFT.

## 2019-09-17 NOTE — NUR
PAGER ID:  [phone] 

MESSAGE:  Re: Fermin Alex, Room: 3010. Pt just received Thoracentesis. 560ml of bloody 
fluid removed. We have a sample of fluid, do you want to culture the fluid? -[facility] 
#[id number]



Dr. Whitehead paged concerning Thoracentesis performed on Pt.

## 2019-09-18 VITALS — DIASTOLIC BLOOD PRESSURE: 55 MMHG | SYSTOLIC BLOOD PRESSURE: 99 MMHG

## 2019-09-18 VITALS — SYSTOLIC BLOOD PRESSURE: 97 MMHG | DIASTOLIC BLOOD PRESSURE: 53 MMHG

## 2019-09-18 VITALS — SYSTOLIC BLOOD PRESSURE: 122 MMHG | DIASTOLIC BLOOD PRESSURE: 40 MMHG

## 2019-09-18 VITALS — DIASTOLIC BLOOD PRESSURE: 56 MMHG | SYSTOLIC BLOOD PRESSURE: 106 MMHG

## 2019-09-18 VITALS — SYSTOLIC BLOOD PRESSURE: 123 MMHG | DIASTOLIC BLOOD PRESSURE: 89 MMHG

## 2019-09-18 VITALS — SYSTOLIC BLOOD PRESSURE: 128 MMHG | DIASTOLIC BLOOD PRESSURE: 56 MMHG

## 2019-09-18 VITALS — DIASTOLIC BLOOD PRESSURE: 71 MMHG | SYSTOLIC BLOOD PRESSURE: 126 MMHG

## 2019-09-18 VITALS — DIASTOLIC BLOOD PRESSURE: 49 MMHG | SYSTOLIC BLOOD PRESSURE: 81 MMHG

## 2019-09-18 VITALS — SYSTOLIC BLOOD PRESSURE: 110 MMHG | DIASTOLIC BLOOD PRESSURE: 44 MMHG

## 2019-09-18 VITALS — DIASTOLIC BLOOD PRESSURE: 37 MMHG | SYSTOLIC BLOOD PRESSURE: 110 MMHG

## 2019-09-18 VITALS — SYSTOLIC BLOOD PRESSURE: 103 MMHG | DIASTOLIC BLOOD PRESSURE: 57 MMHG

## 2019-09-18 VITALS — SYSTOLIC BLOOD PRESSURE: 101 MMHG | DIASTOLIC BLOOD PRESSURE: 58 MMHG

## 2019-09-18 LAB
ALBUMIN SERPL BCP-MCNC: 3.3 G/DL (ref 3.4–5)
ALBUMIN/GLOB SERPL: 1 {RATIO} (ref 1.1–1.5)
ALP SERPL-CCNC: 92 IU/L (ref 46–116)
ALT SERPL W P-5'-P-CCNC: 19 U/L (ref 12–78)
ANION GAP SERPL CALCULATED.3IONS-SCNC: 5 MMOL/L (ref 8–16)
ANISOCYTOSIS BLD QL SMEAR: (no result)
AST SERPL W P-5'-P-CCNC: 24 U/L (ref 10–37)
BASE EXCESS BLDA CALC-SCNC: 18.2 MMOL/L (ref -2–3)
BASOPHILS # BLD AUTO: 0.1 X10'3 (ref 0–0.2)
BASOPHILS NFR BLD AUTO: 0.2 % (ref 0–1)
BILIRUB SERPL-MCNC: 1.3 MG/DL (ref 0.1–1)
BODY TEMPERATURE: 37
BUN SERPL-MCNC: 82 MG/DL (ref 7–18)
BUN/CREAT SERPL: 26.8 (ref 5.4–32)
CALCIUM SERPL-MCNC: 8.9 MG/DL (ref 8.5–10.1)
CHLORIDE SERPL-SCNC: 96 MMOL/L (ref 99–107)
CO2 SERPL-SCNC: 43.4 MMOL/L (ref 24–32)
COHGB MFR BLDA: 0.9 % (ref 0.5–1.5)
CREAT SERPL-MCNC: 3.06 MG/DL (ref 0.6–1.1)
EOSINOPHIL # BLD AUTO: 0 X10'3 (ref 0–0.9)
EOSINOPHIL NFR BLD AUTO: 0 % (ref 0–6)
EOSINOPHIL NFR BLD MANUAL: 1 % (ref 0–6)
ERYTHROCYTE [DISTWIDTH] IN BLOOD BY AUTOMATED COUNT: 19.6 % (ref 11.5–14.5)
GFR SERPL CREATININE-BSD FRML MDRD: 20 ML/MIN
GLUCOSE SERPL-MCNC: 106 MG/DL (ref 70–104)
HCO3 BLDA-SCNC: 46.5 MMOL/L (ref 22–26)
HCT VFR BLD AUTO: 27.6 % (ref 42–52)
HGB BLD-MCNC: 7.7 G/DL (ref 14–17.9)
HGB BLDA-MCNC: 8.8 G/DL (ref 14–17.9)
HYPOCHROMIA BLD QL SMEAR: (no result)
LYMPHOCYTES # BLD AUTO: 49.3 X10'3 (ref 1.1–4.8)
LYMPHOCYTES NFR BLD AUTO: 84.2 % (ref 21–51)
LYMPHOCYTES NFR BLD MANUAL: 92 % (ref 21–51)
MAGNESIUM SERPL-MCNC: 2.2 MG/DL (ref 1.5–2.4)
MCH RBC QN AUTO: 24.8 PG (ref 27–31)
MCHC RBC AUTO-ENTMCNC: 27.7 G/DL (ref 33–36.5)
MCV RBC AUTO: 89.4 FL (ref 78–98)
METHGB MFR BLDA: 0.2 % (ref 0.3–1.12)
MONOCYTES # BLD AUTO: 0.8 X10'3 (ref 0–0.9)
MONOCYTES NFR BLD AUTO: 1.4 % (ref 2–12)
MONOCYTES NFR BLD MANUAL: 1 % (ref 2–12)
NEUTROPHILS # BLD AUTO: 8.3 X10'3 (ref 1.8–7.7)
NEUTROPHILS NFR BLD AUTO: 14.2 % (ref 42–75)
NEUTS SEG NFR BLD MANUAL: 6 % (ref 42–75)
NRBC BLD MANUAL-RTO: 1 /100WBC (ref 0–0)
O2/TOTAL GAS SETTING VFR VENT: 50 MMHG/%
OXYHGB MFR BLDA: 84.9 % (ref 94–100)
PCO2 TEMP ADJ BLDA: 86 MMHG (ref 35–45)
PH TEMP ADJ BLDA: 7.35 [PH] (ref 7.35–7.45)
PHOSPHATE SERPL-MCNC: 4.8 MG/DL (ref 2.3–4.5)
PLATELET # BLD AUTO: 119 X10'3 (ref 140–440)
PLATELET BLD QL SMEAR: (no result)
PMV BLD AUTO: 9 FL (ref 7.4–10.4)
PO2 TEMP ADJ BLD: 55.7 MMHG (ref 83–108)
POTASSIUM SERPL-SCNC: 3.7 MMOL/L (ref 3.5–5.1)
PROT SERPL-MCNC: 6.6 G/DL (ref 6.4–8.2)
RBC # BLD AUTO: 3.09 X10'6 (ref 4.7–6.1)
RBC MORPH BLD: (no result)
RESP RATE 1H: 20 B/MIN
RESP RATE RESTING: 22 B/MIN
SAO2 % BLDA: 85.8 % (ref 95–98)
SMUDGE CELLS BLD QL SMEAR: (no result)
SODIUM SERPL-SCNC: 144 MMOL/L (ref 135–145)
STOMATOCYTES BLD QL SMEAR: (no result)
TOTAL CELLS COUNTED FLD: 100
VOL.EXP/M/BW ON VENT: 12 L/MIN
WBC # BLD AUTO: 58.6 X10'3 (ref 4.5–11)

## 2019-09-18 PROCEDURE — 5A09457 ASSISTANCE WITH RESPIRATORY VENTILATION, 24-96 CONSECUTIVE HOURS, CONTINUOUS POSITIVE AIRWAY PRESSURE: ICD-10-PCS | Performed by: FAMILY MEDICINE

## 2019-09-18 RX ADMIN — IPRATROPIUM BROMIDE AND ALBUTEROL SULFATE SCH ML: .5; 3 SOLUTION RESPIRATORY (INHALATION) at 14:29

## 2019-09-18 RX ADMIN — PANTOPRAZOLE SODIUM SCH MG: 40 TABLET, DELAYED RELEASE ORAL at 21:07

## 2019-09-18 RX ADMIN — FUROSEMIDE SCH MLS/HR: 10 INJECTION, SOLUTION INTRAMUSCULAR; INTRAVENOUS at 15:04

## 2019-09-18 RX ADMIN — IPRATROPIUM BROMIDE AND ALBUTEROL SULFATE SCH ML: .5; 3 SOLUTION RESPIRATORY (INHALATION) at 23:35

## 2019-09-18 RX ADMIN — FUROSEMIDE SCH MLS/HR: 10 INJECTION, SOLUTION INTRAMUSCULAR; INTRAVENOUS at 22:24

## 2019-09-18 RX ADMIN — Medication SCH MMU: at 21:06

## 2019-09-18 RX ADMIN — IPRATROPIUM BROMIDE AND ALBUTEROL SULFATE SCH ML: .5; 3 SOLUTION RESPIRATORY (INHALATION) at 12:15

## 2019-09-18 RX ADMIN — POTASSIUM CHLORIDE PRN MEQ: 1500 TABLET, EXTENDED RELEASE ORAL at 21:06

## 2019-09-18 RX ADMIN — HYDROCODONE BITARTRATE AND ACETAMINOPHEN SCH TAB: 10; 325 TABLET ORAL at 21:09

## 2019-09-18 RX ADMIN — CEFTRIAXONE SCH MLS/HR: 1 INJECTION, SOLUTION INTRAVENOUS at 07:56

## 2019-09-18 RX ADMIN — HYDROCODONE BITARTRATE AND ACETAMINOPHEN SCH TAB: 10; 325 TABLET ORAL at 02:00

## 2019-09-18 RX ADMIN — BUDESONIDE SCH MG: 0.5 INHALANT RESPIRATORY (INHALATION) at 19:28

## 2019-09-18 RX ADMIN — IPRATROPIUM BROMIDE AND ALBUTEROL SULFATE SCH ML: .5; 3 SOLUTION RESPIRATORY (INHALATION) at 19:28

## 2019-09-18 RX ADMIN — HYDROCODONE BITARTRATE AND ACETAMINOPHEN SCH TAB: 10; 325 TABLET ORAL at 14:00

## 2019-09-18 RX ADMIN — BUDESONIDE SCH MG: 0.5 INHALANT RESPIRATORY (INHALATION) at 10:19

## 2019-09-18 RX ADMIN — PANTOPRAZOLE SODIUM SCH MG: 40 TABLET, DELAYED RELEASE ORAL at 07:58

## 2019-09-18 RX ADMIN — IPRATROPIUM BROMIDE AND ALBUTEROL SULFATE SCH ML: .5; 3 SOLUTION RESPIRATORY (INHALATION) at 07:59

## 2019-09-18 RX ADMIN — FLUTICASONE PROPIONATE SCH SPR: 50 SPRAY, METERED NASAL at 07:56

## 2019-09-18 RX ADMIN — Medication SCH MMU: at 07:56

## 2019-09-18 RX ADMIN — HYDROCODONE BITARTRATE AND ACETAMINOPHEN SCH TAB: 10; 325 TABLET ORAL at 07:57

## 2019-09-18 RX ADMIN — DOCUSATE SODIUM SCH MG: 100 CAPSULE, LIQUID FILLED ORAL at 21:06

## 2019-09-18 NOTE — NUR
Message to Dr. Spencer -   Mr. Alex RM 3010 - ABG back. CO2 is 86.00 MrTenisha Thank you, 
Destiny, [facility] ext [phone]

## 2019-09-18 NOTE — NUR
Problems reprioritized. Patient report given, questions answered & plan of care reviewed 
with JENIFFER Fraga.

## 2019-09-18 NOTE — NUR
Dr. Spencer advised of Lasix and Coreg being held due to BP of 81/49.  Also advised that 
patient has order to use no more than 3L/NC and he is currently on high flow at 10 L.  No 
additional orders received.

## 2019-09-18 NOTE — NUR
Initial: Pt admit w/ increasing SOB confused AOx3. DX severe pulmonary 

HTN, afib, CHF, CLL hx, and PNA. S/p R thoracentesis 550ml bloody fluid 

per report. PO 50-75% avg meals decent PO; ensure pudding TIDWM added 

given SOB and hx for additional needs. LBM 9/17 receiving routine bowel 

care. Lasix held today for low bp. Will continue to monitor for additional 

protein needs this admit. Per MD note comfort measures were also discussed 

w/ SO; will monitor for any changes in code status. 

Rec:

1. continue heart healthy/fluid-restricted diet

2. ensure pudding TIDWM

3. wt per rx

-------------------------------------------------------------------------------

Addendum: 09/18/19 at 1633 by Iván Bearden RD

-------------------------------------------------------------------------------

Amended: Links added.

## 2019-09-18 NOTE — NUR
Patient in room PCU 3010. I have received report from Destiny SWIFT and had the opportunity to 
ask questions and assume patient care.

## 2019-09-18 NOTE — NUR
Problems reprioritized. Patient report given, questions answered & plan of care reviewed 
with Destiny SWIFT.

## 2019-09-18 NOTE — NUR
Patient very lethargic.  Is barely maintaining 88-89% saturation on 10 L/high flow.  Message 
sent to Dr. Tj Alex, RM 3010. I had to put him back on Bipap. He was barely 
maintaining 88-89% saturation on 10 L/ low flow Justa Dixon, U EXT [phone] Thanks Is This A New Presentation, Or A Follow-Up?: Acne Additional Comments (Use Complete Sentences): She has using Neutrogena Salicyckic wash, BP wash, no improvement.  She is currently using Oil of Olay. She discontinued applying it to the forehead. Currently using Neutrogena Oil Free Acne Wash.  Clippings from Aloe Vera plant has improved redness.

## 2019-09-19 VITALS — DIASTOLIC BLOOD PRESSURE: 34 MMHG | SYSTOLIC BLOOD PRESSURE: 99 MMHG

## 2019-09-19 VITALS — DIASTOLIC BLOOD PRESSURE: 57 MMHG | SYSTOLIC BLOOD PRESSURE: 112 MMHG

## 2019-09-19 VITALS — DIASTOLIC BLOOD PRESSURE: 52 MMHG | SYSTOLIC BLOOD PRESSURE: 109 MMHG

## 2019-09-19 VITALS — DIASTOLIC BLOOD PRESSURE: 34 MMHG | SYSTOLIC BLOOD PRESSURE: 103 MMHG

## 2019-09-19 VITALS — SYSTOLIC BLOOD PRESSURE: 130 MMHG | DIASTOLIC BLOOD PRESSURE: 57 MMHG

## 2019-09-19 VITALS — DIASTOLIC BLOOD PRESSURE: 43 MMHG | SYSTOLIC BLOOD PRESSURE: 93 MMHG

## 2019-09-19 VITALS — DIASTOLIC BLOOD PRESSURE: 33 MMHG | SYSTOLIC BLOOD PRESSURE: 107 MMHG

## 2019-09-19 VITALS — DIASTOLIC BLOOD PRESSURE: 49 MMHG | SYSTOLIC BLOOD PRESSURE: 120 MMHG

## 2019-09-19 VITALS — SYSTOLIC BLOOD PRESSURE: 103 MMHG | DIASTOLIC BLOOD PRESSURE: 44 MMHG

## 2019-09-19 VITALS — SYSTOLIC BLOOD PRESSURE: 109 MMHG | DIASTOLIC BLOOD PRESSURE: 54 MMHG

## 2019-09-19 VITALS — SYSTOLIC BLOOD PRESSURE: 105 MMHG | DIASTOLIC BLOOD PRESSURE: 60 MMHG

## 2019-09-19 VITALS — DIASTOLIC BLOOD PRESSURE: 49 MMHG | SYSTOLIC BLOOD PRESSURE: 108 MMHG

## 2019-09-19 VITALS — DIASTOLIC BLOOD PRESSURE: 78 MMHG | SYSTOLIC BLOOD PRESSURE: 100 MMHG

## 2019-09-19 VITALS — DIASTOLIC BLOOD PRESSURE: 56 MMHG | SYSTOLIC BLOOD PRESSURE: 117 MMHG

## 2019-09-19 VITALS — SYSTOLIC BLOOD PRESSURE: 104 MMHG | DIASTOLIC BLOOD PRESSURE: 46 MMHG

## 2019-09-19 LAB
ALBUMIN SERPL BCP-MCNC: 2.9 G/DL (ref 3.4–5)
ALBUMIN/GLOB SERPL: 0.9 {RATIO} (ref 1.1–1.5)
ALP SERPL-CCNC: 90 IU/L (ref 46–116)
ALT SERPL W P-5'-P-CCNC: 15 U/L (ref 12–78)
ANION GAP SERPL CALCULATED.3IONS-SCNC: 6 MMOL/L (ref 8–16)
ANISOCYTOSIS BLD QL SMEAR: (no result)
AST SERPL W P-5'-P-CCNC: 20 U/L (ref 10–37)
BASE EXCESS BLDA CALC-SCNC: 17.1 MMOL/L (ref -2–3)
BASOPHILS # BLD AUTO: 0 X10'3 (ref 0–0.2)
BASOPHILS NFR BLD AUTO: 0 % (ref 0–1)
BILIRUB SERPL-MCNC: 1 MG/DL (ref 0.1–1)
BODY TEMPERATURE: 37
BUN SERPL-MCNC: 84 MG/DL (ref 7–18)
BUN/CREAT SERPL: 29 (ref 5.4–32)
CALCIUM SERPL-MCNC: 9 MG/DL (ref 8.5–10.1)
CHLORIDE SERPL-SCNC: 95 MMOL/L (ref 99–107)
CO2 SERPL-SCNC: 44.4 MMOL/L (ref 24–32)
COHGB MFR BLDA: 0.9 % (ref 0.5–1.5)
CREAT SERPL-MCNC: 2.9 MG/DL (ref 0.6–1.1)
EOSINOPHIL # BLD AUTO: 0.2 X10'3 (ref 0–0.9)
EOSINOPHIL NFR BLD AUTO: 0.3 % (ref 0–6)
ERYTHROCYTE [DISTWIDTH] IN BLOOD BY AUTOMATED COUNT: 19.5 % (ref 11.5–14.5)
GFR SERPL CREATININE-BSD FRML MDRD: 22 ML/MIN
GLUCOSE SERPL-MCNC: 78 MG/DL (ref 70–104)
HCO3 BLDA-SCNC: 43.3 MMOL/L (ref 22–26)
HCT VFR BLD AUTO: 25.2 % (ref 42–52)
HGB BLD-MCNC: 7.1 G/DL (ref 14–17.9)
HGB BLDA-MCNC: 7.8 G/DL (ref 14–17.9)
HYPOCHROMIA BLD QL SMEAR: (no result)
LYMPHOCYTES # BLD AUTO: 55.6 X10'3 (ref 1.1–4.8)
LYMPHOCYTES NFR BLD AUTO: 88.3 % (ref 21–51)
LYMPHOCYTES NFR BLD MANUAL: 89 % (ref 21–51)
MAGNESIUM SERPL-MCNC: 2.1 MG/DL (ref 1.5–2.4)
MCH RBC QN AUTO: 24.9 PG (ref 27–31)
MCHC RBC AUTO-ENTMCNC: 28.2 G/DL (ref 33–36.5)
MCV RBC AUTO: 88.4 FL (ref 78–98)
METHGB MFR BLDA: 0.2 % (ref 0.3–1.12)
MONOCYTES # BLD AUTO: 0.8 X10'3 (ref 0–0.9)
MONOCYTES NFR BLD AUTO: 1.2 % (ref 2–12)
MONOCYTES NFR BLD MANUAL: 2 % (ref 2–12)
NEUTROPHILS # BLD AUTO: 6.4 X10'3 (ref 1.8–7.7)
NEUTROPHILS NFR BLD AUTO: 10.2 % (ref 42–75)
NEUTS SEG NFR BLD MANUAL: 9 % (ref 42–75)
O2/TOTAL GAS SETTING VFR VENT: 50 MMHG/%
OXYHGB MFR BLDA: 90 % (ref 94–100)
PCO2 TEMP ADJ BLDA: 65.7 MMHG (ref 35–45)
PH TEMP ADJ BLDA: 7.44 [PH] (ref 7.35–7.45)
PHOSPHATE SERPL-MCNC: 3 MG/DL (ref 2.3–4.5)
PLATELET # BLD AUTO: 117 X10'3 (ref 140–440)
PLATELET BLD QL SMEAR: (no result)
PMV BLD AUTO: 8.9 FL (ref 7.4–10.4)
PO2 TEMP ADJ BLD: 62 MMHG (ref 83–108)
POTASSIUM SERPL-SCNC: 3.3 MMOL/L (ref 3.5–5.1)
PROT SERPL-MCNC: 6 G/DL (ref 6.4–8.2)
RBC # BLD AUTO: 2.85 X10'6 (ref 4.7–6.1)
RBC MORPH BLD: (no result)
RESP RATE 1H: 20 B/MIN
RESP RATE RESTING: 23 B/MIN
SAO2 % BLDA: 91 % (ref 95–98)
SMUDGE CELLS BLD QL SMEAR: (no result)
SODIUM SERPL-SCNC: 145 MMOL/L (ref 135–145)
STOMATOCYTES BLD QL SMEAR: (no result)
TOTAL CELLS COUNTED FLD: 100
VOL.EXP/M/BW ON VENT: 18 L/MIN
WBC # BLD AUTO: 63 X10'3 (ref 4.5–11)

## 2019-09-19 RX ADMIN — IPRATROPIUM BROMIDE AND ALBUTEROL SULFATE SCH ML: .5; 3 SOLUTION RESPIRATORY (INHALATION) at 23:51

## 2019-09-19 RX ADMIN — Medication SCH MMU: at 08:34

## 2019-09-19 RX ADMIN — PANTOPRAZOLE SODIUM SCH MG: 40 TABLET, DELAYED RELEASE ORAL at 19:58

## 2019-09-19 RX ADMIN — HYDROCODONE BITARTRATE AND ACETAMINOPHEN SCH TAB: 10; 325 TABLET ORAL at 02:00

## 2019-09-19 RX ADMIN — POTASSIUM CHLORIDE PRN MEQ: 1500 TABLET, EXTENDED RELEASE ORAL at 08:33

## 2019-09-19 RX ADMIN — POTASSIUM CHLORIDE PRN MEQ: 1500 TABLET, EXTENDED RELEASE ORAL at 19:59

## 2019-09-19 RX ADMIN — IPRATROPIUM BROMIDE AND ALBUTEROL SULFATE SCH ML: .5; 3 SOLUTION RESPIRATORY (INHALATION) at 11:29

## 2019-09-19 RX ADMIN — FLUTICASONE PROPIONATE SCH SPR: 50 SPRAY, METERED NASAL at 08:33

## 2019-09-19 RX ADMIN — CEFTRIAXONE SCH MLS/HR: 1 INJECTION, SOLUTION INTRAVENOUS at 10:54

## 2019-09-19 RX ADMIN — FUROSEMIDE SCH MLS/HR: 10 INJECTION, SOLUTION INTRAMUSCULAR; INTRAVENOUS at 08:32

## 2019-09-19 RX ADMIN — HYDROCODONE BITARTRATE AND ACETAMINOPHEN SCH TAB: 10; 325 TABLET ORAL at 08:39

## 2019-09-19 RX ADMIN — POTASSIUM CHLORIDE PRN MEQ: 1500 TABLET, EXTENDED RELEASE ORAL at 13:41

## 2019-09-19 RX ADMIN — IPRATROPIUM BROMIDE AND ALBUTEROL SULFATE SCH ML: .5; 3 SOLUTION RESPIRATORY (INHALATION) at 06:58

## 2019-09-19 RX ADMIN — Medication SCH MMU: at 19:56

## 2019-09-19 RX ADMIN — IPRATROPIUM BROMIDE AND ALBUTEROL SULFATE SCH ML: .5; 3 SOLUTION RESPIRATORY (INHALATION) at 15:51

## 2019-09-19 RX ADMIN — BUDESONIDE SCH MG: 0.5 INHALANT RESPIRATORY (INHALATION) at 20:16

## 2019-09-19 RX ADMIN — PANTOPRAZOLE SODIUM SCH MG: 40 TABLET, DELAYED RELEASE ORAL at 08:34

## 2019-09-19 RX ADMIN — DOBUTAMINE IN DEXTROSE SCH MLS/HR: 200 INJECTION, SOLUTION INTRAVENOUS at 08:31

## 2019-09-19 RX ADMIN — HYDROCODONE BITARTRATE AND ACETAMINOPHEN SCH TAB: 10; 325 TABLET ORAL at 19:58

## 2019-09-19 RX ADMIN — HYDROCODONE BITARTRATE AND ACETAMINOPHEN SCH TAB: 10; 325 TABLET ORAL at 20:58

## 2019-09-19 RX ADMIN — HYDROCODONE BITARTRATE AND ACETAMINOPHEN SCH TAB: 10; 325 TABLET ORAL at 13:42

## 2019-09-19 RX ADMIN — FUROSEMIDE SCH MLS/HR: 10 INJECTION, SOLUTION INTRAMUSCULAR; INTRAVENOUS at 19:53

## 2019-09-19 RX ADMIN — DOCUSATE SODIUM SCH MG: 100 CAPSULE, LIQUID FILLED ORAL at 20:00

## 2019-09-19 RX ADMIN — IPRATROPIUM BROMIDE AND ALBUTEROL SULFATE SCH ML: .5; 3 SOLUTION RESPIRATORY (INHALATION) at 20:16

## 2019-09-19 RX ADMIN — BUDESONIDE SCH MG: 0.5 INHALANT RESPIRATORY (INHALATION) at 06:58

## 2019-09-19 NOTE — NUR
PAGER ID:  [phone] 

MESSAGE:   [phone] Alessandro Alex: Wife is in the room. JENIFFER Dominguez Ext [phone]

## 2019-09-19 NOTE — NUR
Problems reprioritized. Patient report given, questions answered & plan of care reviewed 
with Alberto SWIFT.

## 2019-09-19 NOTE — NUR
Problems reprioritized. Patient report given, questions answered & plan of care reviewed 
with JENIFFER Elise.

## 2019-09-19 NOTE — NUR
PRESSURE ULCER EDUCATION:



DEFINITION:  A pressure ulcer is an area of skin that breaks down when you stay in one 
position too long. The constant pressure against the skin reduces the blood flow to that 
area and the affected tissue dies.



CAUSES:

"Being bedridden or in a wheelchair

"Fragile skin

"Having a chronic condition, such as diabetes or vascular disease

"Inability to move certain parts of your body without assistance

"Older age

"Incontinence of urine or stool



SYMPTOMS:

"A reddened area that DOES NOT turn white when pressed on - this can be the beginning of a 
pressure ulcer

"A blister, deep sore or a crater - these can be advanced pressure ulcers



FIRST AID:

"Relieve the pressure on this area

"Keep the area clean and dry

"Call your primary doctor if you see any of the above symptoms

"DO NOT massage the area

"DO NOT use a donut shaped or ring shaped pillow- these actually interfere with the blood 
flow and cause complications



PREVENTION:

"Check for pressure ulcers everyday

"Change position at least every two hours to relieve pressure

"Use items that help relieve pressure- pillows, sheepskin, foam padding, and powders.

"Keep skin clean and dry

"Eat healthy well balanced meals

"Exercise daily



IF YOU SEE ANY OF THESE SYMPTOMS WHILE IN THE HOSPITAL - TELL YOUR NURSE IMMEDIATELY.



IF YOU SEE ANY OF THESE SYMPTOMS WHILE AT HOME OR HAVE ANY QUESTIONS OR CONCERNS  ABOUT 
PRESSURE ULCERS - CALL YOUR PRIMARY DOCTOR IMMEDIATELY.


-------------------------------------------------------------------------------

Addendum: 09/19/19 at 1519 by Crys Blevins RN

-------------------------------------------------------------------------------

Amended: Links added.

## 2019-09-19 NOTE — NUR
Patients BP decreased at 103/34 (52) rechecked 104/46(60), MAP hanging around mid 50's-low 
60's throughout night. Patient easily arousable and asymptomatic. Lasix at 10ml/hr and  
at 3mcg/hr. Dr. lE notified. No new orders at this time, will continue to monitor.

## 2019-09-19 NOTE — NUR
PAGER ID: [phone]

MESSAGE: RM 3014 Alessandro Alex was unable to tolerate being off Bi-pap. He dropped his 
oxygen saturation to the 60's on 2L NC. JENIFFER Dominguez ext [phone]

## 2019-09-19 NOTE — NUR
PAGER ID: [phone]

MESSAGE: RM 3010 Alessandro Alex: Respiratory put him on 8L. Are you fine with that or do you 
want hm back on back on Bi-pap. I saw orders said not to go above 3L. JENIFFER Dominguez Ext [phone]

## 2019-09-19 NOTE — NUR
Patient in room PCU 3010. I have received report from JENIFFER Fraga   and had the 
opportunity to ask questions and assume patient care.

## 2019-09-19 NOTE — NUR
PAGER ID: [phone]

MESSAGE: RM 3010 Alessandro Alex: CBC at 63, and venous CO2 at 44.4. JENIFFER Dominguez Ext [phone]

## 2019-09-20 VITALS — SYSTOLIC BLOOD PRESSURE: 113 MMHG | DIASTOLIC BLOOD PRESSURE: 69 MMHG

## 2019-09-20 VITALS — SYSTOLIC BLOOD PRESSURE: 115 MMHG | DIASTOLIC BLOOD PRESSURE: 69 MMHG

## 2019-09-20 VITALS — SYSTOLIC BLOOD PRESSURE: 121 MMHG | DIASTOLIC BLOOD PRESSURE: 63 MMHG

## 2019-09-20 VITALS — DIASTOLIC BLOOD PRESSURE: 79 MMHG | SYSTOLIC BLOOD PRESSURE: 117 MMHG

## 2019-09-20 VITALS — SYSTOLIC BLOOD PRESSURE: 118 MMHG | DIASTOLIC BLOOD PRESSURE: 66 MMHG

## 2019-09-20 VITALS — DIASTOLIC BLOOD PRESSURE: 66 MMHG | SYSTOLIC BLOOD PRESSURE: 107 MMHG

## 2019-09-20 VITALS — SYSTOLIC BLOOD PRESSURE: 103 MMHG | DIASTOLIC BLOOD PRESSURE: 47 MMHG

## 2019-09-20 VITALS — DIASTOLIC BLOOD PRESSURE: 41 MMHG | SYSTOLIC BLOOD PRESSURE: 110 MMHG

## 2019-09-20 VITALS — DIASTOLIC BLOOD PRESSURE: 43 MMHG | SYSTOLIC BLOOD PRESSURE: 113 MMHG

## 2019-09-20 VITALS — DIASTOLIC BLOOD PRESSURE: 55 MMHG | SYSTOLIC BLOOD PRESSURE: 125 MMHG

## 2019-09-20 VITALS — DIASTOLIC BLOOD PRESSURE: 41 MMHG | SYSTOLIC BLOOD PRESSURE: 126 MMHG

## 2019-09-20 VITALS — SYSTOLIC BLOOD PRESSURE: 108 MMHG | DIASTOLIC BLOOD PRESSURE: 47 MMHG

## 2019-09-20 VITALS — SYSTOLIC BLOOD PRESSURE: 113 MMHG | DIASTOLIC BLOOD PRESSURE: 68 MMHG

## 2019-09-20 LAB
ALBUMIN SERPL BCP-MCNC: 2.8 G/DL (ref 3.4–5)
ANION GAP SERPL CALCULATED.3IONS-SCNC: 5 MMOL/L (ref 8–16)
ANISOCYTOSIS BLD QL SMEAR: (no result)
BASOPHILS # BLD AUTO: 0.2 X10'3 (ref 0–0.2)
BASOPHILS NFR BLD AUTO: 0.2 % (ref 0–1)
BUN SERPL-MCNC: 68 MG/DL (ref 7–18)
BUN/CREAT SERPL: 30 (ref 5.4–32)
CALCIUM SERPL-MCNC: 8.5 MG/DL (ref 8.5–10.1)
CHLORIDE SERPL-SCNC: 98 MMOL/L (ref 99–107)
CO2 SERPL-SCNC: 42.4 MMOL/L (ref 24–32)
CREAT SERPL-MCNC: 2.27 MG/DL (ref 0.6–1.1)
EOSINOPHIL # BLD AUTO: 0.1 X10'3 (ref 0–0.9)
EOSINOPHIL NFR BLD AUTO: 0.1 % (ref 0–6)
ERYTHROCYTE [DISTWIDTH] IN BLOOD BY AUTOMATED COUNT: 19.9 % (ref 11.5–14.5)
ERYTHROCYTE [DISTWIDTH] IN BLOOD BY AUTOMATED COUNT: 20.1 % (ref 11.5–14.5)
GFR SERPL CREATININE-BSD FRML MDRD: 29 ML/MIN
GLUCOSE SERPL-MCNC: 96 MG/DL (ref 70–104)
HCT VFR BLD AUTO: 23.2 % (ref 42–52)
HCT VFR BLD AUTO: 29 % (ref 42–52)
HGB BLD-MCNC: 6.5 G/DL (ref 14–17.9)
HGB BLD-MCNC: 8 G/DL (ref 14–17.9)
HYPOCHROMIA BLD QL SMEAR: (no result)
LYMPHOCYTES # BLD AUTO: 61.3 X10'3 (ref 1.1–4.8)
LYMPHOCYTES NFR BLD AUTO: 91.6 % (ref 21–51)
LYMPHOCYTES NFR BLD MANUAL: 95 % (ref 21–51)
MAGNESIUM SERPL-MCNC: 1.9 MG/DL (ref 1.5–2.4)
MCH RBC QN AUTO: 24.9 PG (ref 27–31)
MCH RBC QN AUTO: 25.1 PG (ref 27–31)
MCHC RBC AUTO-ENTMCNC: 27.8 G/DL (ref 33–36.5)
MCHC RBC AUTO-ENTMCNC: 28 G/DL (ref 33–36.5)
MCV RBC AUTO: 89 FL (ref 78–98)
MCV RBC AUTO: 90.2 FL (ref 78–98)
MONOCYTES # BLD AUTO: 0.6 X10'3 (ref 0–0.9)
MONOCYTES NFR BLD AUTO: 0.9 % (ref 2–12)
MONOCYTES NFR BLD MANUAL: 1 % (ref 2–12)
NEUTROPHILS # BLD AUTO: 4.8 X10'3 (ref 1.8–7.7)
NEUTROPHILS NFR BLD AUTO: 7.2 % (ref 42–75)
NEUTS SEG NFR BLD MANUAL: 4 % (ref 42–75)
PLATELET # BLD AUTO: 105 X10'3 (ref 140–440)
PLATELET # BLD AUTO: 90 X10'3 (ref 140–440)
PLATELET BLD QL SMEAR: (no result)
PMV BLD AUTO: 8.3 FL (ref 7.4–10.4)
PMV BLD AUTO: 8.4 FL (ref 7.4–10.4)
POLYCHROMASIA BLD QL SMEAR: (no result)
POTASSIUM SERPL-SCNC: 3 MMOL/L (ref 3.5–5.1)
RBC # BLD AUTO: 2.61 X10'6 (ref 4.7–6.1)
RBC # BLD AUTO: 3.21 X10'6 (ref 4.7–6.1)
RBC MORPH BLD: (no result)
SODIUM SERPL-SCNC: 145 MMOL/L (ref 135–145)
TOTAL CELLS COUNTED FLD: 100
WBC # BLD AUTO: 66.9 X10'3 (ref 4.5–11)
WBC # BLD AUTO: 82.7 X10'3 (ref 4.5–11)

## 2019-09-20 RX ADMIN — FLUTICASONE PROPIONATE SCH SPR: 50 SPRAY, METERED NASAL at 07:20

## 2019-09-20 RX ADMIN — HYDROCODONE BITARTRATE AND ACETAMINOPHEN SCH TAB: 10; 325 TABLET ORAL at 07:22

## 2019-09-20 RX ADMIN — ONDANSETRON PRN MG: 2 INJECTION, SOLUTION INTRAMUSCULAR; INTRAVENOUS at 21:40

## 2019-09-20 RX ADMIN — IPRATROPIUM BROMIDE AND ALBUTEROL SULFATE SCH ML: .5; 3 SOLUTION RESPIRATORY (INHALATION) at 06:51

## 2019-09-20 RX ADMIN — CEFTRIAXONE SCH MLS/HR: 1 INJECTION, SOLUTION INTRAVENOUS at 08:09

## 2019-09-20 RX ADMIN — BUDESONIDE SCH MG: 0.5 INHALANT RESPIRATORY (INHALATION) at 09:00

## 2019-09-20 RX ADMIN — Medication SCH MMU: at 07:21

## 2019-09-20 RX ADMIN — POTASSIUM CHLORIDE PRN MEQ: 1500 TABLET, EXTENDED RELEASE ORAL at 16:52

## 2019-09-20 RX ADMIN — HYDROCODONE BITARTRATE AND ACETAMINOPHEN SCH TAB: 10; 325 TABLET ORAL at 14:58

## 2019-09-20 RX ADMIN — FUROSEMIDE SCH MLS/HR: 10 INJECTION, SOLUTION INTRAMUSCULAR; INTRAVENOUS at 20:33

## 2019-09-20 RX ADMIN — DOBUTAMINE IN DEXTROSE SCH MLS/HR: 200 INJECTION, SOLUTION INTRAVENOUS at 01:25

## 2019-09-20 RX ADMIN — IPRATROPIUM BROMIDE AND ALBUTEROL SULFATE SCH ML: .5; 3 SOLUTION RESPIRATORY (INHALATION) at 19:36

## 2019-09-20 RX ADMIN — IPRATROPIUM BROMIDE AND ALBUTEROL SULFATE SCH ML: .5; 3 SOLUTION RESPIRATORY (INHALATION) at 11:50

## 2019-09-20 RX ADMIN — FUROSEMIDE SCH MLS/HR: 10 INJECTION, SOLUTION INTRAMUSCULAR; INTRAVENOUS at 15:50

## 2019-09-20 RX ADMIN — DOCUSATE SODIUM SCH MG: 100 CAPSULE, LIQUID FILLED ORAL at 21:51

## 2019-09-20 RX ADMIN — IPRATROPIUM BROMIDE AND ALBUTEROL SULFATE SCH ML: .5; 3 SOLUTION RESPIRATORY (INHALATION) at 16:05

## 2019-09-20 RX ADMIN — Medication SCH MMU: at 20:15

## 2019-09-20 RX ADMIN — BUDESONIDE SCH MG: 0.5 INHALANT RESPIRATORY (INHALATION) at 19:36

## 2019-09-20 RX ADMIN — PANTOPRAZOLE SODIUM SCH MG: 40 TABLET, DELAYED RELEASE ORAL at 20:16

## 2019-09-20 RX ADMIN — DOBUTAMINE IN DEXTROSE SCH MLS/HR: 200 INJECTION, SOLUTION INTRAVENOUS at 13:53

## 2019-09-20 RX ADMIN — FUROSEMIDE SCH MLS/HR: 10 INJECTION, SOLUTION INTRAMUSCULAR; INTRAVENOUS at 04:59

## 2019-09-20 RX ADMIN — IPRATROPIUM BROMIDE AND ALBUTEROL SULFATE SCH ML: .5; 3 SOLUTION RESPIRATORY (INHALATION) at 23:07

## 2019-09-20 RX ADMIN — PANTOPRAZOLE SODIUM SCH MG: 40 TABLET, DELAYED RELEASE ORAL at 07:22

## 2019-09-20 RX ADMIN — POTASSIUM CHLORIDE PRN MEQ: 1500 TABLET, EXTENDED RELEASE ORAL at 07:21

## 2019-09-20 RX ADMIN — HYDROCODONE BITARTRATE AND ACETAMINOPHEN SCH TAB: 10; 325 TABLET ORAL at 20:17

## 2019-09-20 RX ADMIN — POTASSIUM CHLORIDE PRN MEQ: 1500 TABLET, EXTENDED RELEASE ORAL at 12:41

## 2019-09-20 RX ADMIN — DOBUTAMINE IN DEXTROSE SCH MLS/HR: 200 INJECTION, SOLUTION INTRAVENOUS at 16:51

## 2019-09-20 RX ADMIN — HYDROCODONE BITARTRATE AND ACETAMINOPHEN SCH TAB: 10; 325 TABLET ORAL at 01:21

## 2019-09-20 NOTE — NUR
Patient in room PCU 3010. I have received report from Gosia SWIFT and had the opportunity to 
ask questions and assume patient care.

## 2019-09-20 NOTE — NUR
Problems reprioritized. Patient report given, questions answered & plan of care reviewed 
with sophie SWIFT.

## 2019-09-20 NOTE — NUR
PAGER ID: [phone]

MESSAGE: [id number] Alessandro Alex: The wife is in the room as of now. JENIFFER Dominguez Ext [phone]

## 2019-09-20 NOTE — NUR
Patient in room PCU 3010. I have received report from Alberto SWIFT and had the opportunity to 
ask questions and assume patient care.

## 2019-09-20 NOTE — NUR
Called Dr. El for critical H/H 6.5/23.2, MD wants to defer to daytime hospitalist 
since pt was never transfused before. RN will put in type and cross per protocol.

## 2019-09-20 NOTE — NUR
Received report from Gosia.  I have looked over all of her charting and agree with her and 
her orientee's charting.

## 2019-09-20 NOTE — NUR
Dr. Spencer telephoned regarding am hemoglobin of 6.5. Order given to repeat a Hemogram now 
and to transfuse this pt. w/1 unit of PRBC if hemoglobin is less than 7.0.

## 2019-09-20 NOTE — NUR
Problems reprioritized. Patient report given from ,Fe SWIFT orientee. questions answered & 
plan of care reviewed with .

## 2019-09-20 NOTE — NUR
Patient in room PCU 3010. I have received report from JENIFFER Miramontes   and had the opportunity 
to ask questions and assume patient care.

## 2019-09-21 VITALS — DIASTOLIC BLOOD PRESSURE: 107 MMHG | SYSTOLIC BLOOD PRESSURE: 146 MMHG

## 2019-09-21 VITALS — DIASTOLIC BLOOD PRESSURE: 67 MMHG | SYSTOLIC BLOOD PRESSURE: 114 MMHG

## 2019-09-21 VITALS — SYSTOLIC BLOOD PRESSURE: 115 MMHG | DIASTOLIC BLOOD PRESSURE: 66 MMHG

## 2019-09-21 VITALS — SYSTOLIC BLOOD PRESSURE: 114 MMHG | DIASTOLIC BLOOD PRESSURE: 60 MMHG

## 2019-09-21 VITALS — DIASTOLIC BLOOD PRESSURE: 53 MMHG | SYSTOLIC BLOOD PRESSURE: 114 MMHG

## 2019-09-21 VITALS — SYSTOLIC BLOOD PRESSURE: 132 MMHG | DIASTOLIC BLOOD PRESSURE: 70 MMHG

## 2019-09-21 VITALS — SYSTOLIC BLOOD PRESSURE: 115 MMHG | DIASTOLIC BLOOD PRESSURE: 60 MMHG

## 2019-09-21 VITALS — SYSTOLIC BLOOD PRESSURE: 116 MMHG | DIASTOLIC BLOOD PRESSURE: 58 MMHG

## 2019-09-21 VITALS — SYSTOLIC BLOOD PRESSURE: 107 MMHG | DIASTOLIC BLOOD PRESSURE: 58 MMHG

## 2019-09-21 VITALS — DIASTOLIC BLOOD PRESSURE: 60 MMHG | SYSTOLIC BLOOD PRESSURE: 113 MMHG

## 2019-09-21 VITALS — DIASTOLIC BLOOD PRESSURE: 59 MMHG | SYSTOLIC BLOOD PRESSURE: 119 MMHG

## 2019-09-21 VITALS — SYSTOLIC BLOOD PRESSURE: 124 MMHG | DIASTOLIC BLOOD PRESSURE: 81 MMHG

## 2019-09-21 VITALS — DIASTOLIC BLOOD PRESSURE: 59 MMHG | SYSTOLIC BLOOD PRESSURE: 100 MMHG

## 2019-09-21 VITALS — SYSTOLIC BLOOD PRESSURE: 126 MMHG | DIASTOLIC BLOOD PRESSURE: 67 MMHG

## 2019-09-21 LAB
ALBUMIN SERPL BCP-MCNC: 3.3 G/DL (ref 3.4–5)
ANION GAP SERPL CALCULATED.3IONS-SCNC: 4 MMOL/L (ref 8–16)
ANISOCYTOSIS BLD QL SMEAR: (no result)
BASOPHILS # BLD AUTO: 0.1 X10'3 (ref 0–0.2)
BASOPHILS NFR BLD AUTO: 0.1 % (ref 0–1)
BUN SERPL-MCNC: 60 MG/DL (ref 7–18)
BUN/CREAT SERPL: 28.6 (ref 5.4–32)
CALCIUM SERPL-MCNC: 9.3 MG/DL (ref 8.5–10.1)
CHLORIDE SERPL-SCNC: 98 MMOL/L (ref 99–107)
CO2 SERPL-SCNC: 45.7 MMOL/L (ref 24–32)
CREAT SERPL-MCNC: 2.1 MG/DL (ref 0.6–1.1)
EOSINOPHIL # BLD AUTO: 0.1 X10'3 (ref 0–0.9)
EOSINOPHIL NFR BLD AUTO: 0.2 % (ref 0–6)
ERYTHROCYTE [DISTWIDTH] IN BLOOD BY AUTOMATED COUNT: 19.5 % (ref 11.5–14.5)
GFR SERPL CREATININE-BSD FRML MDRD: 32 ML/MIN
GLUCOSE SERPL-MCNC: 102 MG/DL (ref 70–104)
HCT VFR BLD AUTO: 28.3 % (ref 42–52)
HGB BLD-MCNC: 7.9 G/DL (ref 14–17.9)
HYPOCHROMIA BLD QL SMEAR: (no result)
LYMPHOCYTES # BLD AUTO: 76.4 X10'3 (ref 1.1–4.8)
LYMPHOCYTES NFR BLD AUTO: 92.5 % (ref 21–51)
LYMPHOCYTES NFR BLD MANUAL: 97 % (ref 21–51)
MAGNESIUM SERPL-MCNC: 2.1 MG/DL (ref 1.5–2.4)
MCH RBC QN AUTO: 25.1 PG (ref 27–31)
MCHC RBC AUTO-ENTMCNC: 27.8 G/DL (ref 33–36.5)
MCV RBC AUTO: 90.3 FL (ref 78–98)
MONOCYTES # BLD AUTO: 0.9 X10'3 (ref 0–0.9)
MONOCYTES NFR BLD AUTO: 1 % (ref 2–12)
NEUTROPHILS # BLD AUTO: 5.1 X10'3 (ref 1.8–7.7)
NEUTROPHILS NFR BLD AUTO: 6.2 % (ref 42–75)
NEUTS SEG NFR BLD MANUAL: 3 % (ref 42–75)
PLATELET # BLD AUTO: 102 X10'3 (ref 140–440)
PLATELET BLD QL SMEAR: (no result)
PMV BLD AUTO: 8.4 FL (ref 7.4–10.4)
POLYCHROMASIA BLD QL SMEAR: (no result)
POTASSIUM SERPL-SCNC: 3.6 MMOL/L (ref 3.5–5.1)
RBC # BLD AUTO: 3.14 X10'6 (ref 4.7–6.1)
RBC MORPH BLD: (no result)
SMUDGE CELLS BLD QL SMEAR: (no result)
SODIUM SERPL-SCNC: 148 MMOL/L (ref 135–145)
STOMATOCYTES BLD QL SMEAR: (no result)
TOTAL CELLS COUNTED FLD: 100
WBC # BLD AUTO: 82.7 X10'3 (ref 4.5–11)

## 2019-09-21 PROCEDURE — 5A09357 ASSISTANCE WITH RESPIRATORY VENTILATION, LESS THAN 24 CONSECUTIVE HOURS, CONTINUOUS POSITIVE AIRWAY PRESSURE: ICD-10-PCS | Performed by: FAMILY MEDICINE

## 2019-09-21 RX ADMIN — PANTOPRAZOLE SODIUM SCH MG: 40 TABLET, DELAYED RELEASE ORAL at 20:24

## 2019-09-21 RX ADMIN — CEFTRIAXONE SCH MLS/HR: 1 INJECTION, SOLUTION INTRAVENOUS at 08:10

## 2019-09-21 RX ADMIN — HYDROCODONE BITARTRATE AND ACETAMINOPHEN PRN TAB: 10; 325 TABLET ORAL at 20:26

## 2019-09-21 RX ADMIN — DOBUTAMINE IN DEXTROSE SCH MLS/HR: 200 INJECTION, SOLUTION INTRAVENOUS at 08:11

## 2019-09-21 RX ADMIN — PANTOPRAZOLE SODIUM SCH MG: 40 TABLET, DELAYED RELEASE ORAL at 08:12

## 2019-09-21 RX ADMIN — FUROSEMIDE SCH MLS/HR: 10 INJECTION, SOLUTION INTRAMUSCULAR; INTRAVENOUS at 07:24

## 2019-09-21 RX ADMIN — HYDROCODONE BITARTRATE AND ACETAMINOPHEN SCH TAB: 10; 325 TABLET ORAL at 01:40

## 2019-09-21 RX ADMIN — IPRATROPIUM BROMIDE AND ALBUTEROL SULFATE SCH ML: .5; 3 SOLUTION RESPIRATORY (INHALATION) at 19:20

## 2019-09-21 RX ADMIN — BUDESONIDE SCH MG: 0.5 INHALANT RESPIRATORY (INHALATION) at 06:51

## 2019-09-21 RX ADMIN — HYDROCODONE BITARTRATE AND ACETAMINOPHEN SCH TAB: 10; 325 TABLET ORAL at 08:16

## 2019-09-21 RX ADMIN — HYDROCODONE BITARTRATE AND ACETAMINOPHEN SCH TAB: 10; 325 TABLET ORAL at 14:00

## 2019-09-21 RX ADMIN — DOCUSATE SODIUM SCH MG: 100 CAPSULE, LIQUID FILLED ORAL at 20:24

## 2019-09-21 RX ADMIN — Medication SCH MMU: at 20:24

## 2019-09-21 RX ADMIN — IPRATROPIUM BROMIDE AND ALBUTEROL SULFATE SCH ML: .5; 3 SOLUTION RESPIRATORY (INHALATION) at 15:02

## 2019-09-21 RX ADMIN — IPRATROPIUM BROMIDE AND ALBUTEROL SULFATE SCH ML: .5; 3 SOLUTION RESPIRATORY (INHALATION) at 11:14

## 2019-09-21 RX ADMIN — POTASSIUM CHLORIDE PRN MEQ: 1500 TABLET, EXTENDED RELEASE ORAL at 08:14

## 2019-09-21 RX ADMIN — BUDESONIDE SCH MG: 0.5 INHALANT RESPIRATORY (INHALATION) at 19:20

## 2019-09-21 RX ADMIN — IPRATROPIUM BROMIDE AND ALBUTEROL SULFATE SCH ML: .5; 3 SOLUTION RESPIRATORY (INHALATION) at 06:51

## 2019-09-21 RX ADMIN — FUROSEMIDE SCH MLS/HR: 10 INJECTION, SOLUTION INTRAMUSCULAR; INTRAVENOUS at 19:31

## 2019-09-21 RX ADMIN — FLUTICASONE PROPIONATE SCH SPR: 50 SPRAY, METERED NASAL at 08:12

## 2019-09-21 RX ADMIN — Medication SCH MMU: at 08:12

## 2019-09-21 RX ADMIN — POTASSIUM CHLORIDE PRN MEQ: 1500 TABLET, EXTENDED RELEASE ORAL at 15:35

## 2019-09-21 NOTE — NUR
Patient in room PCU 3010. I have received report from ADRIANNA SWIFT and had the opportunity to 
ask questions and assume patient care.

## 2019-09-21 NOTE — NUR
PAGER ID: [phone]

MESSAGE: [phone], BRANDON Alex Pt. is obtunded. No response to name or sternal rub. He has 
scheduled Norco 10 q6H. Can we try narcan? Ko [facility] [phone]

## 2019-09-21 NOTE — NUR
Patient in room PCU 3010. I have received report from Nadja SWIFT  and had the opportunity to 
ask questions and assume patient care.

## 2019-09-21 NOTE — NUR
Notified MD of patient LOC s/p Narcan administration.



PAGER ID:  [phone] 

MESSAGE:  [id number] BRANDON Alex Pt alert, responsive, cooperative s/p Narcan administration. Satting 
high 80s to low 90s on high flow. LORNA Martin [phone]

## 2019-09-21 NOTE — NUR
Problems reprioritized. Patient report given, questions answered & plan of care reviewed 
with Ko SWIFT.

## 2019-09-22 VITALS — SYSTOLIC BLOOD PRESSURE: 112 MMHG | DIASTOLIC BLOOD PRESSURE: 52 MMHG

## 2019-09-22 VITALS — DIASTOLIC BLOOD PRESSURE: 58 MMHG | SYSTOLIC BLOOD PRESSURE: 112 MMHG

## 2019-09-22 VITALS — SYSTOLIC BLOOD PRESSURE: 115 MMHG | DIASTOLIC BLOOD PRESSURE: 68 MMHG

## 2019-09-22 VITALS — DIASTOLIC BLOOD PRESSURE: 59 MMHG | SYSTOLIC BLOOD PRESSURE: 113 MMHG

## 2019-09-22 VITALS — SYSTOLIC BLOOD PRESSURE: 107 MMHG | DIASTOLIC BLOOD PRESSURE: 62 MMHG

## 2019-09-22 VITALS — SYSTOLIC BLOOD PRESSURE: 117 MMHG | DIASTOLIC BLOOD PRESSURE: 62 MMHG

## 2019-09-22 VITALS — DIASTOLIC BLOOD PRESSURE: 69 MMHG | SYSTOLIC BLOOD PRESSURE: 120 MMHG

## 2019-09-22 VITALS — SYSTOLIC BLOOD PRESSURE: 117 MMHG | DIASTOLIC BLOOD PRESSURE: 59 MMHG

## 2019-09-22 VITALS — DIASTOLIC BLOOD PRESSURE: 71 MMHG | SYSTOLIC BLOOD PRESSURE: 117 MMHG

## 2019-09-22 VITALS — SYSTOLIC BLOOD PRESSURE: 128 MMHG | DIASTOLIC BLOOD PRESSURE: 57 MMHG

## 2019-09-22 VITALS — DIASTOLIC BLOOD PRESSURE: 51 MMHG | SYSTOLIC BLOOD PRESSURE: 115 MMHG

## 2019-09-22 VITALS — DIASTOLIC BLOOD PRESSURE: 62 MMHG | SYSTOLIC BLOOD PRESSURE: 124 MMHG

## 2019-09-22 VITALS — SYSTOLIC BLOOD PRESSURE: 120 MMHG | DIASTOLIC BLOOD PRESSURE: 63 MMHG

## 2019-09-22 LAB
ALBUMIN SERPL BCP-MCNC: 3 G/DL (ref 3.4–5)
ANION GAP SERPL CALCULATED.3IONS-SCNC: 0 MMOL/L (ref 8–16)
ANISOCYTOSIS BLD QL SMEAR: (no result)
BASOPHILS # BLD AUTO: 0 X10'3 (ref 0–0.2)
BASOPHILS NFR BLD AUTO: 0 % (ref 0–1)
BUN SERPL-MCNC: 47 MG/DL (ref 7–18)
BUN/CREAT SERPL: 27 (ref 5.4–32)
CALCIUM SERPL-MCNC: 9.1 MG/DL (ref 8.5–10.1)
CHLORIDE SERPL-SCNC: 98 MMOL/L (ref 99–107)
CO2 SERPL-SCNC: 47.3 MMOL/L (ref 24–32)
CREAT SERPL-MCNC: 1.74 MG/DL (ref 0.6–1.1)
EOSINOPHIL # BLD AUTO: 0.2 X10'3 (ref 0–0.9)
EOSINOPHIL NFR BLD AUTO: 0.2 % (ref 0–6)
ERYTHROCYTE [DISTWIDTH] IN BLOOD BY AUTOMATED COUNT: 20 % (ref 11.5–14.5)
GFR SERPL CREATININE-BSD FRML MDRD: 39 ML/MIN
GLUCOSE SERPL-MCNC: 95 MG/DL (ref 70–104)
HCT VFR BLD AUTO: 29 % (ref 42–52)
HGB BLD-MCNC: 8.1 G/DL (ref 14–17.9)
HYPOCHROMIA BLD QL SMEAR: (no result)
LYMPHOCYTES # BLD AUTO: 85.1 X10'3 (ref 1.1–4.8)
LYMPHOCYTES NFR BLD AUTO: 86.1 % (ref 21–51)
LYMPHOCYTES NFR BLD MANUAL: 86 % (ref 21–51)
MCH RBC QN AUTO: 25.5 PG (ref 27–31)
MCHC RBC AUTO-ENTMCNC: 28 G/DL (ref 33–36.5)
MCV RBC AUTO: 90.8 FL (ref 78–98)
MONOCYTES # BLD AUTO: 2.6 X10'3 (ref 0–0.9)
MONOCYTES NFR BLD AUTO: 2.6 % (ref 2–12)
NEUTROPHILS # BLD AUTO: 11 X10'3 (ref 1.8–7.7)
NEUTROPHILS NFR BLD AUTO: 11.1 % (ref 42–75)
NEUTS SEG NFR BLD MANUAL: 14 % (ref 42–75)
PLATELET # BLD AUTO: 99 X10'3 (ref 140–440)
PLATELET BLD QL SMEAR: (no result)
PMV BLD AUTO: 8.3 FL (ref 7.4–10.4)
POLYCHROMASIA BLD QL SMEAR: (no result)
POTASSIUM SERPL-SCNC: 3.5 MMOL/L (ref 3.5–5.1)
RBC # BLD AUTO: 3.19 X10'6 (ref 4.7–6.1)
RBC MORPH BLD: (no result)
SMUDGE CELLS BLD QL SMEAR: (no result)
SODIUM SERPL-SCNC: 145 MMOL/L (ref 135–145)
STOMATOCYTES BLD QL SMEAR: (no result)
TOTAL CELLS COUNTED FLD: 100
WBC # BLD AUTO: 98.9 X10'3 (ref 4.5–11)

## 2019-09-22 PROCEDURE — 5A09357 ASSISTANCE WITH RESPIRATORY VENTILATION, LESS THAN 24 CONSECUTIVE HOURS, CONTINUOUS POSITIVE AIRWAY PRESSURE: ICD-10-PCS | Performed by: FAMILY MEDICINE

## 2019-09-22 RX ADMIN — POTASSIUM CHLORIDE PRN MEQ: 1500 TABLET, EXTENDED RELEASE ORAL at 14:48

## 2019-09-22 RX ADMIN — PANTOPRAZOLE SODIUM SCH MG: 40 TABLET, DELAYED RELEASE ORAL at 21:35

## 2019-09-22 RX ADMIN — PANTOPRAZOLE SODIUM SCH MG: 40 TABLET, DELAYED RELEASE ORAL at 08:09

## 2019-09-22 RX ADMIN — HYDROCODONE BITARTRATE AND ACETAMINOPHEN PRN TAB: 10; 325 TABLET ORAL at 10:54

## 2019-09-22 RX ADMIN — FUROSEMIDE SCH MLS/HR: 10 INJECTION, SOLUTION INTRAMUSCULAR; INTRAVENOUS at 05:24

## 2019-09-22 RX ADMIN — FUROSEMIDE SCH MLS/HR: 10 INJECTION, SOLUTION INTRAMUSCULAR; INTRAVENOUS at 16:53

## 2019-09-22 RX ADMIN — IPRATROPIUM BROMIDE AND ALBUTEROL SULFATE SCH ML: .5; 3 SOLUTION RESPIRATORY (INHALATION) at 19:42

## 2019-09-22 RX ADMIN — DOCUSATE SODIUM SCH MG: 100 CAPSULE, LIQUID FILLED ORAL at 21:36

## 2019-09-22 RX ADMIN — FLUTICASONE PROPIONATE SCH SPR: 50 SPRAY, METERED NASAL at 08:08

## 2019-09-22 RX ADMIN — BUDESONIDE SCH MG: 0.5 INHALANT RESPIRATORY (INHALATION) at 06:53

## 2019-09-22 RX ADMIN — IPRATROPIUM BROMIDE AND ALBUTEROL SULFATE SCH ML: .5; 3 SOLUTION RESPIRATORY (INHALATION) at 06:53

## 2019-09-22 RX ADMIN — IPRATROPIUM BROMIDE AND ALBUTEROL SULFATE SCH ML: .5; 3 SOLUTION RESPIRATORY (INHALATION) at 11:14

## 2019-09-22 RX ADMIN — IPRATROPIUM BROMIDE AND ALBUTEROL SULFATE SCH ML: .5; 3 SOLUTION RESPIRATORY (INHALATION) at 14:55

## 2019-09-22 RX ADMIN — DOBUTAMINE IN DEXTROSE SCH MLS/HR: 200 INJECTION, SOLUTION INTRAVENOUS at 16:51

## 2019-09-22 RX ADMIN — Medication SCH MMU: at 21:36

## 2019-09-22 RX ADMIN — Medication SCH MMU: at 08:09

## 2019-09-22 RX ADMIN — BUDESONIDE SCH MG: 0.5 INHALANT RESPIRATORY (INHALATION) at 19:41

## 2019-09-22 RX ADMIN — CEFTRIAXONE SCH MLS/HR: 1 INJECTION, SOLUTION INTRAVENOUS at 08:08

## 2019-09-22 RX ADMIN — HYDROCODONE BITARTRATE AND ACETAMINOPHEN PRN TAB: 10; 325 TABLET ORAL at 01:58

## 2019-09-22 RX ADMIN — POTASSIUM CHLORIDE PRN MEQ: 1500 TABLET, EXTENDED RELEASE ORAL at 21:36

## 2019-09-22 RX ADMIN — POTASSIUM CHLORIDE PRN MEQ: 1500 TABLET, EXTENDED RELEASE ORAL at 08:09

## 2019-09-22 RX ADMIN — DOBUTAMINE IN DEXTROSE SCH MLS/HR: 200 INJECTION, SOLUTION INTRAVENOUS at 00:01

## 2019-09-22 NOTE — NUR
Problems reprioritized. Patient report given, questions answered & plan of care reviewed 
with Evangelina SWIFT.

## 2019-09-22 NOTE — NUR
Patient in room PCU 3010. I have received report from JENIFFER Burr and had the opportunity to 
ask questions and assume patient care.

## 2019-09-22 NOTE — NUR
Patient in room PCU 3010. I have received report from Jenniffer SWIFT and had the opportunity to 
ask questions and assume patient care.

## 2019-09-22 NOTE — NUR
Problems reprioritized. Patient report given, questions answered & plan of care reviewed 
with Aminah SWIFT.

## 2019-09-23 VITALS — SYSTOLIC BLOOD PRESSURE: 104 MMHG | DIASTOLIC BLOOD PRESSURE: 57 MMHG

## 2019-09-23 VITALS — SYSTOLIC BLOOD PRESSURE: 119 MMHG | DIASTOLIC BLOOD PRESSURE: 73 MMHG

## 2019-09-23 VITALS — DIASTOLIC BLOOD PRESSURE: 49 MMHG | SYSTOLIC BLOOD PRESSURE: 94 MMHG

## 2019-09-23 VITALS — SYSTOLIC BLOOD PRESSURE: 103 MMHG | DIASTOLIC BLOOD PRESSURE: 56 MMHG

## 2019-09-23 VITALS — DIASTOLIC BLOOD PRESSURE: 69 MMHG | SYSTOLIC BLOOD PRESSURE: 112 MMHG

## 2019-09-23 LAB
ALBUMIN SERPL BCP-MCNC: 3.1 G/DL (ref 3.4–5)
ANION GAP SERPL CALCULATED.3IONS-SCNC: 2 MMOL/L (ref 8–16)
ANISOCYTOSIS BLD QL SMEAR: (no result)
BASOPHILS # BLD AUTO: 0.5 X10'3 (ref 0–0.2)
BASOPHILS NFR BLD AUTO: 0.5 % (ref 0–1)
BUN SERPL-MCNC: 38 MG/DL (ref 7–18)
BUN/CREAT SERPL: 21.5 (ref 5.4–32)
CALCIUM SERPL-MCNC: 8.8 MG/DL (ref 8.5–10.1)
CHLORIDE SERPL-SCNC: 100 MMOL/L (ref 99–107)
CO2 SERPL-SCNC: 42.9 MMOL/L (ref 24–32)
CREAT SERPL-MCNC: 1.77 MG/DL (ref 0.6–1.1)
EOSINOPHIL # BLD AUTO: 0.1 X10'3 (ref 0–0.9)
EOSINOPHIL NFR BLD AUTO: 0.1 % (ref 0–6)
ERYTHROCYTE [DISTWIDTH] IN BLOOD BY AUTOMATED COUNT: 20.4 % (ref 11.5–14.5)
GFR SERPL CREATININE-BSD FRML MDRD: 38 ML/MIN
GLUCOSE SERPL-MCNC: 103 MG/DL (ref 70–104)
HCT VFR BLD AUTO: 28.6 % (ref 42–52)
HGB BLD-MCNC: 8.1 G/DL (ref 14–17.9)
HYPOCHROMIA BLD QL SMEAR: (no result)
LYMPHOCYTES # BLD AUTO: 79.7 X10'3 (ref 1.1–4.8)
LYMPHOCYTES NFR BLD AUTO: 83.4 % (ref 21–51)
LYMPHOCYTES NFR BLD MANUAL: 89 % (ref 21–51)
MCH RBC QN AUTO: 25.5 PG (ref 27–31)
MCHC RBC AUTO-ENTMCNC: 28.2 G/DL (ref 33–36.5)
MCV RBC AUTO: 90.4 FL (ref 78–98)
MONOCYTES # BLD AUTO: 1.4 X10'3 (ref 0–0.9)
MONOCYTES NFR BLD AUTO: 1.4 % (ref 2–12)
NEUTROPHILS # BLD AUTO: 14 X10'3 (ref 1.8–7.7)
NEUTROPHILS NFR BLD AUTO: 14.6 % (ref 42–75)
NEUTS SEG NFR BLD MANUAL: 11 % (ref 42–75)
PLATELET # BLD AUTO: 93 X10'3 (ref 140–440)
PLATELET BLD QL SMEAR: (no result)
PMV BLD AUTO: 8.1 FL (ref 7.4–10.4)
POLYCHROMASIA BLD QL SMEAR: (no result)
POTASSIUM SERPL-SCNC: 3.6 MMOL/L (ref 3.5–5.1)
RBC # BLD AUTO: 3.16 X10'6 (ref 4.7–6.1)
RBC MORPH BLD: (no result)
SMUDGE CELLS BLD QL SMEAR: (no result)
SODIUM SERPL-SCNC: 145 MMOL/L (ref 135–145)
STOMATOCYTES BLD QL SMEAR: (no result)
TOTAL CELLS COUNTED FLD: 100
WBC # BLD AUTO: 95.6 X10'3 (ref 4.5–11)

## 2019-09-23 RX ADMIN — MORPHINE SULFATE PRN MG: 10 INJECTION, SOLUTION INTRAMUSCULAR; INTRAVENOUS at 14:19

## 2019-09-23 RX ADMIN — MORPHINE SULFATE PRN MG: 10 INJECTION, SOLUTION INTRAMUSCULAR; INTRAVENOUS at 20:06

## 2019-09-23 RX ADMIN — FLUTICASONE PROPIONATE SCH SPR: 50 SPRAY, METERED NASAL at 07:51

## 2019-09-23 RX ADMIN — IPRATROPIUM BROMIDE AND ALBUTEROL SULFATE SCH ML: .5; 3 SOLUTION RESPIRATORY (INHALATION) at 10:52

## 2019-09-23 RX ADMIN — MORPHINE SULFATE PRN MG: 10 INJECTION, SOLUTION INTRAMUSCULAR; INTRAVENOUS at 12:32

## 2019-09-23 RX ADMIN — DOBUTAMINE IN DEXTROSE SCH MLS/HR: 200 INJECTION, SOLUTION INTRAVENOUS at 08:19

## 2019-09-23 RX ADMIN — FUROSEMIDE SCH MLS/HR: 10 INJECTION, SOLUTION INTRAMUSCULAR; INTRAVENOUS at 03:05

## 2019-09-23 RX ADMIN — DOCUSATE SODIUM SCH MG: 100 CAPSULE, LIQUID FILLED ORAL at 22:37

## 2019-09-23 RX ADMIN — PANTOPRAZOLE SODIUM SCH MG: 40 TABLET, DELAYED RELEASE ORAL at 07:51

## 2019-09-23 RX ADMIN — BUDESONIDE SCH MG: 0.5 INHALANT RESPIRATORY (INHALATION) at 06:52

## 2019-09-23 RX ADMIN — MORPHINE SULFATE PRN MG: 10 INJECTION, SOLUTION INTRAMUSCULAR; INTRAVENOUS at 22:37

## 2019-09-23 RX ADMIN — Medication SCH MMU: at 07:49

## 2019-09-23 RX ADMIN — CEFTRIAXONE SCH MLS/HR: 1 INJECTION, SOLUTION INTRAVENOUS at 07:52

## 2019-09-23 RX ADMIN — IPRATROPIUM BROMIDE AND ALBUTEROL SULFATE SCH ML: .5; 3 SOLUTION RESPIRATORY (INHALATION) at 06:53

## 2019-09-23 RX ADMIN — POTASSIUM CHLORIDE PRN MEQ: 1500 TABLET, EXTENDED RELEASE ORAL at 07:51

## 2019-09-23 NOTE — NUR
Problems reprioritized. Patient report given, questions answered & plan of care reviewed 
with Evangelina SWIFT. Patient stable at time of transfer.

## 2019-09-23 NOTE — NUR
Paged Dr. Spencer:



PAGER ID:  [phone] 

MESSAGE:  RE: Fermin Alex [phone]. Patient's wife is bedside to speak with you. Thank you. 
Lydia [phone]

## 2019-09-23 NOTE — NUR
Patient in room PCU 3010. I have received report from Evangelina SWIFT  and had the opportunity to 
ask questions and assume patient   care.  Patient asleep in bed.  Dobutamine gtt at 5 
mcg/kg/min and lasix gtt @ 10 mL/hour.  All immediate needs met at this time.  Sitter 
present bedside.

## 2019-09-23 NOTE — NUR
Problems reprioritized. Patient report given, questions answered & plan of care reviewed 
with Evangelina SWIFT. Patient stable at transfer of care.

## 2019-09-23 NOTE — NUR
Patient in room PCU 3010. I have received report from Evangelina Mei and had the opportunity to 
ask questions and assume patient care. Patient stable at transfer of care.

## 2019-09-23 NOTE — NUR
New orders from Dr. Spencer:



Discontinue lasix gtt.  Brice catheter (A), Lasix 40 mg IV BID start at 2000.

## 2019-09-23 NOTE — NUR
Patient in room PCU 3010. I have received report from Lydia SWIFT/Alice don,   and had the 
opportunity to ask questions and assume patient care.

## 2019-09-23 NOTE — NUR
Problems reprioritized. Patient report given, questions answered & plan of care reviewed 
with Lydia SWIFT.

## 2019-09-24 RX ADMIN — MORPHINE SULFATE PRN MG: 10 INJECTION, SOLUTION INTRAMUSCULAR; INTRAVENOUS at 02:45

## 2019-09-24 RX ADMIN — DOCUSATE SODIUM SCH MG: 100 CAPSULE, LIQUID FILLED ORAL at 08:00

## 2019-09-24 RX ADMIN — MORPHINE SULFATE PRN MG: 10 INJECTION, SOLUTION INTRAMUSCULAR; INTRAVENOUS at 01:23

## 2019-09-24 NOTE — NUR
Noted that patient's code status has been made DNR with comfort care. Diet 

has been changed to regular. Will continue to follow per LOS protocol.

Rec:

1. Continue bowel care per comfort care measures

-------------------------------------------------------------------------------

Addendum: 09/24/19 at 0918 by Kelley Oakley RD

-------------------------------------------------------------------------------

Amended: Links added.

## 2019-09-24 NOTE — NUR
Discussed with Kay SWIFT and Alice RN about contacting the mortuary.  The wife is 
collecting the belongings and Kay and Alice will finish the care.

## 2019-09-24 NOTE — NUR
Patient  at 0547 this am. Wife had been at bedside and signed expiration memorandum 
and has chosen [facility] for mortuary services. and has now left. Called [facility] to  
the patient. Removed PIV and jackson catheter. Post-mortem care has been provided. Awaiting 
[facility] for .

## 2019-09-24 NOTE — NUR
Patient in room PCU 3010. I have received report from Evangelina SWIFT. Patient  at 0547. I 
will clean patient up and call [facility] mortuary after wife has time with the patient.

## 2019-09-24 NOTE — NUR
Nursing Progress Note:

At 0547 patient went into asystole and .  Dr. Graves was called and is aware.  His 
wife was notified and is in the room now. 



Legal hold:



Client on voluntary/involuntary status for GD/DTS/DTO.



Report received from nurse with use of SBAR.



Why are they here:.

Pneumonia, Respiratory Failure



Assessment



What has happened this shift:  Patient  at 0547

S/I, H/I:

A/VH: 

Sleep:

ADL's:

Group attendance:Evangelina Guerrero meds taken:No

Any med S/E No





Mental Status Exam







Interventions



PRN's used: morphine 5 and 10mg

Therapeutic interventions:

Restraints/seclusion/emergency medication:





Justification of Continued Inpatient Treatment:

## 2023-11-28 NOTE — NUR
Unit secretary notified to order bed per orders.

-------------------------------------------------------------------------------

Addendum: 09/18/19 at 1603 by Destiny Padilla RN

-------------------------------------------------------------------------------

Amended: Links added. Unit RN to OR RN